# Patient Record
Sex: MALE | Race: OTHER | Employment: FULL TIME | URBAN - METROPOLITAN AREA
[De-identification: names, ages, dates, MRNs, and addresses within clinical notes are randomized per-mention and may not be internally consistent; named-entity substitution may affect disease eponyms.]

---

## 2019-07-27 ENCOUNTER — ANESTHESIA EVENT (OUTPATIENT)
Dept: ENDOSCOPY | Facility: HOSPITAL | Age: 59
End: 2019-07-27

## 2019-07-27 ENCOUNTER — ANESTHESIA (OUTPATIENT)
Dept: ENDOSCOPY | Facility: HOSPITAL | Age: 59
End: 2019-07-27

## 2019-07-27 ENCOUNTER — HOSPITAL ENCOUNTER (OUTPATIENT)
Facility: HOSPITAL | Age: 59
Setting detail: OBSERVATION
Discharge: HOME | End: 2019-07-29
Attending: EMERGENCY MEDICINE | Admitting: HOSPITALIST

## 2019-07-27 DIAGNOSIS — K92.2 GASTROINTESTINAL HEMORRHAGE, UNSPECIFIED GASTROINTESTINAL HEMORRHAGE TYPE: Primary | ICD-10-CM

## 2019-07-27 DIAGNOSIS — D64.9 ANEMIA, UNSPECIFIED TYPE: ICD-10-CM

## 2019-07-27 DIAGNOSIS — N28.9 RENAL INSUFFICIENCY: ICD-10-CM

## 2019-07-27 LAB
ALBUMIN SERPL-MCNC: 3.4 G/DL (ref 3.4–5)
ALBUMIN/GLOB SERPL: 1.2 {RATIO} (ref 1–2)
ALP LIVER SERPL-CCNC: 44 U/L (ref 45–117)
ALT SERPL-CCNC: 24 U/L (ref 16–61)
ANION GAP SERPL CALC-SCNC: 11 MMOL/L (ref 0–18)
ANTIBODY SCREEN: NEGATIVE
AST SERPL-CCNC: 12 U/L (ref 15–37)
BASOPHILS # BLD AUTO: 0.07 X10(3) UL (ref 0–0.2)
BASOPHILS NFR BLD AUTO: 0.6 %
BILIRUB SERPL-MCNC: 0.3 MG/DL (ref 0.1–2)
BUN BLD-MCNC: 112 MG/DL (ref 7–18)
BUN/CREAT SERPL: 48.9 (ref 10–20)
CALCIUM BLD-MCNC: 8.5 MG/DL (ref 8.5–10.1)
CHLORIDE SERPL-SCNC: 101 MMOL/L (ref 98–112)
CO2 SERPL-SCNC: 24 MMOL/L (ref 21–32)
CREAT BLD-MCNC: 2.29 MG/DL (ref 0.7–1.3)
DEPRECATED RDW RBC AUTO: 43.8 FL (ref 35.1–46.3)
EOSINOPHIL # BLD AUTO: 0.07 X10(3) UL (ref 0–0.7)
EOSINOPHIL NFR BLD AUTO: 0.6 %
ERYTHROCYTE [DISTWIDTH] IN BLOOD BY AUTOMATED COUNT: 13.5 % (ref 11–15)
GLOBULIN PLAS-MCNC: 2.9 G/DL (ref 2.8–4.4)
GLUCOSE BLD-MCNC: 113 MG/DL (ref 70–99)
GLUCOSE BLD-MCNC: 154 MG/DL (ref 70–99)
HCT VFR BLD AUTO: 27.8 % (ref 39–53)
HGB BLD-MCNC: 8 G/DL (ref 13–17.5)
HGB BLD-MCNC: 9.5 G/DL (ref 13–17.5)
IMM GRANULOCYTES # BLD AUTO: 0.14 X10(3) UL (ref 0–1)
IMM GRANULOCYTES NFR BLD: 1.1 %
LYMPHOCYTES # BLD AUTO: 2.38 X10(3) UL (ref 1–4)
LYMPHOCYTES NFR BLD AUTO: 18.7 %
M PROTEIN MFR SERPL ELPH: 6.3 G/DL (ref 6.4–8.2)
MCH RBC QN AUTO: 30.4 PG (ref 26–34)
MCHC RBC AUTO-ENTMCNC: 34.2 G/DL (ref 31–37)
MCV RBC AUTO: 89.1 FL (ref 80–100)
MONOCYTES # BLD AUTO: 0.8 X10(3) UL (ref 0.1–1)
MONOCYTES NFR BLD AUTO: 6.3 %
NEUTROPHILS # BLD AUTO: 9.24 X10 (3) UL (ref 1.5–7.7)
NEUTROPHILS # BLD AUTO: 9.24 X10(3) UL (ref 1.5–7.7)
NEUTROPHILS NFR BLD AUTO: 72.7 %
OSMOLALITY SERPL CALC.SUM OF ELEC: 321 MOSM/KG (ref 275–295)
PLATELET # BLD AUTO: 178 10(3)UL (ref 150–450)
POTASSIUM SERPL-SCNC: 3.3 MMOL/L (ref 3.5–5.1)
RBC # BLD AUTO: 3.12 X10(6)UL (ref 4.3–5.7)
RH BLOOD TYPE: POSITIVE
SODIUM SERPL-SCNC: 136 MMOL/L (ref 136–145)
WBC # BLD AUTO: 12.7 X10(3) UL (ref 4–11)

## 2019-07-27 PROCEDURE — 0DJ08ZZ INSPECTION OF UPPER INTESTINAL TRACT, VIA NATURAL OR ARTIFICIAL OPENING ENDOSCOPIC: ICD-10-PCS | Performed by: STUDENT IN AN ORGANIZED HEALTH CARE EDUCATION/TRAINING PROGRAM

## 2019-07-27 PROCEDURE — 30233N1 TRANSFUSION OF NONAUTOLOGOUS RED BLOOD CELLS INTO PERIPHERAL VEIN, PERCUTANEOUS APPROACH: ICD-10-PCS | Performed by: STUDENT IN AN ORGANIZED HEALTH CARE EDUCATION/TRAINING PROGRAM

## 2019-07-27 PROCEDURE — 99219 INITIAL OBSERVATION CARE,LEVL II: CPT | Performed by: STUDENT IN AN ORGANIZED HEALTH CARE EDUCATION/TRAINING PROGRAM

## 2019-07-27 RX ORDER — SUCRALFATE ORAL 1 G/10ML
1 SUSPENSION ORAL EVERY 6 HOURS
Status: DISCONTINUED | OUTPATIENT
Start: 2019-07-27 | End: 2019-07-29

## 2019-07-27 RX ORDER — LOSARTAN POTASSIUM AND HYDROCHLOROTHIAZIDE 25; 100 MG/1; MG/1
1 TABLET ORAL DAILY
COMMUNITY
End: 2019-12-10

## 2019-07-27 RX ORDER — SODIUM CHLORIDE, SODIUM LACTATE, POTASSIUM CHLORIDE, CALCIUM CHLORIDE 600; 310; 30; 20 MG/100ML; MG/100ML; MG/100ML; MG/100ML
INJECTION, SOLUTION INTRAVENOUS CONTINUOUS
Status: DISCONTINUED | OUTPATIENT
Start: 2019-07-27 | End: 2019-07-27

## 2019-07-27 RX ORDER — ATORVASTATIN CALCIUM 40 MG/1
40 TABLET, FILM COATED ORAL NIGHTLY
COMMUNITY
End: 2020-01-13

## 2019-07-27 RX ORDER — DEXTROSE MONOHYDRATE 25 G/50ML
50 INJECTION, SOLUTION INTRAVENOUS
Status: DISCONTINUED | OUTPATIENT
Start: 2019-07-27 | End: 2019-07-29

## 2019-07-27 RX ORDER — METOCLOPRAMIDE HYDROCHLORIDE 5 MG/ML
10 INJECTION INTRAMUSCULAR; INTRAVENOUS EVERY 8 HOURS PRN
Status: DISCONTINUED | OUTPATIENT
Start: 2019-07-27 | End: 2019-07-29

## 2019-07-27 RX ORDER — ONDANSETRON 2 MG/ML
4 INJECTION INTRAMUSCULAR; INTRAVENOUS EVERY 6 HOURS PRN
Status: DISCONTINUED | OUTPATIENT
Start: 2019-07-27 | End: 2019-07-29

## 2019-07-27 RX ORDER — SODIUM CHLORIDE 9 MG/ML
INJECTION, SOLUTION INTRAVENOUS ONCE
Status: COMPLETED | OUTPATIENT
Start: 2019-07-27 | End: 2019-07-27

## 2019-07-27 RX ORDER — HYDROMORPHONE HYDROCHLORIDE 1 MG/ML
0.4 INJECTION, SOLUTION INTRAMUSCULAR; INTRAVENOUS; SUBCUTANEOUS EVERY 5 MIN PRN
Status: DISCONTINUED | OUTPATIENT
Start: 2019-07-27 | End: 2019-07-27 | Stop reason: HOSPADM

## 2019-07-27 RX ORDER — AMLODIPINE BESYLATE 10 MG/1
10 TABLET ORAL DAILY
COMMUNITY
End: 2019-12-10 | Stop reason: ALTCHOICE

## 2019-07-27 RX ORDER — SODIUM CHLORIDE 9 MG/ML
INJECTION, SOLUTION INTRAVENOUS CONTINUOUS
Status: DISCONTINUED | OUTPATIENT
Start: 2019-07-27 | End: 2019-07-29

## 2019-07-27 RX ORDER — NALOXONE HYDROCHLORIDE 0.4 MG/ML
80 INJECTION, SOLUTION INTRAMUSCULAR; INTRAVENOUS; SUBCUTANEOUS AS NEEDED
Status: DISCONTINUED | OUTPATIENT
Start: 2019-07-27 | End: 2019-07-27 | Stop reason: HOSPADM

## 2019-07-27 RX ORDER — DEXTROSE MONOHYDRATE 25 G/50ML
50 INJECTION, SOLUTION INTRAVENOUS
Status: DISCONTINUED | OUTPATIENT
Start: 2019-07-27 | End: 2019-07-27 | Stop reason: HOSPADM

## 2019-07-27 RX ORDER — TERBINAFINE HYDROCHLORIDE 250 MG/1
250 TABLET ORAL DAILY
COMMUNITY
End: 2019-12-10 | Stop reason: ALTCHOICE

## 2019-07-27 NOTE — CONSULTS
[facility]  Report of GI Consultation    John Ortega Patient Status:  Emergency    7/15/1960 MRN [id number]   Location [address] [facility] Attending Morena Ace MD   [facility] Day # 0 PCP PHYSICIAN NONSTAFF     Date of Admission:  / rate 18, height 67\", weight 205 lb, SpO2 98 %. GENERAL: NAD, oriented x 3, pleasant  HENT: Normocephalic, no temporal wasting. Normal oral mucosa with good dentition, no ulcers. EYES: No scleral icterus, no conjunctival pallor.   PULM: Lungs clear to

## 2019-07-27 NOTE — ANESTHESIA PREPROCEDURE EVALUATION
PRE-OP EVALUATION    Patient Name: Antoine Amor    Pre-op Diagnosis: Melena    Procedure(s):  ESOPHAGOGASTRODUODENOSCOPY    Surgeon(s) and Role:     * Isela Pichardo MD - Primary    Pre-op vitals reviewed.   Pulse: 87  Resp: 18  BP: 100/64  SpO2: 98 % (L) 07/27/2019     07/27/2019    CO2 24.0 07/27/2019     (H) 07/27/2019    CREATSERUM 2.29 (H) 07/27/2019     (H) 07/27/2019    CA 8.5 07/27/2019            Airway      Mallampati: III  Mouth opening: 3 FB  TM distance: > 6 cm  Neck ROM

## 2019-07-27 NOTE — ED INITIAL ASSESSMENT (HPI)
A/o x3, pt reports rectal bleeding since yesterday morning, described large dark brown blood yesterday, today less amount but feels weakness and lethargic with difficulty in breathing. Hx of bleeding ulcers three years ago.

## 2019-07-27 NOTE — ANESTHESIA POSTPROCEDURE EVALUATION
[address] [facility] Patient Status:  Emergency   Age/Gender 61year old male MRN [id number]   Location [facility] Attending No att. providers found   Hosp Day # 0 PCP PHYSICIAN NONSTAFF       Anesthesia Post-op Note    Procedure(s)

## 2019-07-27 NOTE — OPERATIVE REPORT
EGD operative report  Patient Name: Thony Buckner  Procedure: Esophagogastroduodenoscopy  Indication: melena  Attending: Delia Ramos M.D. Consent:  The risks, benefits, and alternatives were discussed with the patient / POA.   Risks included, but were not the duodenum. Recommendations:   1. H pylori stool Ag  2. NPO with sips of clears, ice chips for now; will re-assess in PM and possibly clears later today  3. Transfuse 1 unit given hypotension and symptoms  4. PPI q12 hours  5. Sucralfate QID  6.  Repeat

## 2019-07-27 NOTE — PROGRESS NOTES
Daughter to bring home meds, pt does not recall the names of what he takes. Pt stated he does take something for sugars, he believes it is metformin. Dr. Toy Rowan made aware, per MD low dose column insulin started q6 accuchecks.

## 2019-07-27 NOTE — ED PROVIDER NOTES
Patient Seen in: [facility] Emergency Department    History   Patient presents with:  Blood In Stool    Stated Complaint:     HPI    70-year-old male presents emergency department who states has rectal bleeding since yesterday morning.   States start Supple no JVD no lymphadenopathy no meningismus no carotid bruit  CV: Regular rate and rhythm no murmur rub  Respiratory: Clear to auscultation bilaterally no crackles no wheezes no accessory muscle use  Abdomen: Soft nontender nondistended, no rebound no -----------         ------                     82 Nikki Ramírez (BLOOD BPSB)[[id number]]                               Final result               ANTIBODY HUQGDX[[id number]]                                  Final result                 Please view results for these t

## 2019-07-27 NOTE — PLAN OF CARE
Pt Aox4. R.A. 2L 02 w/sleep. Per pt at previous hospital stay they recommended a sleep study, tele applied per OSR protocol. Pt received 1 unit PRBC, tolerated well without reaction. K+rider ordered per electrolyte protocol following blood admin.   0.9% @

## 2019-07-27 NOTE — PROGRESS NOTES
NURSING ADMISSION NOTE      Patient admitted via cart from Endo Lab following procedure  Oriented to room. Safety precautions initiated. Bed in low position. Call light in reach. IVF infusing, pt denies pain.   Pt made aware of need for stool samp

## 2019-07-27 NOTE — H&P
[facility]  History and Physical     Darlin Kaba Patient Status:  Emergency    7/15/1960 MRN [id number]   Location [address] [facility] Attending Otto Mendosa MD   Hosp Day # 0 PCP PHYSICIAN NONSTAFF     Chief Complaint: Hina Sykes re grossly intact. Musculoskeletal: Moves all extremities. Extremities: No edema or cyanosis. Integument: No rashes or lesions. Psychiatric: Appropriate mood and affect.       Diagnostic Data:      Labs:  Recent Labs   Lab 07/27/19  0728   WBC 12.7*   HGB

## 2019-07-28 LAB
ANION GAP SERPL CALC-SCNC: 6 MMOL/L (ref 0–18)
BASOPHILS # BLD AUTO: 0.06 X10(3) UL (ref 0–0.2)
BASOPHILS NFR BLD AUTO: 0.9 %
BLOOD TYPE BARCODE: 5100
BUN BLD-MCNC: 32 MG/DL (ref 7–18)
BUN/CREAT SERPL: 38.6 (ref 10–20)
CALCIUM BLD-MCNC: 7.8 MG/DL (ref 8.5–10.1)
CHLORIDE SERPL-SCNC: 112 MMOL/L (ref 98–112)
CO2 SERPL-SCNC: 25 MMOL/L (ref 21–32)
CREAT BLD-MCNC: 0.83 MG/DL (ref 0.7–1.3)
DEPRECATED RDW RBC AUTO: 46.5 FL (ref 35.1–46.3)
EOSINOPHIL # BLD AUTO: 0.15 X10(3) UL (ref 0–0.7)
EOSINOPHIL NFR BLD AUTO: 2.2 %
ERYTHROCYTE [DISTWIDTH] IN BLOOD BY AUTOMATED COUNT: 14.1 % (ref 11–15)
GLUCOSE BLD-MCNC: 103 MG/DL (ref 70–99)
GLUCOSE BLD-MCNC: 109 MG/DL (ref 70–99)
GLUCOSE BLD-MCNC: 112 MG/DL (ref 70–99)
GLUCOSE BLD-MCNC: 115 MG/DL (ref 70–99)
GLUCOSE BLD-MCNC: 128 MG/DL (ref 70–99)
GLUCOSE BLD-MCNC: 148 MG/DL (ref 70–99)
HCT VFR BLD AUTO: 22 % (ref 39–53)
HGB BLD-MCNC: 7.3 G/DL (ref 13–17.5)
IMM GRANULOCYTES # BLD AUTO: 0.04 X10(3) UL (ref 0–1)
IMM GRANULOCYTES NFR BLD: 0.6 %
LYMPHOCYTES # BLD AUTO: 2.24 X10(3) UL (ref 1–4)
LYMPHOCYTES NFR BLD AUTO: 32.1 %
MCH RBC QN AUTO: 30 PG (ref 26–34)
MCHC RBC AUTO-ENTMCNC: 33.2 G/DL (ref 31–37)
MCV RBC AUTO: 90.5 FL (ref 80–100)
MONOCYTES # BLD AUTO: 0.46 X10(3) UL (ref 0.1–1)
MONOCYTES NFR BLD AUTO: 6.6 %
NEUTROPHILS # BLD AUTO: 4.02 X10 (3) UL (ref 1.5–7.7)
NEUTROPHILS # BLD AUTO: 4.02 X10(3) UL (ref 1.5–7.7)
NEUTROPHILS NFR BLD AUTO: 57.6 %
OSMOLALITY SERPL CALC.SUM OF ELEC: 303 MOSM/KG (ref 275–295)
PLATELET # BLD AUTO: 119 10(3)UL (ref 150–450)
POTASSIUM SERPL-SCNC: 3.8 MMOL/L (ref 3.5–5.1)
RBC # BLD AUTO: 2.43 X10(6)UL (ref 4.3–5.7)
SODIUM SERPL-SCNC: 143 MMOL/L (ref 136–145)
WBC # BLD AUTO: 7 X10(3) UL (ref 4–11)

## 2019-07-28 PROCEDURE — 99226 SUBSEQUENT OBSERVATION CARE: CPT | Performed by: STUDENT IN AN ORGANIZED HEALTH CARE EDUCATION/TRAINING PROGRAM

## 2019-07-28 NOTE — PROGRESS NOTES
Hgb continues to decrease. 7.3 this morning. Dr. Merri Opitz paged. D/c IVF, advance diet to full liquids. No transfusion at this time.

## 2019-07-28 NOTE — PLAN OF CARE
Pt Aox4. R.A.  0.9% IVF d/c'd, pt tolerating full liquid diet. Pt had one black formed stool this morning. Hgb 7.3, both MD's aware, IV iron given. K+ 3.8, per hospitalist does not need replaced. Pt denies pain to abdomen, voiding.   Pt up to chair and w

## 2019-07-28 NOTE — PROGRESS NOTES
[facility]  Progress Note     Casandra Clarity Patient Status:  Observation    7/15/1960 MRN [id number]   [facility] [place] 3SW-A Attending Humberto Iyer MD   Hosp Day # 0 PCP PHYSICIAN NONSTAFF     Chief Complaint: GIB    S: Patient feeling / PLAN:     1. Anemia due to GIB  2. HypoK  3. IRVING on CKD   4. HTN- hold meds, pt hypotensive  5.  DM type 2- ISS    Plan of care:   ADAT  Monitor Hb, tx for hb < 7  GI on Cs      Quality:  · DVT Prophylaxis: SCD  · CODE status: full  · [place]: no  · Central

## 2019-07-28 NOTE — PLAN OF CARE
Plan of care explained and updated with patient input. Progressing per plan of care. Patient remains alert and oriented to person, place, time and situation.  On room air with continuous pulse oximetry monitoring and telemetry monitoring, NSR. Shelley sin

## 2019-07-29 VITALS
WEIGHT: 205 LBS | BODY MASS INDEX: 32.18 KG/M2 | OXYGEN SATURATION: 98 % | TEMPERATURE: 99 F | HEIGHT: 67 IN | DIASTOLIC BLOOD PRESSURE: 79 MMHG | SYSTOLIC BLOOD PRESSURE: 136 MMHG | RESPIRATION RATE: 17 BRPM | HEART RATE: 68 BPM

## 2019-07-29 LAB
GLUCOSE BLD-MCNC: 109 MG/DL (ref 70–99)
GLUCOSE BLD-MCNC: 123 MG/DL (ref 70–99)
GLUCOSE BLD-MCNC: 127 MG/DL (ref 70–99)
HGB BLD-MCNC: 7.9 G/DL (ref 13–17.5)

## 2019-07-29 PROCEDURE — 99217 OBSERVATION CARE DISCHARGE: CPT | Performed by: STUDENT IN AN ORGANIZED HEALTH CARE EDUCATION/TRAINING PROGRAM

## 2019-07-29 RX ORDER — PANTOPRAZOLE SODIUM 40 MG/1
40 TABLET, DELAYED RELEASE ORAL
Qty: 120 TABLET | Refills: 0 | Status: SHIPPED | OUTPATIENT
Start: 2019-07-29 | End: 2019-12-10 | Stop reason: ALTCHOICE

## 2019-07-29 RX ORDER — SUCRALFATE ORAL 1 G/10ML
1 SUSPENSION ORAL EVERY 6 HOURS
Qty: 560 ML | Refills: 0 | Status: SHIPPED | OUTPATIENT
Start: 2019-07-29 | End: 2019-12-10 | Stop reason: ALTCHOICE

## 2019-07-29 NOTE — DISCHARGE SUMMARY
[facility]  DISCHARGE SUMMARY     Gwynneth Comp Patient Status:  Observation    7/15/1960 MRN [id number]   [facility] 3SW-A Attending No att. providers found   [facility] Day # 0 PCP PHYSICIAN NONSTAFF     Date of Admission: 2019  Date Refills:  0     atorvastatin 40 MG Tabs  Commonly known as:  LIPITOR      Take 40 mg by mouth nightly. Refills:  0     Losartan Potassium-HCTZ 100-25 MG Tabs  Commonly known as:  HYZAAR      Take 1 tablet by mouth daily.    Refills:  0     metFORMIN HCl 5 edema.  -----------------------------------------------------------------------------------------------  PATIENT DISCHARGE INSTRUCTIONS: See electronic chart    Alfa Sabillon MD    Time spent:  <30 minutes

## 2019-07-29 NOTE — PROGRESS NOTES
Gastroenterology Progress Note  Shaneka Patrick Patient Status:  Observation    7/15/1960 MRN [id number]   [facility] 3SW-A Attending Laurel Wilhelm MD   Hosp Day # 0 PCP PHYSICIAN NONSTAFF     Ch Pylori and hx of ulcers)  4. Will plan for an outpt follow-up with Dr Edgardo De Souza in 3-4 weeks; office contacted and will call pt.  Will need to determine need for possible repeat EGD at f/u office visit   RAMYA Murphy  11:03 AM  7/29/2019  Lora Six

## 2019-07-29 NOTE — PROGRESS NOTES
[address] [facility]  Report of GI Consultation    Evangelist Mace Patient Status:  Observation    7/15/1960 MRN [id number]   [facility] 3SW-A Attending Samanta Ghosh MD   Hosp Day # 0 PCP PHYSICIAN NONSTAFF     Date of Admission:  2019  PCP: 07/28/2019    .0 (L) 07/28/2019    CREATSERUM 0.83 07/28/2019    BUN 32 (H) 07/28/2019     07/28/2019    K 3.8 07/28/2019    CO2 25.0 07/28/2019    CA 7.8 (L) 07/28/2019    ALB 3.4 07/27/2019    ALKPHO 44 (L) 07/27/2019    TP 6.3 (L) 07/27/201

## 2019-07-29 NOTE — PLAN OF CARE
Plan of care explained and updated with patient input. Progressing per plan of care. Patient is alert and oriented to person, place, time and situation. On room air with continuous pulse oximetry monitoring and telemetry monitoring, with NSR.  Shelley sin

## 2019-07-29 NOTE — PLAN OF CARE
Problem: Patient/Family Goals  Goal: Patient/Family Long Term Goal  Description  Patient's Long Term Goal: \"take better care of myself, start eating healthy\"    Interventions:  - follow-up appts, participate in plan of care, follow recommended plan of

## 2019-07-30 LAB — H PYLORI AG STL QL IA: POSITIVE

## 2019-08-04 NOTE — PROGRESS NOTES
Cassidy Giles,  Please relay following message to patient. The antibiotics should be cheap, as they are all generic, so I think he should be able to get these even without having any insurance.   He can be seen once in follow-up no charge to review the long-term

## 2019-08-05 NOTE — PROGRESS NOTES
In addition to message I previously sent you, also notify patient that he has to hold his cholesterol medication (atorvastatin) while he takes his antibiotics.   Thanks,  PM

## 2019-12-11 PROBLEM — Z12.5 SCREENING FOR PROSTATE CANCER: Status: ACTIVE | Noted: 2019-12-11

## 2019-12-11 PROBLEM — Z87.19 HISTORY OF GI BLEED: Status: ACTIVE | Noted: 2019-12-11

## 2019-12-11 PROBLEM — E66.9 TYPE 2 DIABETES MELLITUS WITH OBESITY (HCC): Status: ACTIVE | Noted: 2019-12-11

## 2019-12-11 PROBLEM — E11.69 TYPE 2 DIABETES MELLITUS WITH OBESITY (HCC): Status: ACTIVE | Noted: 2019-12-11

## 2019-12-11 PROBLEM — E78.2 MIXED HYPERLIPIDEMIA: Status: ACTIVE | Noted: 2019-12-11

## 2019-12-11 PROBLEM — I10 ACCELERATED HYPERTENSION: Status: ACTIVE | Noted: 2019-12-11

## 2024-12-15 ENCOUNTER — HOSPITAL ENCOUNTER (OUTPATIENT)
Facility: HOSPITAL | Age: 64
Setting detail: OBSERVATION
Discharge: HOME | End: 2024-12-17
Attending: EMERGENCY MEDICINE | Admitting: INTERNAL MEDICINE

## 2024-12-15 DIAGNOSIS — K92.2 UPPER GI BLEED: Primary | ICD-10-CM

## 2024-12-15 DIAGNOSIS — D64.9 ANEMIA, UNSPECIFIED TYPE: ICD-10-CM

## 2024-12-15 LAB
ALBUMIN SERPL-MCNC: 4 G/DL (ref 3.2–4.8)
ALBUMIN/GLOB SERPL: 1.5 {RATIO} (ref 1–2)
ALP LIVER SERPL-CCNC: 64 U/L
ALT SERPL-CCNC: 29 U/L
ANION GAP SERPL CALC-SCNC: 5 MMOL/L (ref 0–18)
ANTIBODY SCREEN: NEGATIVE
APTT PPP: 35 SECONDS (ref 23–36)
AST SERPL-CCNC: 29 U/L (ref ?–34)
BASOPHILS # BLD AUTO: 0.09 X10(3) UL (ref 0–0.2)
BASOPHILS NFR BLD AUTO: 1.3 %
BILIRUB SERPL-MCNC: 0.4 MG/DL (ref 0.2–1.1)
BUN BLD-MCNC: 15 MG/DL (ref 9–23)
CALCIUM BLD-MCNC: 9.6 MG/DL (ref 8.7–10.4)
CHLORIDE SERPL-SCNC: 108 MMOL/L (ref 98–112)
CO2 SERPL-SCNC: 28 MMOL/L (ref 21–32)
CREAT BLD-MCNC: 0.79 MG/DL
DEPRECATED HBV CORE AB SER IA-ACNC: 19 NG/ML
EGFRCR SERPLBLD CKD-EPI 2021: 99 ML/MIN/1.73M2 (ref 60–?)
EOSINOPHIL # BLD AUTO: 0.28 X10(3) UL (ref 0–0.7)
EOSINOPHIL NFR BLD AUTO: 3.9 %
ERYTHROCYTE [DISTWIDTH] IN BLOOD BY AUTOMATED COUNT: 19.5 %
GLOBULIN PLAS-MCNC: 2.6 G/DL (ref 2–3.5)
GLUCOSE BLD-MCNC: 120 MG/DL (ref 70–99)
HCT VFR BLD AUTO: 38.4 %
HGB BLD-MCNC: 11.8 G/DL
IMM GRANULOCYTES # BLD AUTO: 0.03 X10(3) UL (ref 0–1)
IMM GRANULOCYTES NFR BLD: 0.4 %
INR BLD: 1.25 (ref 0.8–1.2)
IRON SATN MFR SERPL: 10 %
IRON SERPL-MCNC: 43 UG/DL
LYMPHOCYTES # BLD AUTO: 2.2 X10(3) UL (ref 1–4)
LYMPHOCYTES NFR BLD AUTO: 30.7 %
MCH RBC QN AUTO: 24 PG (ref 26–34)
MCHC RBC AUTO-ENTMCNC: 30.7 G/DL (ref 31–37)
MCV RBC AUTO: 78 FL
MONOCYTES # BLD AUTO: 0.77 X10(3) UL (ref 0.1–1)
MONOCYTES NFR BLD AUTO: 10.7 %
NEUTROPHILS # BLD AUTO: 3.8 X10 (3) UL (ref 1.5–7.7)
NEUTROPHILS # BLD AUTO: 3.8 X10(3) UL (ref 1.5–7.7)
NEUTROPHILS NFR BLD AUTO: 53 %
OSMOLALITY SERPL CALC.SUM OF ELEC: 294 MOSM/KG (ref 275–295)
PLATELET # BLD AUTO: 214 10(3)UL (ref 150–450)
PLATELETS.RETICULATED NFR BLD AUTO: 6.3 % (ref 0–7)
POTASSIUM SERPL-SCNC: 3.7 MMOL/L (ref 3.5–5.1)
PROT SERPL-MCNC: 6.6 G/DL (ref 5.7–8.2)
PROTHROMBIN TIME: 15.9 SECONDS (ref 11.6–14.8)
RBC # BLD AUTO: 4.92 X10(6)UL
RH BLOOD TYPE: POSITIVE
SODIUM SERPL-SCNC: 141 MMOL/L (ref 136–145)
TOTAL IRON BINDING CAPACITY: 411 UG/DL (ref 250–425)
TRANSFERRIN SERPL-MCNC: 324 MG/DL (ref 215–365)
WBC # BLD AUTO: 7.2 X10(3) UL (ref 4–11)

## 2024-12-15 PROCEDURE — 99223 1ST HOSP IP/OBS HIGH 75: CPT | Performed by: INTERNAL MEDICINE

## 2024-12-15 RX ORDER — SENNOSIDES 8.6 MG
17.2 TABLET ORAL NIGHTLY PRN
Status: DISCONTINUED | OUTPATIENT
Start: 2024-12-15 | End: 2024-12-17

## 2024-12-15 RX ORDER — ACETAMINOPHEN 500 MG
500 TABLET ORAL EVERY 4 HOURS PRN
Status: DISCONTINUED | OUTPATIENT
Start: 2024-12-15 | End: 2024-12-17

## 2024-12-15 RX ORDER — BISACODYL 10 MG
10 SUPPOSITORY, RECTAL RECTAL
Status: DISCONTINUED | OUTPATIENT
Start: 2024-12-15 | End: 2024-12-17

## 2024-12-15 RX ORDER — ONDANSETRON 2 MG/ML
4 INJECTION INTRAMUSCULAR; INTRAVENOUS EVERY 6 HOURS PRN
Status: DISCONTINUED | OUTPATIENT
Start: 2024-12-15 | End: 2024-12-17

## 2024-12-15 RX ORDER — POLYETHYLENE GLYCOL 3350 17 G/17G
17 POWDER, FOR SOLUTION ORAL DAILY PRN
Status: DISCONTINUED | OUTPATIENT
Start: 2024-12-15 | End: 2024-12-17

## 2024-12-15 RX ORDER — BENZONATATE 200 MG/1
200 CAPSULE ORAL 3 TIMES DAILY PRN
Status: DISCONTINUED | OUTPATIENT
Start: 2024-12-15 | End: 2024-12-17

## 2024-12-15 RX ORDER — ECHINACEA PURPUREA EXTRACT 125 MG
1 TABLET ORAL
Status: DISCONTINUED | OUTPATIENT
Start: 2024-12-15 | End: 2024-12-17

## 2024-12-15 RX ORDER — SODIUM CHLORIDE 9 MG/ML
INJECTION, SOLUTION INTRAVENOUS CONTINUOUS
Status: ACTIVE | OUTPATIENT
Start: 2024-12-15 | End: 2024-12-16

## 2024-12-15 RX ORDER — DICYCLOMINE HYDROCHLORIDE 10 MG/1
10 CAPSULE ORAL 3 TIMES DAILY PRN
Status: DISCONTINUED | OUTPATIENT
Start: 2024-12-15 | End: 2024-12-17

## 2024-12-15 RX ORDER — MORPHINE SULFATE 2 MG/ML
1 INJECTION, SOLUTION INTRAMUSCULAR; INTRAVENOUS EVERY 4 HOURS PRN
Status: DISCONTINUED | OUTPATIENT
Start: 2024-12-15 | End: 2024-12-17

## 2024-12-15 RX ORDER — ATORVASTATIN CALCIUM 40 MG/1
40 TABLET, FILM COATED ORAL NIGHTLY
Status: DISCONTINUED | OUTPATIENT
Start: 2024-12-15 | End: 2024-12-17

## 2024-12-15 RX ORDER — PROCHLORPERAZINE EDISYLATE 5 MG/ML
5 INJECTION INTRAMUSCULAR; INTRAVENOUS EVERY 8 HOURS PRN
Status: DISCONTINUED | OUTPATIENT
Start: 2024-12-15 | End: 2024-12-17

## 2024-12-15 RX ORDER — SODIUM CHLORIDE 9 MG/ML
INJECTION, SOLUTION INTRAVENOUS CONTINUOUS
Status: DISCONTINUED | OUTPATIENT
Start: 2024-12-15 | End: 2024-12-15

## 2024-12-15 RX ORDER — LOSARTAN POTASSIUM 100 MG/1
100 TABLET ORAL DAILY
Status: DISCONTINUED | OUTPATIENT
Start: 2024-12-16 | End: 2024-12-17

## 2024-12-15 RX ORDER — ROSUVASTATIN CALCIUM 40 MG/1
40 TABLET, COATED ORAL NIGHTLY
COMMUNITY
End: 2024-12-17

## 2024-12-15 RX ORDER — SODIUM PHOSPHATE, DIBASIC AND SODIUM PHOSPHATE, MONOBASIC 7; 19 G/230ML; G/230ML
1 ENEMA RECTAL ONCE AS NEEDED
Status: DISCONTINUED | OUTPATIENT
Start: 2024-12-15 | End: 2024-12-17

## 2024-12-15 RX ORDER — PNV NO.95/FERROUS FUM/FOLIC AC 28MG-0.8MG
TABLET ORAL
COMMUNITY

## 2024-12-15 NOTE — ED QUICK NOTES
Rounding Completed     Plan of Care reviewed.  Elimination needs assessed.  Comfort measures met.     Bed is locked and in lowest position. Call light within reach.    Rounding note  Hospitalist at bedside with patient.

## 2024-12-15 NOTE — ED QUICK NOTES
Orders for admission, patient is aware of plan and ready to go upstairs. Any questions, please call ED RN eric at extension [phone].     Patient Covid vaccination status: Fully vaccinated     COVID Test Ordered in ED: None    COVID Suspicion at Admission: N/A    Running Infusions:      Mental Status/LOC at time of transport: axo4    Other pertinent information: Pt on RA, ambulatory  CIWA score: N/A   NIH score:  N/A

## 2024-12-15 NOTE — ED PROVIDER NOTES
Patient Seen in: [facility] Emergency Department      History     Chief Complaint   Patient presents with    GI Bleeding    Abdomen/Flank Pain     Stated Complaint: GI bleed    Subjective:   HPI      Patient is a 64-year-old male who reports he has a prior history of upper GI bleed from bleeding ulcers presenting with 3 dark stools in the last 3 days.  He has a little bit of epigastric pain as well.  No nausea or vomiting.  Has not felt lightheaded, fatigued or weak.  He states in February of this year he was hospitalized at [facility] which is when they found the ulcer.  He states his hemoglobin was quite low at that time and he needed 3 or 4 units of blood.    Objective:     Past Medical History:    Anemia    Diabetes (HCC)    Essential hypertension    GI bleed    History of blood transfusion    Hyperlipidemia              History reviewed. No pertinent surgical history.             Social History     Socioeconomic History    Marital status: Single   Tobacco Use    Smoking status: Former     Current packs/day: 0.50     Average packs/day: 0.5 packs/day for 30.0 years (15.0 ttl pk-yrs)     Types: Cigarettes    Smokeless tobacco: Never    Tobacco comments:     6-7 cigarettes a day   Vaping Use    Vaping status: Every Day   Substance and Sexual Activity    Alcohol use: Not Currently     Social Drivers of Health     Financial Resource Strain: Not on File (7/20/2023)    Received from "[employer]    Financial Resource Strain     Financial Resource Strain: 0   Food Insecurity: Not on File (8/17/2023)    Received from "[employer]    Food Insecurity     Food: 0   Transportation Needs: Not on File (8/17/2023)    Received from "[employer]    Transportation Needs     Transportation: 0   Physical Activity: Not on File (7/20/2023)    Received from "[employer]    Physical Activity     Physical Activity: 0   Stress: Not on File (7/20/2023)    Received from "[employer]    Stress     Stress: 0   Social Connections: Not on File (9/10/2024)     Received from [employer]    Social Connections     Connectedness: 0   Housing Stability: Not on File (2023)    Received from [employer]    Housing Stability     Housin                  Physical Exam     ED Triage Vitals [12/15/24 1237]   /69   Pulse 83   Resp 18   Temp 98 °F (36.7 °C)   Temp src Oral   SpO2 96 %   O2 Device None (Room air)       Current Vitals:   Vital Signs  BP: 128/66  Pulse: 72  Resp: 18  Temp: 98 °F (36.7 °C)  Temp src: Oral  MAP (mmHg): 84    Oxygen Therapy  SpO2: 95 %  O2 Device: None (Room air)        Physical Exam  Vitals and nursing note reviewed.   Constitutional:       Appearance: He is well-developed.   HENT:      Head: Normocephalic and atraumatic.   Eyes:      Conjunctiva/sclera: Conjunctivae normal.      Pupils: Pupils are equal, round, and reactive to light.   Cardiovascular:      Rate and Rhythm: Normal rate and regular rhythm.      Heart sounds: Normal heart sounds.   Pulmonary:      Effort: Pulmonary effort is normal.      Breath sounds: Normal breath sounds.   Abdominal:      General: Bowel sounds are normal.      Palpations: Abdomen is soft.      Comments: Minimal epigastric tenderness.  Nondistended.  No rebound or guarding.   Genitourinary:     Comments: Patient declined rectal exam for stool guaiac.  Musculoskeletal:         General: Normal range of motion.      Cervical back: Normal range of motion and neck supple.   Skin:     General: Skin is warm and dry.   Neurological:      Mental Status: He is alert and oriented to person, place, and time.             ED Course     Labs Reviewed   COMP METABOLIC PANEL (14) - Abnormal; Notable for the following components:       Result Value    Glucose 120 (*)     All other components within normal limits   CBC WITH DIFFERENTIAL WITH PLATELET - Abnormal; Notable for the following components:    HGB 11.8 (*)     HCT 38.4 (*)     MCV 78.0 (*)     MCH 24.0 (*)     MCHC 30.7 (*)     All other components within normal limits    PROTHROMBIN TIME (PT) - Abnormal; Notable for the following components:    PT 15.9 (*)     INR 1.25 (*)     All other components within normal limits   PTT, ACTIVATED - Normal   TYPE AND SCREEN    Narrative:     The following orders were created for panel order Type and screen.  Procedure                               Abnormality         Status                     ---------                               -----------         ------                     ABORH (Blood Type)[[id number]]                               Final result               Antibody Screen[[id number]]                                  Final result                 Please view results for these tests on the individual orders.   ABORH (BLOOD TYPE)   ANTIBODY SCREEN   RAINBOW DRAW GOLD                   [facility]      64-year-old male who reports prior history of upper GI bleed from an ulcer earlier this year requiring transfusion of multiple units of packed red cells presenting with recurrent symptoms of 3 black stools in the last 3 days.  He is a little bit of epigastric pain.  He is not uncomfortable here.  He has not felt fatigued or lightheaded at all but he states this is exactly how his symptoms started in February and he wanted to come earlier so he did not get as sick.  He also reports he was hospitalized in September with similar dark stool but states it was from polyps in his colon.  Labs ordered to evaluate for anemia, thrombocytopenia, coagulopathy.    Admission disposition: 12/15/2024  3:26 PM       Update at 3:25 PM.  Hemoglobin of 11.8 is a little bit low.  No real recent ones for comparison.  This may be acute although BUN is normal which would argue against that.  I think with his history of upper GI bleed from an ulcer and the mild anemia he does warrant admission for serial hemoglobins and GI evaluation.        Past Medical History-hypertension, diabetes    Differential diagnosis before testing included ulcer, gastritis    Co-morbidities that  add to the complexity of management include: None    Testing ordered during this visit included labs      Discussion of management with hospitalist, GI      Medications Provided: Protonix          Disposition:    Admission  I have discussed with the patient the results of test, differential diagnosis, and treatment plan. They expressed clear understanding of these instructions and agrees to the plan provided.         Medical Decision Making      Disposition and Plan     Clinical Impression:  1. Upper GI bleed    2. Anemia, unspecified type         Disposition:  Admit  12/15/2024  3:26 pm    Follow-up:  No follow-up provider specified.        Medications Prescribed:  Current Discharge Medication List              Supplementary Documentation:         Hospital Problems       Present on Admission  Date Reviewed: 9/8/2020            ICD-10-CM Noted POA    * (Principal) Upper GI bleed K92.2 12/15/2024 Unknown

## 2024-12-15 NOTE — H&P
[facility]     Moises Olsen Patient Status:  Emergency    7/15/1960 MRN [id number]   Location [facility] EMERGENCY DEPARTMENT Attending Brown Wilkes MD   Hosp Day # 0 PCP Jackson Landrum MD     Chief Complaint: Dark stool    Subjective:    History of Present Illness:   Moises Olsen is a 64 year old male with PMHx peptic ulcer disease with prior GI bleed requiring blood transfusion, prediabetes, hypertension and hyperlipidemia who presents to the hospital for evaluation of dark stool.  Symptoms been ongoing since Friday morning and he has had 3 episodes of dark stool along with epigastric discomfort but not actual pain.  He denies any fever, chills, nausea, vomiting, diarrhea, chest pain, shortness of breath or dizziness.  Patient was hospitalized in 2024 at [facility] when they found the ulcer and he required blood transfusion at that time.  He was again admitted with dark stool in September.  In the ER, he is afebrile and hemodynamically stable CMP unremarkable and INR is 1.25.  Hemoglobin is 11.8 with MCV 78.    History/Other:    Past Medical History:  Past Medical History:    Anemia    Diabetes (HCC)    Essential hypertension    GI bleed    History of blood transfusion    Hyperlipidemia     Past Surgical History:   History reviewed. No pertinent surgical history.   Family History:   No family history on file.  Social History:    reports that he has quit smoking. His smoking use included cigarettes. He has a 15 pack-year smoking history. He has never used smokeless tobacco. He reports that he does not currently use alcohol.     Allergies: Allergies[1]    Medications:  Medications Ordered Prior to Encounter[2]    Review of Systems:   A comprehensive review of systems was completed.    Pertinent positives and negatives noted in the HPI.    Objective:   Physical Exam:    /66   Pulse 72   Temp 98 °F (36.7 °C) (Oral)   Resp 18   Ht 5' 7\" (1.702 m)    Wt 210 lb (95.3 kg)   SpO2 95%   BMI 32.89 kg/m²   General: No acute distress, awake and alert  Respiratory: No rhonchi, no wheezes  Cardiovascular: S1, S2. Regular rate and rhythm  Abdomen: Soft, Non-tender, non-distended, positive bowel sounds  Neuro: MONIQUE x 4  Extremities: No edema    Results:    Labs:      Labs Last 24 Hours:  Recent Labs   Lab 12/15/24  1344   RBC 4.92   HGB 11.8*   HCT 38.4*   MCV 78.0*   MCH 24.0*   MCHC 30.7*   RDW 19.5   NEPRELIM 3.80   WBC 7.2   .0     Recent Labs   Lab 12/15/24  1344   *   BUN 15   CREATSERUM 0.79   EGFRCR 99   CA 9.6   ALB 4.0      K 3.7      CO2 28.0   ALKPHO 64   AST 29   ALT 29   BILT 0.4   TP 6.6     Lab Results   Component Value Date    INR 1.25 (H) 12/15/2024     No results for input(s): \"TROP\", \"TROPHS\", \"CK\" in the last 168 hours.    No results for input(s): \"TROP\", \"PBNP\" in the last 168 hours.    No results for input(s): \"PCT\" in the last 168 hours.    Imaging: Imaging data reviewed in Epic.    Assessment & Plan:      #Dark stool suspect due to upper GI bleed  #Hx of GI bleed due to ulcer earlier this year  -GI consult  -IV PPI BID  -CLD, NPO at midnight. Start IVF at midnight  -Monitor hgb, no baseline from this year    #Microcytosis  -Check iron studies    #Prediabetes  -Most recent A1c is 6.0    #Hypertension  -Resume home losartan but hold hydrochlorothiazide     #Hyperlipidemia  -Statin      Plan of care discussed with patient and daughter.    Fly Bingham DO    Supplementary Documentation:     The 21st Century Cures Act makes medical notes like these available to patients in the interest of transparency. Please be advised this is a medical document. Medical documents are intended to carry relevant information, facts as evident, and the clinical opinion of the practitioner. The medical note is intended as peer to peer communication and may appear blunt or direct. It is written in medical language and may contain abbreviations or  verbiage that are unfamiliar.                   [1] No Known Allergies  [2]   No current facility-administered medications on file prior to encounter.     Current Outpatient Medications on File Prior to Encounter   Medication Sig Dispense Refill    rosuvastatin 40 MG Oral Tab Take 1 tablet (40 mg total) by mouth nightly.      Ferrous Sulfate (IRON) 325 (65 Fe) MG Oral Tab Take by mouth.      Losartan Potassium-HCTZ 100-25 MG Oral Tab Take 1 tablet by mouth daily. 90 tablet 1    predniSONE 10 MG Oral Tab 4 tabs daily with food 2 days then 3 tabs for 2 days, then 2 tabs for 2 days then 1 tab for 2 days. 20 tablet 0    atorvastatin 40 MG Oral Tab Take 1 tablet (40 mg total) by mouth nightly. 90 tablet 1    ergocalciferol 1.25 MG (16031 UT) Oral Cap Take 1 capsule (50,000 Units total) by mouth every 7 days. (Patient not taking: Reported on 1/13/2020 ) 8 capsule 0

## 2024-12-16 ENCOUNTER — ANESTHESIA (OUTPATIENT)
Dept: ENDOSCOPY | Facility: HOSPITAL | Age: 64
End: 2024-12-16

## 2024-12-16 ENCOUNTER — ANESTHESIA EVENT (OUTPATIENT)
Dept: ENDOSCOPY | Facility: HOSPITAL | Age: 64
End: 2024-12-16

## 2024-12-16 LAB
BASOPHILS # BLD AUTO: 0.06 X10(3) UL (ref 0–0.2)
BASOPHILS NFR BLD AUTO: 1 %
EOSINOPHIL # BLD AUTO: 0.22 X10(3) UL (ref 0–0.7)
EOSINOPHIL NFR BLD AUTO: 3.6 %
ERYTHROCYTE [DISTWIDTH] IN BLOOD BY AUTOMATED COUNT: 19.3 %
HCT VFR BLD AUTO: 35.2 %
HGB BLD-MCNC: 10.6 G/DL
IMM GRANULOCYTES # BLD AUTO: 0.04 X10(3) UL (ref 0–1)
IMM GRANULOCYTES NFR BLD: 0.7 %
LYMPHOCYTES # BLD AUTO: 1.78 X10(3) UL (ref 1–4)
LYMPHOCYTES NFR BLD AUTO: 29.3 %
MCH RBC QN AUTO: 24.3 PG (ref 26–34)
MCHC RBC AUTO-ENTMCNC: 30.1 G/DL (ref 31–37)
MCV RBC AUTO: 80.7 FL
MONOCYTES # BLD AUTO: 0.72 X10(3) UL (ref 0.1–1)
MONOCYTES NFR BLD AUTO: 11.9 %
NEUTROPHILS # BLD AUTO: 3.25 X10 (3) UL (ref 1.5–7.7)
NEUTROPHILS # BLD AUTO: 3.25 X10(3) UL (ref 1.5–7.7)
NEUTROPHILS NFR BLD AUTO: 53.5 %
PLATELET # BLD AUTO: 174 10(3)UL (ref 150–450)
PLATELETS.RETICULATED NFR BLD AUTO: 7.1 % (ref 0–7)
RBC # BLD AUTO: 4.36 X10(6)UL
WBC # BLD AUTO: 6.1 X10(3) UL (ref 4–11)

## 2024-12-16 PROCEDURE — 99232 SBSQ HOSP IP/OBS MODERATE 35: CPT | Performed by: INTERNAL MEDICINE

## 2024-12-16 PROCEDURE — 0DB78ZX EXCISION OF STOMACH, PYLORUS, VIA NATURAL OR ARTIFICIAL OPENING ENDOSCOPIC, DIAGNOSTIC: ICD-10-PCS | Performed by: STUDENT IN AN ORGANIZED HEALTH CARE EDUCATION/TRAINING PROGRAM

## 2024-12-16 PROCEDURE — 0DB68ZX EXCISION OF STOMACH, VIA NATURAL OR ARTIFICIAL OPENING ENDOSCOPIC, DIAGNOSTIC: ICD-10-PCS | Performed by: STUDENT IN AN ORGANIZED HEALTH CARE EDUCATION/TRAINING PROGRAM

## 2024-12-16 RX ORDER — SUCRALFATE ORAL 1 G/10ML
1 SUSPENSION ORAL
Status: DISCONTINUED | OUTPATIENT
Start: 2024-12-16 | End: 2024-12-17

## 2024-12-16 RX ORDER — SODIUM CHLORIDE, SODIUM LACTATE, POTASSIUM CHLORIDE, CALCIUM CHLORIDE 600; 310; 30; 20 MG/100ML; MG/100ML; MG/100ML; MG/100ML
INJECTION, SOLUTION INTRAVENOUS CONTINUOUS PRN
Status: DISCONTINUED | OUTPATIENT
Start: 2024-12-16 | End: 2024-12-16 | Stop reason: SURG

## 2024-12-16 RX ORDER — LIDOCAINE HYDROCHLORIDE 10 MG/ML
INJECTION, SOLUTION EPIDURAL; INFILTRATION; INTRACAUDAL; PERINEURAL AS NEEDED
Status: DISCONTINUED | OUTPATIENT
Start: 2024-12-16 | End: 2024-12-16 | Stop reason: SURG

## 2024-12-16 RX ADMIN — SODIUM CHLORIDE, SODIUM LACTATE, POTASSIUM CHLORIDE, CALCIUM CHLORIDE: 600; 310; 30; 20 INJECTION, SOLUTION INTRAVENOUS at 10:39:00

## 2024-12-16 RX ADMIN — SODIUM CHLORIDE, SODIUM LACTATE, POTASSIUM CHLORIDE, CALCIUM CHLORIDE: 600; 310; 30; 20 INJECTION, SOLUTION INTRAVENOUS at 10:32:00

## 2024-12-16 RX ADMIN — LIDOCAINE HYDROCHLORIDE 50 MG: 10 INJECTION, SOLUTION EPIDURAL; INFILTRATION; INTRACAUDAL; PERINEURAL at 10:33:00

## 2024-12-16 NOTE — ANESTHESIA PREPROCEDURE EVALUATION
PRE-OP EVALUATION    Patient Name: Moises Olsen    Admit Diagnosis: Upper GI bleed [K92.2]  Anemia, unspecified type [D64.9]    Pre-op Diagnosis: melena / anemia    ESOPHAGOGASTRODUODENOSCOPY (EGD)    Anesthesia Procedure: ESOPHAGOGASTRODUODENOSCOPY (EGD)    Surgeons and Role:     * Laron Pichardo MD - Primary    Pre-op vitals reviewed.  Temp: 98.4 °F (36.9 °C)  Pulse: 72  Resp: 22  BP: 133/72  SpO2: 90 %  Body mass index is 32.89 kg/m².    Current medications reviewed.  Hospital Medications:   [COMPLETED] sodium chloride 0.9 % IV bolus 1,000 mL  1,000 mL Intravenous Once    [COMPLETED] pantoprazole (Protonix) 40 mg in sodium chloride 0.9% PF 10 mL IV push  40 mg Intravenous Once    pantoprazole (Protonix) 40 mg in sodium chloride 0.9% PF 10 mL IV push  40 mg Intravenous Q12H    sodium chloride 0.9% infusion   Intravenous Continuous    atorvastatin (Lipitor) tab 40 mg  40 mg Oral Nightly    losartan (Cozaar) tab 100 mg  100 mg Oral Daily    acetaminophen (Tylenol Extra Strength) tab 500 mg  500 mg Oral Q4H PRN    morphINE PF 2 MG/ML injection 1 mg  1 mg Intravenous Q4H PRN    dicyclomine (Bentyl) cap 10 mg  10 mg Oral TID PRN    melatonin tab 3 mg  3 mg Oral Nightly PRN    polyethylene glycol (PEG 3350) (Miralax) 17 g oral packet 17 g  17 g Oral Daily PRN    sennosides (Senokot) tab 17.2 mg  17.2 mg Oral Nightly PRN    bisacodyl (Dulcolax) 10 MG rectal suppository 10 mg  10 mg Rectal Daily PRN    fleet enema (Fleet) rectal enema 133 mL  1 enema Rectal Once PRN    ondansetron (Zofran) 4 MG/2ML injection 4 mg  4 mg Intravenous Q6H PRN    prochlorperazine (Compazine) 10 MG/2ML injection 5 mg  5 mg Intravenous Q8H PRN    benzonatate (Tessalon) cap 200 mg  200 mg Oral TID PRN    guaiFENesin (Robitussin) 100 MG/5 ML oral liquid 200 mg  200 mg Oral Q4H PRN    glycerin-hypromellose- (Artificial Tears) 0.2-0.2-1 % ophthalmic solution 1 drop  1 drop Both Eyes QID PRN    sodium chloride (Saline Mist) 0.65 % nasal  solution 1 spray  1 spray Each Nare Q3H PRN    sodium ferric gluconate (Ferrlecit) 125 mg in sodium chloride 0.9% 100mL IVPB premix  125 mg Intravenous Daily       Outpatient Medications:   Prescriptions Prior to Admission[1]    Allergies: Patient has no known allergies.      Anesthesia Evaluation    Patient summary reviewed.    Anesthetic Complications  (-) history of anesthetic complications         GI/Hepatic/Renal                                 Cardiovascular        Exercise tolerance: good     MET: >4      (+) hypertension                       (-) angina              Endo/Other      (+) diabetes  type 2, not using insulin                         Pulmonary               (-) shortness of breath            Neuro/Psych                                      History reviewed. No pertinent surgical history.  Social History     Socioeconomic History    Marital status: Single   Tobacco Use    Smoking status: Former     Current packs/day: 0.50     Average packs/day: 0.5 packs/day for 30.0 years (15.0 ttl pk-yrs)     Types: Cigarettes    Smokeless tobacco: Never    Tobacco comments:     6-7 cigarettes a day   Vaping Use    Vaping status: Every Day   Substance and Sexual Activity    Alcohol use: Not Currently     History   Drug Use Not on file     Available pre-op labs reviewed.  Lab Results   Component Value Date    WBC 6.1 12/16/2024    RBC 4.36 12/16/2024    HGB 10.6 (L) 12/16/2024    HCT 35.2 (L) 12/16/2024    MCV 80.7 12/16/2024    MCH 24.3 (L) 12/16/2024    MCHC 30.1 (L) 12/16/2024    RDW 19.3 12/16/2024    .0 12/16/2024     Lab Results   Component Value Date     12/15/2024    K 3.7 12/15/2024     12/15/2024    CO2 28.0 12/15/2024    BUN 15 12/15/2024    CREATSERUM 0.79 12/15/2024     (H) 12/15/2024    CA 9.6 12/15/2024     Lab Results   Component Value Date    INR 1.25 (H) 12/15/2024         Airway      Mallampati: II  Mouth opening: 3 FB  TM distance: 4 - 6 cm  Neck ROM: full  Cardiovascular      Rhythm: regular  Rate: normal     Dental             Pulmonary      Breath sounds clear to auscultation bilaterally.               Other findings              ASA: 2   Plan: MAC  NPO status verified and patient meets guidelines.    Post-procedure pain management plan discussed with surgeon and patient.    Comment: Plan is MAC anesthesia, which may include deep sedation.  Implied that memory of procedure is unlikely although intraop recall, if it occurs, may be a reasonable and comfortable experience with this anesthetic.  Aware that general anesthesia is not intended though deep sedation may include occasional moments of general anesthesia.  The backup plan for safe patient care may include change of plan to full general anesthesia with potential airway placement.  Patient (legal guardian) demonstrated understanding, accepts risks and benefits, and wishes to proceed as reflected in the signed consent form.  Difficulty airway equipment available as needed, may need general anesthesia OETT.  Previous anesthesia records reviewed.      Plan/risks discussed with: patient                Present on Admission:  **None**             [1]   Medications Prior to Admission   Medication Sig Dispense Refill Last Dose/Taking    Ferrous Sulfate (IRON) 325 (65 Fe) MG Oral Tab Take by mouth.   Past Week    Losartan Potassium-HCTZ 100-25 MG Oral Tab Take 1 tablet by mouth daily. 90 tablet 1 12/15/2024 at  7:00 AM    atorvastatin 40 MG Oral Tab Take 1 tablet (40 mg total) by mouth nightly. 90 tablet 1 12/14/2024 Evening    rosuvastatin 40 MG Oral Tab Take 1 tablet (40 mg total) by mouth nightly. (Patient not taking: Reported on 12/15/2024)   Unknown    predniSONE 10 MG Oral Tab 4 tabs daily with food 2 days then 3 tabs for 2 days, then 2 tabs for 2 days then 1 tab for 2 days. 20 tablet 0

## 2024-12-16 NOTE — PLAN OF CARE
Assumed pt care at 0730. A&O x 4. On room air. Denies pain. Vital signs stable, NSR on tele. Up with SBA.     Plan of care: EGD, IVF.    Plan of care updated with patient. Questions answered, pt verbalized understanding. Call light is within reach. All needs met at this time.    Problem: CARDIOVASCULAR - ADULT  Goal: Maintains optimal cardiac output and hemodynamic stability  Description: INTERVENTIONS:  - Monitor vital signs, rhythm, and trends  - Monitor for bleeding, hypotension and signs of decreased cardiac output  - Evaluate effectiveness of vasoactive medications to optimize hemodynamic stability  - Monitor arterial and/or venous puncture sites for bleeding and/or hematoma  - Assess quality of pulses, skin color and temperature  - Assess for signs of decreased coronary artery perfusion - ex. Angina  - Evaluate fluid balance, assess for edema, trend weights  Outcome: Progressing  Goal: Absence of cardiac arrhythmias or at baseline  Description: INTERVENTIONS:  - Continuous cardiac monitoring, monitor vital signs, obtain 12 lead EKG if indicated  - Evaluate effectiveness of antiarrhythmic and heart rate control medications as ordered  - Initiate emergency measures for life threatening arrhythmias  - Monitor electrolytes and administer replacement therapy as ordered  Outcome: Progressing     Problem: Patient/Family Goals  Goal: Patient/Family Long Term Goal  Description: Patient's Long Term Goal: prevent readmission    Interventions:  - medications as prescribed  - follow up as recommended  - See additional Care Plan goals for specific interventions  Outcome: Progressing  Goal: Patient/Family Short Term Goal  Description: Patient's Short Term Goal: discharge home    Interventions:   - tests and procedures as ordered  -monitor hgb  -watch for dark stools  -medications as prescribed  - See additional Care Plan goals for specific interventions  Outcome: Progressing     Problem: GASTROINTESTINAL - ADULT  Goal:  Minimal or absence of nausea and vomiting  Description: INTERVENTIONS:  - Maintain adequate hydration with IV or PO as ordered and tolerated  - Nasogastric tube to low intermittent suction as ordered  - Evaluate effectiveness of ordered antiemetic medications  - Provide nonpharmacologic comfort measures as appropriate  - Advance diet as tolerated, if ordered  - Obtain nutritional consult as needed  - Evaluate fluid balance  Outcome: Progressing  Goal: Maintains or returns to baseline bowel function  Description: INTERVENTIONS:  - Assess bowel function  - Maintain adequate hydration with IV or PO as ordered and tolerated  - Evaluate effectiveness of GI medications  - Encourage mobilization and activity  - Obtain nutritional consult as needed  - Establish a toileting routine/schedule  - Consider collaborating with pharmacy to review patient's medication profile  Outcome: Progressing  Goal: Maintains adequate nutritional intake (undernourished)  Description: INTERVENTIONS:  - Monitor percentage of each meal consumed  - Identify factors contributing to decreased intake, treat as appropriate  - Assist with meals as needed  - Monitor I&O, WT and lab values  - Obtain nutritional consult as needed  - Optimize oral hygiene and moisture  - Encourage food from home; allow for food preferences  - Enhance eating environment  Outcome: Progressing  Goal: Achieves appropriate nutritional intake (bariatric)  Description: INTERVENTIONS:  - Monitor for over-consumption  - Identify factors contributing to increased intake, treat as appropriate  - Monitor I&O, WT and lab values  - Obtain nutritional consult as needed  - Evaluate psychosocial factors contributing to over-consumption  Outcome: Progressing

## 2024-12-16 NOTE — OPERATIVE REPORT
EGD operative report  Patient Name: Moises Olsen  Date: 12/16/2024  Procedure: Esophagogastroduodenoscopy with biopsy   Pre-Op Diagnosis: history of PUD, melena, anemia  Post-Op Diagnosis: duodenal ulcer  Attending: Laron Pichardo M.D.  Consent:  The risks, benefits, and alternatives were discussed with the patient / POA.  Risks included, but were not limited to, bleeding, perforation, medication effects, cardiac arrhythmias, and aspiration.  After all questions were answered to their satisfaction, a signed, informed, and witnessed consent was obtained.  Sedation: Monitored Anesthesia Care  Monitoring:  Pulsoximetry, pulse, respirations, and blood pressure were monitored throughout the entire procedure  Procedure: After achieving adequate sedation and placing the patient in the left lateral decubitus position, the lubricated upper endoscope was introduced into the mouth and advanced to the descending duodenum.  The endoscope was then withdrawn into the gastric antrum and placed in a retroflexed position.  The endoscope was then righted, and air was suctioned from the stomach.  The endoscope was then withdrawn from the patient, with careful visual inspection of the mucosa revealing no additional pathologic findings.  The patient tolerated the procedure without apparent procedural complications.  The patient left the procedure room in stable condition for recovery.  Findings:   Esophagus: The mucosa was normal.   GE junction: The GE junction is normal.  The Z line is regular.  There is no hiatal hernia.    Stomach:  The gastric body, antrum, fundus, cardia, and angularis were normal.  Biopsies were obtained from the antrum, body, and fundus, to evaluate for H.pylori.   Duodenum: There are two ulcers in the duodenal bulb, both being clean-based and without any bleeding.  The are on opposing walls of the duodenum, and one is 4 mm, while the other is 10 mm.  The descending duodenum is normal.    Impression: Findings as  above  Recommendations:   PPI BID (continue)  IV iron x 3 doses (continue)  Start clear liquid diet, advance tomorrow  Start sucralfate qid  Await pathology; treat H pylori if positive    Laron Pichardo MD

## 2024-12-16 NOTE — ANESTHESIA POSTPROCEDURE EVALUATION
[facility]    Moises Olsen Patient Status:  Observation   Age/Gender 64 year old male MRN [id number]   Location [facility] Attending Fly Bingham DO   Hosp Day # 0 PCP No primary care provider on file.       Anesthesia Post-op Note    ESOPHAGOGASTRODUODENOSCOPY (EGD) with biopsies    Procedure Summary       Date: 12/16/24 Room / Location:  ENDOSCOPY 02 /  ENDOSCOPY    Anesthesia Start: 1032 Anesthesia Stop:     Procedure: ESOPHAGOGASTRODUODENOSCOPY (EGD) with biopsies Diagnosis: (duodenal ulcers)    Surgeons: Laron Pichardo MD Anesthesiologist: Braeden Alvarez MD    Anesthesia Type: MAC ASA Status: 2            Anesthesia Type: MAC    Vitals Value Taken Time   /58 12/16/24 1043   Temp available 12/16/24 1043   Pulse 65 12/16/24 1043   Resp 16 12/16/24 1043   SpO2 94% 12/16/24 1043       Patient Location: Endoscopy    Anesthesia Type: MAC    Airway Patency: patent    Postop Pain Control: adequate    Mental Status: mildly sedated but able to meaningfully participate in the post-anesthesia evaluation    Nausea/Vomiting: none    Cardiopulmonary/Hydration status: stable euvolemic    Complications: no apparent anesthesia related complications    Postop vital signs: stable    Dental Exam: Unchanged from Preop    Patient to be discharged from PACU when criteria met.

## 2024-12-16 NOTE — PROGRESS NOTES
12/15/24 1610 12/15/24 1612 12/15/24 1614   Vital Signs   /64 128/75 119/67   MAP (mmHg) 82 92 84   BP Location Right arm Right arm Right arm   BP Method Automatic Automatic Automatic   Patient Position Lying Sitting Standing

## 2024-12-16 NOTE — PLAN OF CARE
Assumed care of patient 1930    Patient alert and oriented x4. On RA with adequate sats during the day, CPAP at night; prefers 2L O2 via n.c. tonight. NSR on tele. Continent bowel and bladder. Denies epigastric pain at this time. Up Ad lemuel. Call light within reach. Fall precautions in place. Makes needs known.    Plan  NPO midnight  IVFs    Problem: Patient/Family Goals  Goal: Patient/Family Long Term Goal  Description: Patient's Long Term Goal: prevent readmission    Interventions:  - medications as prescribed  - follow up as recommended  - See additional Care Plan goals for specific interventions  Outcome: Progressing  Goal: Patient/Family Short Term Goal  Description: Patient's Short Term Goal: discharge home    Interventions:   - tests and procedures as ordered  -monitor hgb  -watch for dark stools  -medications as prescribed  - See additional Care Plan goals for specific interventions  Outcome: Progressing     Problem: GASTROINTESTINAL - ADULT  Goal: Minimal or absence of nausea and vomiting  Description: INTERVENTIONS:  - Maintain adequate hydration with IV or PO as ordered and tolerated  - Nasogastric tube to low intermittent suction as ordered  - Evaluate effectiveness of ordered antiemetic medications  - Provide nonpharmacologic comfort measures as appropriate  - Advance diet as tolerated, if ordered  - Obtain nutritional consult as needed  - Evaluate fluid balance  Outcome: Progressing  Goal: Maintains or returns to baseline bowel function  Description: INTERVENTIONS:  - Assess bowel function  - Maintain adequate hydration with IV or PO as ordered and tolerated  - Evaluate effectiveness of GI medications  - Encourage mobilization and activity  - Obtain nutritional consult as needed  - Establish a toileting routine/schedule  - Consider collaborating with pharmacy to review patient's medication profile  Outcome: Progressing  Goal: Maintains adequate nutritional intake (undernourished)  Description:  INTERVENTIONS:  - Monitor percentage of each meal consumed  - Identify factors contributing to decreased intake, treat as appropriate  - Assist with meals as needed  - Monitor I&O, WT and lab values  - Obtain nutritional consult as needed  - Optimize oral hygiene and moisture  - Encourage food from home; allow for food preferences  - Enhance eating environment  Outcome: Progressing  Goal: Achieves appropriate nutritional intake (bariatric)  Description: INTERVENTIONS:  - Monitor for over-consumption  - Identify factors contributing to increased intake, treat as appropriate  - Monitor I&O, WT and lab values  - Obtain nutritional consult as needed  - Evaluate psychosocial factors contributing to over-consumption  Outcome: Progressing

## 2024-12-16 NOTE — PLAN OF CARE
Assumed care of patient at 1600. Pt alert and oriented x4. RA. CPAP while sleeping. Normal sinus rhythm on tele. Denies pain or discomfort. Pt oriented to room and call light. Updated on plan of care, all questions answered. Call light within reach.    Problem: CARDIOVASCULAR - ADULT  Goal: Maintains optimal cardiac output and hemodynamic stability  Description: INTERVENTIONS:  - Monitor vital signs, rhythm, and trends  - Monitor for bleeding, hypotension and signs of decreased cardiac output  - Evaluate effectiveness of vasoactive medications to optimize hemodynamic stability  - Monitor arterial and/or venous puncture sites for bleeding and/or hematoma  - Assess quality of pulses, skin color and temperature  - Assess for signs of decreased coronary artery perfusion - ex. Angina  - Evaluate fluid balance, assess for edema, trend weights  Outcome: Progressing  Goal: Absence of cardiac arrhythmias or at baseline  Description: INTERVENTIONS:  - Continuous cardiac monitoring, monitor vital signs, obtain 12 lead EKG if indicated  - Evaluate effectiveness of antiarrhythmic and heart rate control medications as ordered  - Initiate emergency measures for life threatening arrhythmias  - Monitor electrolytes and administer replacement therapy as ordered  Outcome: Progressing     Problem: Patient/Family Goals  Goal: Patient/Family Long Term Goal  Description: Patient's Long Term Goal: prevent readmission    Interventions:  - medications as prescribed  - follow up as recommended  - See additional Care Plan goals for specific interventions  Outcome: Progressing  Goal: Patient/Family Short Term Goal  Description: Patient's Short Term Goal: discharge home    Interventions:   - tests and procedures as ordered  -monitor hgb  -watch for dark stools  -medications as prescribed  - See additional Care Plan goals for specific interventions  Outcome: Progressing     Problem: GASTROINTESTINAL - ADULT  Goal: Minimal or absence of nausea and  vomiting  Description: INTERVENTIONS:  - Maintain adequate hydration with IV or PO as ordered and tolerated  - Nasogastric tube to low intermittent suction as ordered  - Evaluate effectiveness of ordered antiemetic medications  - Provide nonpharmacologic comfort measures as appropriate  - Advance diet as tolerated, if ordered  - Obtain nutritional consult as needed  - Evaluate fluid balance  Outcome: Progressing  Goal: Maintains or returns to baseline bowel function  Description: INTERVENTIONS:  - Assess bowel function  - Maintain adequate hydration with IV or PO as ordered and tolerated  - Evaluate effectiveness of GI medications  - Encourage mobilization and activity  - Obtain nutritional consult as needed  - Establish a toileting routine/schedule  - Consider collaborating with pharmacy to review patient's medication profile  Outcome: Progressing  Goal: Maintains adequate nutritional intake (undernourished)  Description: INTERVENTIONS:  - Monitor percentage of each meal consumed  - Identify factors contributing to decreased intake, treat as appropriate  - Assist with meals as needed  - Monitor I&O, WT and lab values  - Obtain nutritional consult as needed  - Optimize oral hygiene and moisture  - Encourage food from home; allow for food preferences  - Enhance eating environment  Outcome: Progressing  Goal: Achieves appropriate nutritional intake (bariatric)  Description: INTERVENTIONS:  - Monitor for over-consumption  - Identify factors contributing to increased intake, treat as appropriate  - Monitor I&O, WT and lab values  - Obtain nutritional consult as needed  - Evaluate psychosocial factors contributing to over-consumption  Outcome: Progressing

## 2024-12-16 NOTE — DISCHARGE INSTRUCTIONS
Gastroenterology Follow-Up  Please take pantoprazole 40 mg tablet twice daily, 30 min prior to breakfast and again 30 min prior to dinner.    The prescription was sent to your pharmacy. If the prescription is not covered by your pharmacy, please purchase the over the counter option(s), such as Prilosec OTC or Nexium OTC and take twice daily as instructed above, and call my office so we can start prior-authorization process.   Take sucralfate 1000 mg tablet four times daily.  Stop smoking.  Avoid NSAIDs.  [facility] in 1 month.  Repeat EGD in 8 weeks.      General Medical Follow up:  [facility] (healthcare clinic) [email or web address]  [facility] welcomes patients with Medicaid, Medicare, private insurance or no insurance at all, possibly at a discounted rate. [facility] offers low cost prescriptions drugs when seen by a [facility] physician.   [facility] [address] [place] [address] [phone]  [facility] [address], [place] [address] [phone]  [facility] [address] [address] [phone]  [facility] [address], [address] [place] [address]  Ruby rose giovanna aqui o llame al teléfono [phone] o al [phone].     [facility] provides care for chronic disease management, and offers coordinated, patient-centered care.  Their services include adult health, pediatric health, women's health, dental services, behavioral health services and LGBTQ+ health.  [facility] works to eliminate any barriers that may keep our patients from accessing services. We do this by providing 24-hour access to providers, comprehensive care management, transportation assistance, access to reduced-price pharmaceuticals, on-site laboratories, same and next-day appointments,  bilingual staff and translation services.    [facility]- [address]- [address], [facility]- [address] [facility]- [address] [facility]- [address] [facility], [facility]- [address] [facility]- [address] [facility]- [address], [facility]- [address], [facility]- [address], [place], [place]     [email or web address]  Main number [phone]    Visit [employer] for discounted prescription drug coupons or [employer] for $4 prescriptions https://[email or web address]-dollar-prescriptions/7145441    To inquire about [place], call [facility] [phone] or visit [email or web address]    Healthcare.gov - Marketplace insurance    The Extra Help Program: is designed to help eligible Medicare Part D patients with high out of pocket costs. Contact this program directly by calling [employer] at 1-843.627.3235    Through the Medicare Plan Finder, you can search to find more information about cost and your specific prescription coverage.  www.medicare.gov/find-a-plan.    [facility] Health Care Program (only apply if you do not Qualify for medicaid)  [facility]  [address] [place] [address]; [address]  Ebony Leary  Phone: [phone]  [email or web address]    [facility]  [address] [place] [address]  [phone]  [email or web address]    Questions about financial assistance, application, or uninsured discounts can be directed to [phone]

## 2024-12-16 NOTE — PROGRESS NOTES
[facility]   part of [facility]     Hospitalist Progress Note     Moises Olsen Patient Status:  Observation    7/15/1960 MRN [id number]   Location [facility] 2NE-A Attending Fly Bingham,    Hosp Day # 0 PCP No primary care provider on file.     Chief Complaint: Dark stool    Subjective:     Patient currently denies pain. No BM since coming to the hospital.     Objective:    Review of Systems:   A comprehensive review of systems was completed; pertinent positive and negatives stated in subjective.    Vital signs:  Temp:  [98 °F (36.7 °C)-98.3 °F (36.8 °C)] 98.3 °F (36.8 °C)  Pulse:  [60-83] 60  Resp:  [15-32] 23  BP: (119-150)/(64-75) 128/69  SpO2:  [91 %-96 %] 91 %    Physical Exam:    General: No acute distress, awake and alert  Respiratory: No rhonchi, no wheezes  Cardiovascular: S1, S2. Regular rate and rhythm  Abdomen: Soft, Non-tender, non-distended, positive bowel sounds  Neuro: MONIQUE x 4  Extremities: No edema    Diagnostic Data:    Labs:  Recent Labs   Lab 12/15/24  1344 24  0549   WBC 7.2 6.1   HGB 11.8* 10.6*   MCV 78.0* 80.7   .0 174.0   INR 1.25*  --      Recent Labs   Lab 12/15/24  1344   *   BUN 15   CREATSERUM 0.79   CA 9.6   ALB 4.0      K 3.7      CO2 28.0   ALKPHO 64   AST 29   ALT 29   BILT 0.4   TP 6.6     Estimated Creatinine Clearance: 88.3 mL/min (based on SCr of 0.79 mg/dL).    No results for input(s): \"TROP\", \"TROPHS\", \"CK\" in the last 168 hours.    Recent Labs   Lab 12/15/24  1344   PTP 15.9*   INR 1.25*     Microbiology  No results found for this visit on 12/15/24.    Imaging: Reviewed in Epic.    Medications:    pantoprazole  40 mg Intravenous Q12H    atorvastatin  40 mg Oral Nightly    losartan  100 mg Oral Daily    sodium ferric gluconate  125 mg Intravenous Daily       Assessment & Plan:      #Dark stool suspect due to upper GI bleed  #Hx of GI bleed due to ulcer earlier this year  -GI consult, EGD today  -IV PPI BID  -NPO,  IVF  -Monitor hgb and transfuse if under 7     #Anemia with iron deficiency  -IV iron     #Prediabetes  -Most recent A1c is 6.0     #Hypertension  -Resume home losartan but hold hydrochlorothiazide      #Hyperlipidemia  -Statin      Fly Bingham, DO    Supplementary Documentation:     Quality:  DVT Mechanical Prophylaxis:   SCDs,    DVT Pharmacologic Prophylaxis   Medication   None   Code Status: Full  Sutton: No urinary catheter in place  CA: 0-1 day    Discharge is dependent on: Clinical state, consultant recs  At this point Mr. Olsen is expected to be discharge to: Home    The 21st Century Cures Act makes medical notes like these available to patients in the interest of transparency. Please be advised this is a medical document. Medical documents are intended to carry relevant information, facts as evident, and the clinical opinion of the practitioner. The medical note is intended as peer to peer communication and may appear blunt or direct. It is written in medical language and may contain abbreviations or verbiage that are unfamiliar.

## 2024-12-16 NOTE — CONSULTS
Consultation Note        Moises Olsen Patient Status:  Observation    7/15/1960 MRN [id number]   [facility] 2NE-A Attending Fly Bingham,    Hosp Day # 0 PCP No primary care provider on file.       Reason for Consultation   melena       History of Present Illness   Moises Olsen is a 64 year old male with PmHx of peptic ulcer disease, DM, HTN, HLD who presented to the ER due to melena for 3 days. Upon arrival, Hgb 11.8, iron 43, 10% saturation, ferritin 19. CBC this morning was 10.6. He reports he had epigastric pain as well, which has now resolved. He denies nausea or vomiting. No dysphagia or odynophagia. He reports regular bowel movements. No BRBPR. He denies use of NSAIDs or anticoagulation. No known family history of GI malignancy. He reports he had an colonoscopy at [facility] in September which showed some polyps and he had an EGD in February which revealed a duodenal ulcer. He was given a prescription for PPI but was not told to continue on this long-term. Prior work-up here in  included EGD (Dr Pichardo) for melena which revealed erosions in the antrum, large ulcer on the posterior wall of the duodenal bulb-clean-based, no vessel or clot, no active bleeding. His H pylori testing was positive and he was given prescriptions for treatment. It was recommended he repeat EGD but he was lost to follow-up.          PMH/MEDS/ALLERGIES/SH/FH:     Past Medical History:    Anemia    Diabetes (HCC)    Essential hypertension    GI bleed    History of blood transfusion    Hyperlipidemia      History reviewed. No pertinent surgical history.     No recently discontinued medications to reconcile   Medications Ordered Prior to Encounter[1]    Current Allergies: Allergies[2]    Social History     Occupational History    Not on file   Tobacco Use    Smoking status: Former     Current packs/day: 0.50     Average packs/day: 0.5 packs/day for 30.0 years (15.0 ttl pk-yrs)     Types: Cigarettes     Smokeless tobacco: Never    Tobacco comments:     6-7 cigarettes a day   Vaping Use    Vaping status: Every Day   Substance and Sexual Activity    Alcohol use: Not Currently    Drug use: Not on file    Sexual activity: Not on file           No family history on file.           MEDICATIONS      [COMPLETED] sodium chloride 0.9 % IV bolus 1,000 mL  1,000 mL Intravenous Once    [COMPLETED] pantoprazole (Protonix) 40 mg in sodium chloride 0.9% PF 10 mL IV push  40 mg Intravenous Once    pantoprazole (Protonix) 40 mg in sodium chloride 0.9% PF 10 mL IV push  40 mg Intravenous Q12H    sodium chloride 0.9% infusion   Intravenous Continuous    atorvastatin (Lipitor) tab 40 mg  40 mg Oral Nightly    losartan (Cozaar) tab 100 mg  100 mg Oral Daily    acetaminophen (Tylenol Extra Strength) tab 500 mg  500 mg Oral Q4H PRN    morphINE PF 2 MG/ML injection 1 mg  1 mg Intravenous Q4H PRN    dicyclomine (Bentyl) cap 10 mg  10 mg Oral TID PRN    melatonin tab 3 mg  3 mg Oral Nightly PRN    polyethylene glycol (PEG 3350) (Miralax) 17 g oral packet 17 g  17 g Oral Daily PRN    sennosides (Senokot) tab 17.2 mg  17.2 mg Oral Nightly PRN    bisacodyl (Dulcolax) 10 MG rectal suppository 10 mg  10 mg Rectal Daily PRN    fleet enema (Fleet) rectal enema 133 mL  1 enema Rectal Once PRN    ondansetron (Zofran) 4 MG/2ML injection 4 mg  4 mg Intravenous Q6H PRN    prochlorperazine (Compazine) 10 MG/2ML injection 5 mg  5 mg Intravenous Q8H PRN    benzonatate (Tessalon) cap 200 mg  200 mg Oral TID PRN    guaiFENesin (Robitussin) 100 MG/5 ML oral liquid 200 mg  200 mg Oral Q4H PRN    glycerin-hypromellose- (Artificial Tears) 0.2-0.2-1 % ophthalmic solution 1 drop  1 drop Both Eyes QID PRN    sodium chloride (Saline Mist) 0.65 % nasal solution 1 spray  1 spray Each Nare Q3H PRN    sodium ferric gluconate (Ferrlecit) 125 mg in sodium chloride 0.9% 100mL IVPB premix  125 mg Intravenous Daily              ROS:     A comprehensive 14 point review  of systems was completed.  Pertinent positives and negatives noted in the the HPI.          Physical Exam     Vital signs:  /72 (BP Location: Left arm)   Pulse 72   Temp 98.4 °F (36.9 °C) (Oral)   Resp 22   Ht 5' 7\" (1.702 m)   Wt 210 lb (95.3 kg)   SpO2 90%   BMI 32.89 kg/m²         Physical Exam        General: Appears alert, oriented x 3 and in no acute distress.  HEENT: Normal. No scleral icterus.   CV: S1 and S2 normal.   LUNGS: Clear to auscultation.  ABDOMEN: Soft and obese. Non-tender. No masses. Bowel sounds are present.  EXTREMITIES: No edema, cyanosis or clubbing.  SKIN: Warm and dry.         IMAGING/LABS       Labs:   Lab Results   Component Value Date    WBC 6.1 12/16/2024    HGB 10.6 12/16/2024    HCT 35.2 12/16/2024    .0 12/16/2024    CREATSERUM 0.79 12/15/2024    BUN 15 12/15/2024     12/15/2024    K 3.7 12/15/2024     12/15/2024    CO2 28.0 12/15/2024     12/15/2024    CA 9.6 12/15/2024    ALB 4.0 12/15/2024    ALKPHO 64 12/15/2024    BILT 0.4 12/15/2024    AST 29 12/15/2024    ALT 29 12/15/2024    PTT 35.0 12/15/2024    INR 1.25 12/15/2024    PTP 15.9 12/15/2024     Recent Labs   Lab 12/15/24  1344   *   BUN 15   CREATSERUM 0.79   CA 9.6      K 3.7      CO2 28.0     Recent Labs   Lab 12/16/24  0549   RBC 4.36   HGB 10.6*   HCT 35.2*   MCV 80.7   MCH 24.3*   MCHC 30.1*   RDW 19.3   NEPRELIM 3.25   WBC 6.1   .0       Recent Labs   Lab 12/15/24  1344   ALT 29   AST 29       Imaging:   No image results found.            IMPRESSION:     Patient is a 64 year old male who presents due to melena for 3 days and epigastric pain (now resolved). He is hemodynamically stable. Hgb down-trending from 11.8 on arrival to 10.6 with associated iron deficiency.   Melena: likely PUD, rule out H pylori, AVM, neoplasm but less likely  H/o H pylori  H/o duodenal ulcer in 2019 and 02/2024 per patient report  DM            PLAN:     Remain NPO  PPI IV  BID  EGD today with Dr Pichardo  IV fluids, antiemetics, and analgesics per primary care  Agree with iron supplementation as ordered by primary service   Continue to monitor Hgb and transfuse for Hgb < 7         RAMYA Dickson  9:05 AM  12/16/2024  [facility]  [phone]          Physician Addendum  This patient was seen and examined independently, then discussed with SELINA Dickson.  The plan was discussed with SELINA and her note above was reviewed.  In summary, pt with history of duodenal ulcer (in 2019 and again earlier this year), remote history of H pylori (never documented eradication), who presents with anemia and black stools.  He has no abd pain.  On exam, resting comfortably.  Plan for EGD with MAC today.  Continue PPI BID.  Further recs to follow.    Laron Pichardo MD       [1]   No current facility-administered medications on file prior to encounter.     Current Outpatient Medications on File Prior to Encounter   Medication Sig Dispense Refill    Ferrous Sulfate (IRON) 325 (65 Fe) MG Oral Tab Take by mouth.      Losartan Potassium-HCTZ 100-25 MG Oral Tab Take 1 tablet by mouth daily. 90 tablet 1    atorvastatin 40 MG Oral Tab Take 1 tablet (40 mg total) by mouth nightly. 90 tablet 1    rosuvastatin 40 MG Oral Tab Take 1 tablet (40 mg total) by mouth nightly. (Patient not taking: Reported on 12/15/2024)      predniSONE 10 MG Oral Tab 4 tabs daily with food 2 days then 3 tabs for 2 days, then 2 tabs for 2 days then 1 tab for 2 days. 20 tablet 0   [2] No Known Allergies

## 2024-12-17 VITALS
SYSTOLIC BLOOD PRESSURE: 152 MMHG | OXYGEN SATURATION: 93 % | RESPIRATION RATE: 20 BRPM | DIASTOLIC BLOOD PRESSURE: 73 MMHG | BODY MASS INDEX: 32.96 KG/M2 | HEART RATE: 76 BPM | HEIGHT: 67 IN | TEMPERATURE: 98 F | WEIGHT: 210 LBS

## 2024-12-17 LAB
BASOPHILS # BLD AUTO: 0.04 X10(3) UL (ref 0–0.2)
BASOPHILS NFR BLD AUTO: 0.8 %
EOSINOPHIL # BLD AUTO: 0.23 X10(3) UL (ref 0–0.7)
EOSINOPHIL NFR BLD AUTO: 4.4 %
ERYTHROCYTE [DISTWIDTH] IN BLOOD BY AUTOMATED COUNT: 19.4 %
HCT VFR BLD AUTO: 36 %
HGB BLD-MCNC: 11 G/DL
IMM GRANULOCYTES # BLD AUTO: 0.02 X10(3) UL (ref 0–1)
IMM GRANULOCYTES NFR BLD: 0.4 %
LYMPHOCYTES # BLD AUTO: 1.24 X10(3) UL (ref 1–4)
LYMPHOCYTES NFR BLD AUTO: 23.8 %
MCH RBC QN AUTO: 24.5 PG (ref 26–34)
MCHC RBC AUTO-ENTMCNC: 30.6 G/DL (ref 31–37)
MCV RBC AUTO: 80.2 FL
MONOCYTES # BLD AUTO: 0.84 X10(3) UL (ref 0.1–1)
MONOCYTES NFR BLD AUTO: 16.2 %
NEUTROPHILS # BLD AUTO: 2.83 X10 (3) UL (ref 1.5–7.7)
NEUTROPHILS # BLD AUTO: 2.83 X10(3) UL (ref 1.5–7.7)
NEUTROPHILS NFR BLD AUTO: 54.4 %
PLATELET # BLD AUTO: 175 10(3)UL (ref 150–450)
PLATELETS.RETICULATED NFR BLD AUTO: 6.5 % (ref 0–7)
RBC # BLD AUTO: 4.49 X10(6)UL
WBC # BLD AUTO: 5.2 X10(3) UL (ref 4–11)

## 2024-12-17 PROCEDURE — 99239 HOSP IP/OBS DSCHRG MGMT >30: CPT | Performed by: INTERNAL MEDICINE

## 2024-12-17 RX ORDER — PANTOPRAZOLE SODIUM 40 MG/1
40 TABLET, DELAYED RELEASE ORAL
Qty: 60 TABLET | Refills: 1 | Status: SHIPPED | OUTPATIENT
Start: 2024-12-17

## 2024-12-17 RX ORDER — SUCRALFATE ORAL 1 G/10ML
1 SUSPENSION ORAL
Qty: 414 ML | Refills: 0 | Status: SHIPPED | OUTPATIENT
Start: 2024-12-17 | End: 2024-12-27

## 2024-12-17 NOTE — PROGRESS NOTES
Progress Note        Moises Olsen Patient Status:  Observation    7/15/1960 MRN [id number]   [facility] 2NE-A Attending Fly Bingham DO   Hosp Day # 0 PCP No primary care provider on file.       Chief Complaint   melena       Subjective     No acute events overnight. Tolerated regular breakfast without nausea or vomiting. Epigastric pain resolved. BM this morning was brown in color       PMH/MEDS/ALLERGIES/SH/FH:     Past Medical History:    Anemia    Diabetes (HCC)    Essential hypertension    GI bleed    History of blood transfusion    Hyperlipidemia      History reviewed. No pertinent surgical history.     No recently discontinued medications to reconcile   Medications Ordered Prior to Encounter[1]    Current Allergies: Allergies[2]    Social History     Occupational History    Not on file   Tobacco Use    Smoking status: Former     Current packs/day: 0.50     Average packs/day: 0.5 packs/day for 30.0 years (15.0 ttl pk-yrs)     Types: Cigarettes    Smokeless tobacco: Never    Tobacco comments:     6-7 cigarettes a day   Vaping Use    Vaping status: Every Day   Substance and Sexual Activity    Alcohol use: Not Currently    Drug use: Not on file    Sexual activity: Not on file           No family history on file.           MEDICATIONS      sucralfate (Carafate) 1 GM/10ML oral suspension 1 g  1 g Oral TID AC and HS (2200)    [COMPLETED] sodium chloride 0.9 % IV bolus 1,000 mL  1,000 mL Intravenous Once    [COMPLETED] pantoprazole (Protonix) 40 mg in sodium chloride 0.9% PF 10 mL IV push  40 mg Intravenous Once    pantoprazole (Protonix) 40 mg in sodium chloride 0.9% PF 10 mL IV push  40 mg Intravenous Q12H    [] sodium chloride 0.9% infusion   Intravenous Continuous    atorvastatin (Lipitor) tab 40 mg  40 mg Oral Nightly    losartan (Cozaar) tab 100 mg  100 mg Oral Daily    acetaminophen (Tylenol Extra Strength) tab 500 mg  500 mg Oral Q4H PRN    morphINE PF 2 MG/ML injection 1 mg   1 mg Intravenous Q4H PRN    dicyclomine (Bentyl) cap 10 mg  10 mg Oral TID PRN    melatonin tab 3 mg  3 mg Oral Nightly PRN    polyethylene glycol (PEG 3350) (Miralax) 17 g oral packet 17 g  17 g Oral Daily PRN    sennosides (Senokot) tab 17.2 mg  17.2 mg Oral Nightly PRN    bisacodyl (Dulcolax) 10 MG rectal suppository 10 mg  10 mg Rectal Daily PRN    fleet enema (Fleet) rectal enema 133 mL  1 enema Rectal Once PRN    ondansetron (Zofran) 4 MG/2ML injection 4 mg  4 mg Intravenous Q6H PRN    prochlorperazine (Compazine) 10 MG/2ML injection 5 mg  5 mg Intravenous Q8H PRN    benzonatate (Tessalon) cap 200 mg  200 mg Oral TID PRN    guaiFENesin (Robitussin) 100 MG/5 ML oral liquid 200 mg  200 mg Oral Q4H PRN    glycerin-hypromellose- (Artificial Tears) 0.2-0.2-1 % ophthalmic solution 1 drop  1 drop Both Eyes QID PRN    sodium chloride (Saline Mist) 0.65 % nasal solution 1 spray  1 spray Each Nare Q3H PRN    sodium ferric gluconate (Ferrlecit) 125 mg in sodium chloride 0.9% 100mL IVPB premix  125 mg Intravenous Daily              ROS:     A comprehensive 14 point review of systems was completed.  Pertinent positives and negatives noted in the the HPI.          Physical Exam     Vital signs:  BP (!) 147/95 (BP Location: Left arm)   Pulse 64   Temp 98.2 °F (36.8 °C) (Oral)   Resp 19   Ht 5' 7\" (1.702 m)   Wt 210 lb (95.3 kg)   SpO2 94%   BMI 32.89 kg/m²         Physical Exam        General: Appears alert, oriented x 3 and in no acute distress.  HEENT: Normal. No scleral icterus.   NECK: Supple. No neck vein distention.  CV: S1 and S2 normal.   LUNGS: Clear to auscultation.  ABDOMEN: Soft and non-distended. Non-tender. No masses. Bowel sounds are present.  EXTREMITIES: No edema, cyanosis or clubbing.  SKIN: Warm and dry.         IMAGING/LABS       Labs:   Lab Results   Component Value Date    WBC 5.2 12/17/2024    HGB 11.0 12/17/2024    HCT 36.0 12/17/2024    .0 12/17/2024     Recent Labs   Lab  12/15/24  1344   *   BUN 15   CREATSERUM 0.79   CA 9.6      K 3.7      CO2 28.0     Recent Labs   Lab 12/17/24  0533   RBC 4.49   HGB 11.0*   HCT 36.0*   MCV 80.2   MCH 24.5*   MCHC 30.6*   RDW 19.4   NEPRELIM 2.83   WBC 5.2   .0       Recent Labs   Lab 12/15/24  1344   ALT 29   AST 29       Imaging:   No image results found.            IMPRESSION:   Patient is a 64 year old male who presented to  due to melena and epigastric pain (now resolved). EGD yesterday with 2 duodenal ulcers in duodenal bulb, 4mm and 10mm on opposing walls. Pathology pending. He continues on PPI BID. He is receiving his third dose of IV iron. Tolerating general diet. Hgb 11 this morning (up from 10.6 yesterday).  H/o H pylori  H/o duodenal ulcer in 2019 and 02/2024 per patient report  DM           PLAN:     Remain on pantoprazole 40mg twice daily for 8 weeks   Repeat EGD in 8 weeks as outpatient   Continue sucralfate QID for 7 days  OK to discharge from GI perspective later this afternoon. Outpatient follow-up in 4 weeks with GI nurse practitioner   Avoid NSAIDs   Await pathology results         RAMYA Dickson  8:50 AM  12/17/2024  [facility]  [phone]            Physician Addendum  This patient was seen and examined independently, then discussed with Yoly BUI.  The plan was discussed with APN and her note above was reviewed.  In summary, pt doing well.  Eager to go home.  Soft abdomen.  No melena.  Hgb stable.  Reviewed discharge plans, printed instructions provided.    Laron Pichardo MD         [1]   No current facility-administered medications on file prior to encounter.     Current Outpatient Medications on File Prior to Encounter   Medication Sig Dispense Refill    Ferrous Sulfate (IRON) 325 (65 Fe) MG Oral Tab Take by mouth.      Losartan Potassium-HCTZ 100-25 MG Oral Tab Take 1 tablet by mouth daily. 90 tablet 1    atorvastatin 40 MG Oral Tab Take 1 tablet (40 mg total)  by mouth nightly. 90 tablet 1    rosuvastatin 40 MG Oral Tab Take 1 tablet (40 mg total) by mouth nightly. (Patient not taking: Reported on 12/15/2024)      predniSONE 10 MG Oral Tab 4 tabs daily with food 2 days then 3 tabs for 2 days, then 2 tabs for 2 days then 1 tab for 2 days. 20 tablet 0   [2] No Known Allergies

## 2024-12-17 NOTE — PROGRESS NOTES
Acquired patient from [facility] around 1050. Alert and oriented x4. On Ra. SPO2 within normal limits. NSR/ SB on tele. Continent of bowel and bladder. Plan to discharge today, patient aware planning form of transportation. Call light within reach. Continue plan of care.

## 2024-12-17 NOTE — PLAN OF CARE
Patient seen and examined. Last BM was not dark. No further epigastric pain. Plan for discharge today.    Fly Bingham, DO

## 2024-12-17 NOTE — DISCHARGE SUMMARY
[facility]     Moises Olsen Patient Status:  Observation    7/15/1960 MRN [id number]   Location [facility] 2NE-A Attending Fly Bingham,    Hosp Day # 0 PCP No primary care provider on file.     Date of Admission: 12/15/2024  Date of Discharge: 2024  Discharge Disposition: Home or Self Care    Discharge Diagnosis:   Upper GI bleed  Duodenal ulcers  Hx of H pylori  Iron deficiency anemia  Prediabetes with A1c 6.0  Hypertension  Hyperlipidemia    History of Present Illness: Moises Olsen is a 64 year old male with PMHx peptic ulcer disease with prior GI bleed requiring blood transfusion, prediabetes, hypertension and hyperlipidemia who presents to the hospital for evaluation of dark stool.  Symptoms been ongoing since Friday morning and he has had 3 episodes of dark stool along with epigastric discomfort but not actual pain.  He denies any fever, chills, nausea, vomiting, diarrhea, chest pain, shortness of breath or dizziness.  Patient was hospitalized in 2024 at [facility] when they found the ulcer and he required blood transfusion at that time.  He was again admitted with dark stool in September.  In the ER, he is afebrile and hemodynamically stable CMP unremarkable and INR is 1.25.  Hemoglobin is 11.8 with MCV 78.     Brief Synopsis: Patient presented with dark stool.  Hemoglobin stable and he was started on PPI IV twice daily.  GI consulted and he underwent EGD showing duodenal ulcer.  He was started on Carafate along with PPI twice daily.  He was recommended to follow-up with GI outpatient and repeat EGD in 8 weeks.    Lace+ Score: 39  59-90 High Risk  29-58 Medium Risk  0-28   Low Risk  Patient was referred to the [facility].    TCM Follow-Up Recommendation:  LACE 29-58: Moderate Risk of readmission after discharge from the hospital.    Procedures during hospitalization:   EGD    Incidental or significant findings and  recommendations (brief descriptions):  Duodenal ulcer - take PPI BID and carafate QID. Recommend EGD in 8 weeks and outpatient GI f/u in 4 weeks    Lab/Test results pending at Discharge:   Path from EGD    Consultants:  GI    Discharge Medication List:     Discharge Medications        START taking these medications        Instructions Prescription details   pantoprazole 40 MG Tbec  Commonly known as: Protonix      Take 1 tablet (40 mg total) by mouth 2 (two) times daily before meals.   Quantity: 60 tablet  Refills: 1     sucralfate 1 GM/10ML Susp  Commonly known as: Carafate      Take 10 mL (1 g total) by mouth 4 (four) times daily before meals and nightly (2200) for 10 days.   Stop taking on: December 27, 2024  Quantity: 414 mL  Refills: 0            CONTINUE taking these medications        Instructions Prescription details   atorvastatin 40 MG Tabs  Commonly known as: Lipitor      Take 1 tablet (40 mg total) by mouth nightly.   Quantity: 90 tablet  Refills: 1     Iron 325 (65 Fe) MG Tabs      Take by mouth.   Refills: 0     losartan-hydroCHLOROthiazide 100-25 MG Tabs  Commonly known as: Hyzaar      Take 1 tablet by mouth daily.   Quantity: 90 tablet  Refills: 1            STOP taking these medications      predniSONE 10 MG Tabs  Commonly known as: Deltasone        rosuvastatin 40 MG Tabs  Commonly known as: Crestor                  Where to Get Your Medications        These medications were sent to [facility] #[id number] - [facility] [address] [facility], [phone], [phone] [address] [facility] [address]      Phone: [phone]   pantoprazole 40 MG Tbec  sucralfate 1 GM/10ML Susp         [place] No [place] substances prescribed at discharge.    Follow-up appointment:   [facility]  Tammie Self [address] [place] [id number] [phone]  Schedule an appointment as soon as possible for a visit      Primary care doctor    Schedule an  appointment as soon as possible for a visit in 1 week(s)  To establish with an [place] or [facility] doctor, call our Physician Referral line at [phone], Monday through Friday from 7 a.m. to 6 p.m. and Saturdays from 7 a.m. to 1 p.m    Laron Pichardo MD  [address] [place] [address]  [phone]    Schedule an appointment as soon as possible for a visit in 4 week(s)  our office will call you to schedule the follow-up in 4 weeks    Appointments for Next 30 Days 12/17/2024 - 1/16/2025      None            Vital signs:  Temp:  [97.5 °F (36.4 °C)-98.3 °F (36.8 °C)] 98.3 °F (36.8 °C)  Pulse:  [52-68] 63  Resp:  [13-22] 20  BP: (124-159)/(54-95) 159/81  SpO2:  [91 %-95 %] 93 %    Physical Exam:    General: No acute distress. Awake and alert  Respiratory: Clear to auscultation bilaterally. No wheezes. No rhonchi.  Cardiovascular: S1, S2. Regular rate and rhythm.  Abdomen: Soft, nontender, nondistended.  Positive bowel sounds. No rebound or guarding.  Musculoskeletal: Moves all extremities.  Extremities: No edema.  -----------------------------------------------------------------------------------------------  PATIENT DISCHARGE INSTRUCTIONS: See electronic chart    Fly Bingham DO    Time spent:  32 minutes

## 2024-12-17 NOTE — PLAN OF CARE
A&Ox4. O2 sat WNL on RA. 2L needed with sleep. SB on tele. Continent of bladder and bowel. Advancing diet as tolerated. Ate sandwich and denies n/v/pain. States he feels better. Medication given per MAR. Up ad lemuel     Problem: CARDIOVASCULAR - ADULT  Goal: Maintains optimal cardiac output and hemodynamic stability  Description: INTERVENTIONS:  - Monitor vital signs, rhythm, and trends  - Monitor for bleeding, hypotension and signs of decreased cardiac output  - Evaluate effectiveness of vasoactive medications to optimize hemodynamic stability  - Monitor arterial and/or venous puncture sites for bleeding and/or hematoma  - Assess quality of pulses, skin color and temperature  - Assess for signs of decreased coronary artery perfusion - ex. Angina  - Evaluate fluid balance, assess for edema, trend weights  12/17/2024 0111 by Monisha Valerio RN  Outcome: Progressing  12/17/2024 0111 by Monisha Valerio RN  Outcome: Progressing  Goal: Absence of cardiac arrhythmias or at baseline  Description: INTERVENTIONS:  - Continuous cardiac monitoring, monitor vital signs, obtain 12 lead EKG if indicated  - Evaluate effectiveness of antiarrhythmic and heart rate control medications as ordered  - Initiate emergency measures for life threatening arrhythmias  - Monitor electrolytes and administer replacement therapy as ordered  12/17/2024 0111 by Monisha Valerio RN  Outcome: Progressing  12/17/2024 0111 by Monisha Valerio RN  Outcome: Progressing     Problem: Patient/Family Goals  Goal: Patient/Family Long Term Goal  Description: Patient's Long Term Goal: prevent readmission    Interventions:  - medications as prescribed  - follow up as recommended  - See additional Care Plan goals for specific interventions  12/17/2024 0111 by Monisha Valerio RN  Outcome: Progressing  12/17/2024 0111 by Monisha Valerio RN  Outcome: Progressing  Goal: Patient/Family Short Term Goal  Description: Patient's Short Term Goal: discharge  home    Interventions:   - tests and procedures as ordered  -monitor hgb  -watch for dark stools  -medications as prescribed  - See additional Care Plan goals for specific interventions  12/17/2024 0111 by Monisha Valerio RN  Outcome: Progressing  12/17/2024 0111 by Monisha Valerio RN  Outcome: Progressing     Problem: GASTROINTESTINAL - ADULT  Goal: Minimal or absence of nausea and vomiting  Description: INTERVENTIONS:  - Maintain adequate hydration with IV or PO as ordered and tolerated  - Nasogastric tube to low intermittent suction as ordered  - Evaluate effectiveness of ordered antiemetic medications  - Provide nonpharmacologic comfort measures as appropriate  - Advance diet as tolerated, if ordered  - Obtain nutritional consult as needed  - Evaluate fluid balance  12/17/2024 0111 by Monisha Valerio RN  Outcome: Progressing  12/17/2024 0111 by Monisha Valerio RN  Outcome: Progressing  Goal: Maintains or returns to baseline bowel function  Description: INTERVENTIONS:  - Assess bowel function  - Maintain adequate hydration with IV or PO as ordered and tolerated  - Evaluate effectiveness of GI medications  - Encourage mobilization and activity  - Obtain nutritional consult as needed  - Establish a toileting routine/schedule  - Consider collaborating with pharmacy to review patient's medication profile  12/17/2024 0111 by Monisha Valerio RN  Outcome: Progressing  12/17/2024 0111 by Monisha Valerio RN  Outcome: Progressing  Goal: Maintains adequate nutritional intake (undernourished)  Description: INTERVENTIONS:  - Monitor percentage of each meal consumed  - Identify factors contributing to decreased intake, treat as appropriate  - Assist with meals as needed  - Monitor I&O, WT and lab values  - Obtain nutritional consult as needed  - Optimize oral hygiene and moisture  - Encourage food from home; allow for food preferences  - Enhance eating environment  12/17/2024 0111 by Monisha Valerio RN  Outcome:  Progressing  12/17/2024 0111 by Monisha Valerio, RN  Outcome: Progressing  Goal: Achieves appropriate nutritional intake (bariatric)  Description: INTERVENTIONS:  - Monitor for over-consumption  - Identify factors contributing to increased intake, treat as appropriate  - Monitor I&O, WT and lab values  - Obtain nutritional consult as needed  - Evaluate psychosocial factors contributing to over-consumption  12/17/2024 0111 by Monisha Valerio, RN  Outcome: Progressing  12/17/2024 0111 by Monisha Valerio, RN  Outcome: Progressing

## 2024-12-17 NOTE — PLAN OF CARE
Patient alert and oriented x 4. On RA. Up ad lemuel. NSR on tele. Continent of bowel/bladder. No complaints of pain, shortness of breath, chest pain/discomfort. POC: hopeful discharge today. Call light within reach. Fall precautions in place.     Problem: CARDIOVASCULAR - ADULT  Goal: Maintains optimal cardiac output and hemodynamic stability  Description: INTERVENTIONS:  - Monitor vital signs, rhythm, and trends  - Monitor for bleeding, hypotension and signs of decreased cardiac output  - Evaluate effectiveness of vasoactive medications to optimize hemodynamic stability  - Monitor arterial and/or venous puncture sites for bleeding and/or hematoma  - Assess quality of pulses, skin color and temperature  - Assess for signs of decreased coronary artery perfusion - ex. Angina  - Evaluate fluid balance, assess for edema, trend weights  Outcome: Progressing  Goal: Absence of cardiac arrhythmias or at baseline  Description: INTERVENTIONS:  - Continuous cardiac monitoring, monitor vital signs, obtain 12 lead EKG if indicated  - Evaluate effectiveness of antiarrhythmic and heart rate control medications as ordered  - Initiate emergency measures for life threatening arrhythmias  - Monitor electrolytes and administer replacement therapy as ordered  Outcome: Progressing     Problem: Patient/Family Goals  Goal: Patient/Family Long Term Goal  Description: Patient's Long Term Goal: prevent readmission    Interventions:  - medications as prescribed  - follow up as recommended  - See additional Care Plan goals for specific interventions  Outcome: Progressing  Goal: Patient/Family Short Term Goal  Description: Patient's Short Term Goal: discharge home    Interventions:   - tests and procedures as ordered  -monitor hgb  -watch for dark stools  -medications as prescribed  - See additional Care Plan goals for specific interventions  Outcome: Progressing     Problem: GASTROINTESTINAL - ADULT  Goal: Minimal or absence of nausea and  vomiting  Description: INTERVENTIONS:  - Maintain adequate hydration with IV or PO as ordered and tolerated  - Nasogastric tube to low intermittent suction as ordered  - Evaluate effectiveness of ordered antiemetic medications  - Provide nonpharmacologic comfort measures as appropriate  - Advance diet as tolerated, if ordered  - Obtain nutritional consult as needed  - Evaluate fluid balance  Outcome: Progressing  Goal: Maintains or returns to baseline bowel function  Description: INTERVENTIONS:  - Assess bowel function  - Maintain adequate hydration with IV or PO as ordered and tolerated  - Evaluate effectiveness of GI medications  - Encourage mobilization and activity  - Obtain nutritional consult as needed  - Establish a toileting routine/schedule  - Consider collaborating with pharmacy to review patient's medication profile  Outcome: Progressing  Goal: Maintains adequate nutritional intake (undernourished)  Description: INTERVENTIONS:  - Monitor percentage of each meal consumed  - Identify factors contributing to decreased intake, treat as appropriate  - Assist with meals as needed  - Monitor I&O, WT and lab values  - Obtain nutritional consult as needed  - Optimize oral hygiene and moisture  - Encourage food from home; allow for food preferences  - Enhance eating environment  Outcome: Progressing  Goal: Achieves appropriate nutritional intake (bariatric)  Description: INTERVENTIONS:  - Monitor for over-consumption  - Identify factors contributing to increased intake, treat as appropriate  - Monitor I&O, WT and lab values  - Obtain nutritional consult as needed  - Evaluate psychosocial factors contributing to over-consumption  Outcome: Progressing

## 2024-12-17 NOTE — CM/SW NOTE
SW noted and acknowledged consult order for discharge planing to assist with transportation for pt at [facility].     SW met with pt at bedside to discuss transportation concerns. Pt stated he does not have someone that can pick him up when discharged.    SW discussed [employer] as an option as well as [facility] transport. Pt stated he wanted to speak with [facility] to see what time they will be discharging him. Pt stated he wanted to try and see if he can find someone to pick him up depending on what time they clear him for DC, and if he can't find someone to pick him up he will determine if he would like for SW to arrange [employer] transport.    SW to f/u with pt to determine mode of transportation for him at [facility].     to remain available for support and/or discharge planning.    Addendum: TAMI met with pt at bedside to discuss transportation plan. Pt declined [employer] as he is not agreeable with the cost. Pt stated his daughter works here and he is going to have to wait for her to come in later this evening to take her car back home. Pt stated he will take his daughter's car and drive himself home later today and then come and  his daughter tomorrow morning. TAMI updated RN on pt's transportation plan.    AZALEA Moreland  Discharge Planner  [phone]

## 2024-12-17 NOTE — CM/SW NOTE
Pt listed as SELF PAY, screened by [place] [employer]. Pt does not currently meet Medicaid eligibility due to being above income requirements.     Resources listed on AVS for self pay resources. Per chart review, pt has been seen at [facility].     AZALEA Zeng

## 2024-12-18 NOTE — PROGRESS NOTES
Reviewed discharge plan of care with patient. Verbalized understanding of plan that will  medications and place follow ups. IV removed. Tele removed. All questions answered. Patient as discharge transportation.

## 2024-12-19 ENCOUNTER — PATIENT OUTREACH (OUTPATIENT)
Dept: CASE MANAGEMENT | Age: 64
End: 2024-12-19

## 2024-12-19 NOTE — PROGRESS NOTES
Attempted to contact pt for condition update however no answer. Call continued to ring and did not go to a . [facility] to try again at a later time.

## 2024-12-19 NOTE — PROGRESS NOTES
Attempted to contact pt for condition update however no answer. Call did not dial out, was silent and then a busy tone started. Attempted to call twice, busy tone notes both times. NCM to try again at a later time.

## 2024-12-19 NOTE — PROGRESS NOTES
Hospital follow up.    TCC request.    Attempt #1:  Left message on voicemail for patient to call transitions specialist back to schedule follow up appointments. Provided Transitions specialist scheduling phone number [phone].

## 2024-12-20 NOTE — PROGRESS NOTES
Hospital follow up.    TCC request.    Patient will call back to schedule.  Confirmed with patient's daughter.    Closing encounter.

## 2024-12-21 NOTE — PROGRESS NOTES
Negin,  Please schedule appt with patient to see Jose Gooden NP in office after hospital visit.  Visit should be after Jan 1, per his request, as he will have insurance by then.  Let him know he has H pylori gastritis, a treatable infection, and at the time of his office visit, we will provide him with the instructions to treat the infection.  The infection, plus the smoking, are the causes for his recurrent duodenal ulcers.  Until then, he should continue the acid relux meds twice daily.  Thanks,  PM

## 2025-01-16 ENCOUNTER — OFFICE VISIT (OUTPATIENT)
Dept: INTERNAL MEDICINE CLINIC | Facility: CLINIC | Age: 65
End: 2025-01-16
Payer: COMMERCIAL

## 2025-01-16 VITALS
HEIGHT: 67 IN | OXYGEN SATURATION: 99 % | TEMPERATURE: 98 F | BODY MASS INDEX: 35.63 KG/M2 | HEART RATE: 77 BPM | WEIGHT: 227 LBS | SYSTOLIC BLOOD PRESSURE: 148 MMHG | DIASTOLIC BLOOD PRESSURE: 81 MMHG

## 2025-01-16 DIAGNOSIS — Z00.00 ANNUAL PHYSICAL EXAM: Primary | ICD-10-CM

## 2025-01-16 DIAGNOSIS — K92.1 MELENA: ICD-10-CM

## 2025-01-16 PROBLEM — R01.1 UNDIAGNOSED CARDIAC MURMURS: Status: ACTIVE | Noted: 2023-08-09

## 2025-01-16 PROBLEM — I10 HYPERTENSION: Status: ACTIVE | Noted: 2023-08-17

## 2025-01-16 PROCEDURE — 99205 OFFICE O/P NEW HI 60 MIN: CPT | Performed by: INTERNAL MEDICINE

## 2025-01-16 PROCEDURE — 99406 BEHAV CHNG SMOKING 3-10 MIN: CPT | Performed by: INTERNAL MEDICINE

## 2025-01-16 NOTE — PROGRESS NOTES
New Patient Office Visit    CC:    The patient is here to establish care.    HPI:     The patient is 64-year-old male with history of peptic ulcer disease, hypertension, iron deficiency anemia due to acute on chronic blood loss was admitted in the hospital few weeks ago with melena.  The patient was advised to follow-up with gastroenterology in the outpatient basis.  In the past, the patient had peptic ulcer disease and he was treated for Helicobacter pylori as well.    Currently, there is no ongoing melena.    ECHO at [facility] on 11/17/2023- mild to moderate MR. Possible bicuspid aortic valve.    History reviewed. No pertinent surgical history.    Current medications:  #1 pantoprazole 40 mg once daily  #2 rosuvastatin 40 mg daily   #3 losartan/hydrochlorothiazide 100/25 mg    Social History:  Social History     Socioeconomic History    Marital status: Single   Tobacco Use    Smoking status: Former     Current packs/day: 0.50     Average packs/day: 0.5 packs/day for 30.0 years (15.0 ttl pk-yrs)     Types: Cigarettes    Smokeless tobacco: Never    Tobacco comments:     6-7 cigarettes a day   Vaping Use    Vaping status: Every Day   Substance and Sexual Activity    Alcohol use: Not Currently     Social Drivers of Health     Financial Resource Strain: Not on File (7/20/2023)    Received from [email or web address]    Financial Resource Strain     Financial Resource Strain: 0   Food Insecurity: No Food Insecurity (12/15/2024)    Food Insecurity     Food Insecurity: Never true   Transportation Needs: No Transportation Needs (12/15/2024)    Transportation Needs     Lack of Transportation: No   Physical Activity: Not on File (7/20/2023)    Received from [email or web address]    Physical Activity     Physical Activity: 0   Stress: Not on File (7/20/2023)    Received from [email or web address]    Stress     Stress: 0   Social Connections: Not on File (9/10/2024)    Received from [email or web address]    Social Connections     Connectedness: 0   Housing Stability: Low Risk  (12/15/2024)     Housing Stability     Housing Instability: No     Family History:  History reviewed. No pertinent family history.  Allergies:  Allergies[1]  Current Meds:  Medications Ordered Prior to Encounter[2]      REVIEW OF SYSTEMS   Constitutional: no fatigue, normal energy,  no weight changes   HENT: no headache, normal sinuses and no mouth issues   Eyes: . normal vision no eye pain  Respiratory: normal respirations no cough   Cardiovascular: no chest pain  or palpitations   Gastrointestinal: History of melena  Genitourinary:  normal urination no hematuria, no frequency   Musculoskeletal: . No joint pain, no muscular pain  Skin: no rashes or skin lesions that are new   Neurological:  no weakness, no numbness, normal gait  Hematological:  no bruises or bleeding   Psychiatric/Behavioral: normal mood no anxiety normal behavior     /81 (BP Location: Right arm, Patient Position: Sitting, Cuff Size: adult)   Pulse 77   Temp 97.7 °F (36.5 °C) (Temporal)   Ht 5' 7\" (1.702 m)   Wt 227 lb (103 kg)   SpO2 99%   BMI 35.55 kg/m²     Lab Results   Component Value Date     12/16/2019    A1C 6.1 (H) 12/16/2019      Lab Results   Component Value Date    WBC 5.2 12/17/2024    RBC 4.49 12/17/2024    HGB 11.0 (L) 12/17/2024    HCT 36.0 (L) 12/17/2024    MCV 80.2 12/17/2024    MCH 24.5 (L) 12/17/2024    MCHC 30.6 (L) 12/17/2024    RDW 19.4 12/17/2024    .0 12/17/2024      Recent Results (from the past 4380 hours)   Comp Metabolic Panel (14)    Collection Time: 12/15/24  1:44 PM   Result Value Ref Range    Glucose 120 (H) 70 - 99 mg/dL    Sodium 141 136 - 145 mmol/L    Potassium 3.7 3.5 - 5.1 mmol/L    Chloride 108 98 - 112 mmol/L    CO2 28.0 21.0 - 32.0 mmol/L    Anion Gap 5 0 - 18 mmol/L    BUN 15 9 - 23 mg/dL    Creatinine 0.79 0.70 - 1.30 mg/dL    Calcium, Total 9.6 8.7 - 10.4 mg/dL    Calculated Osmolality 294 275 - 295 mOsm/kg    eGFR-Cr 99 >=60 mL/min/1.73m2    AST 29 <34 U/L    ALT 29 10 - 49 U/L    Alkaline  Phosphatase 64 45 - 117 U/L    Bilirubin, Total 0.4 0.2 - 1.1 mg/dL    Total Protein 6.6 5.7 - 8.2 g/dL    Albumin 4.0 3.2 - 4.8 g/dL    Globulin  2.6 2.0 - 3.5 g/dL    A/G Ratio 1.5 1.0 - 2.0      Lab Results   Component Value Date    CHOLEST 258.00 (H) 12/16/2019    TRIG 428.00 (H) 12/16/2019    HDL 68 12/16/2019    LDL  12/16/2019      Comment:      Unable to calculate LDL when TGL > 400.          PHYSICAL EXAM:   Constitutional: Vital signs reviewed as noted, well developed  Pleasant patient and appears their stated age  HEENT: NCAT  No palor, No icterus, No pedal edema, No lymphadenopathy  Nose: normal nose  Eyes: pupils reactive bilaterally  Neck: FROM  Cardiovascular: nl s1 s2 no murmur or gallop  Pulmonary/Chest: B/L equal air entry, vesicular breath sounds B/L, no wheezes or crackles  Abdominal: Soft NT , no palpable organomegaly, there is a surgical scar in the right lower quadrant.  Per patient, muscle grafting was done before his injured finger from that place.    Musculoskeletal:  normal ROM in UE and LE  Neurological:  no weakness in UE and LE  Skin: no rashes or bruises  Psychiatric:normal mood and behavior      ASSESSMENT AND PLAN:     1.  Upper GI bleed with recent hemoglobin more than 10 and currently asymptomatic.  Continue with pantoprazole.  Referral is made for gastroenterology for further evaluation including EGD.    2.  Hypertension: Still blood pressure is high.  Continue with current management.  Low-salt diet.  Target blood pressure is less than 130/80.    3.  Hyperlipidemia: Continue with atorvastatin.    4.  Class II obesity:  - Weight Loss Advice :  A) Eat plant based diet and avoid ultra processed and fast foods.  B) Your portions should be a dinner plate. 1/2 should be vegetables, 1/4 lean protein (chicken, fish, turkey. Tofu), and 1/4 can be carbohydrates.   C)  Your main drink should be water rather than soda or alcohol or sweet coffees  D). Please try to exercise ( brisk walking or  jogging) at least 30 minutes five times/week; and do muscle strengthening exercises ( lifting the weight, doing push ups or yoga)  twice a week    E). It is very important to get 7-9 hours of sleep per night    4.  Asymptomatic valvular heart disease including bicuspid aortic valve and nonrheumatic mitral regurgitation( mild to moderate)    5. Preventative care:   -Motivational interview was conducted against nicotine vaping.  -The patient will consider vaccination later        Patient/Caregiver Education: Patient/Caregiver Education: There are no barriers to learning. Medical education done. Outcome: Patient verbalizes understanding. Patient is notified to call with any questions, complications, allergies, or worsening or changing symptoms.  Patient is to call with any side effects or complications from the treatments as a result of today.    No follow-ups on file.    Reviewed Past Medical History and   Patient Active Problem List   Diagnosis    Gastrointestinal hemorrhage    Gastrointestinal hemorrhage, unspecified gastrointestinal hemorrhage type    Anemia, unspecified type    Renal insufficiency    Type 2 diabetes mellitus with obesity (HCC)    Accelerated hypertension    Mixed hyperlipidemia    History of GI bleed    Upper GI bleed       No orders of the defined types were placed in this encounter.    Requested Prescriptions      No prescriptions requested or ordered in this encounter         Osiel Rader MD         [1] No Known Allergies  [2]   Current Outpatient Medications on File Prior to Visit   Medication Sig Dispense Refill    pantoprazole 40 MG Oral Tab EC Take 1 tablet (40 mg total) by mouth 2 (two) times daily before meals. 60 tablet 1    Ferrous Sulfate (IRON) 325 (65 Fe) MG Oral Tab Take by mouth.      Losartan Potassium-HCTZ 100-25 MG Oral Tab Take 1 tablet by mouth daily. 90 tablet 1    atorvastatin 40 MG Oral Tab Take 1 tablet (40 mg total) by mouth nightly. 90 tablet 1     No current  facility-administered medications on file prior to visit.

## 2025-01-17 ENCOUNTER — LAB ENCOUNTER (OUTPATIENT)
Dept: LAB | Age: 65
End: 2025-01-17
Attending: INTERNAL MEDICINE
Payer: COMMERCIAL

## 2025-01-17 DIAGNOSIS — Z00.00 ANNUAL PHYSICAL EXAM: ICD-10-CM

## 2025-01-17 LAB
ALBUMIN SERPL-MCNC: 4.1 G/DL (ref 3.2–4.8)
ALBUMIN/GLOB SERPL: 1.5 {RATIO} (ref 1–2)
ALP LIVER SERPL-CCNC: 65 U/L
ALT SERPL-CCNC: 20 U/L
ANION GAP SERPL CALC-SCNC: 9 MMOL/L (ref 0–18)
AST SERPL-CCNC: 19 U/L (ref ?–34)
BASOPHILS # BLD AUTO: 0.07 X10(3) UL (ref 0–0.2)
BASOPHILS NFR BLD AUTO: 1.1 %
BILIRUB SERPL-MCNC: 0.4 MG/DL (ref 0.2–1.1)
BUN BLD-MCNC: 11 MG/DL (ref 9–23)
CALCIUM BLD-MCNC: 9.8 MG/DL (ref 8.7–10.6)
CHLORIDE SERPL-SCNC: 105 MMOL/L (ref 98–112)
CHOLEST SERPL-MCNC: 112 MG/DL (ref ?–200)
CO2 SERPL-SCNC: 28 MMOL/L (ref 21–32)
CREAT BLD-MCNC: 0.77 MG/DL
CREAT UR-SCNC: 164.9 MG/DL
EGFRCR SERPLBLD CKD-EPI 2021: 100 ML/MIN/1.73M2 (ref 60–?)
EOSINOPHIL # BLD AUTO: 0.31 X10(3) UL (ref 0–0.7)
EOSINOPHIL NFR BLD AUTO: 4.7 %
ERYTHROCYTE [DISTWIDTH] IN BLOOD BY AUTOMATED COUNT: 18.2 %
EST. AVERAGE GLUCOSE BLD GHB EST-MCNC: 117 MG/DL (ref 68–126)
FASTING PATIENT LIPID ANSWER: YES
FASTING STATUS PATIENT QL REPORTED: YES
GLOBULIN PLAS-MCNC: 2.7 G/DL (ref 2–3.5)
GLUCOSE BLD-MCNC: 111 MG/DL (ref 70–99)
HBA1C MFR BLD: 5.7 % (ref ?–5.7)
HCT VFR BLD AUTO: 31 %
HDLC SERPL-MCNC: 45 MG/DL (ref 40–59)
HGB BLD-MCNC: 9.5 G/DL
IMM GRANULOCYTES # BLD AUTO: 0.06 X10(3) UL (ref 0–1)
IMM GRANULOCYTES NFR BLD: 0.9 %
LDLC SERPL CALC-MCNC: 45 MG/DL (ref ?–100)
LYMPHOCYTES # BLD AUTO: 1.9 X10(3) UL (ref 1–4)
LYMPHOCYTES NFR BLD AUTO: 28.7 %
MCH RBC QN AUTO: 25.3 PG (ref 26–34)
MCHC RBC AUTO-ENTMCNC: 30.6 G/DL (ref 31–37)
MCV RBC AUTO: 82.4 FL
MICROALBUMIN UR-MCNC: 10.9 MG/DL
MICROALBUMIN/CREAT 24H UR-RTO: 66.1 UG/MG (ref ?–30)
MONOCYTES # BLD AUTO: 0.69 X10(3) UL (ref 0.1–1)
MONOCYTES NFR BLD AUTO: 10.4 %
NEUTROPHILS # BLD AUTO: 3.59 X10 (3) UL (ref 1.5–7.7)
NEUTROPHILS # BLD AUTO: 3.59 X10(3) UL (ref 1.5–7.7)
NEUTROPHILS NFR BLD AUTO: 54.2 %
NONHDLC SERPL-MCNC: 67 MG/DL (ref ?–130)
OSMOLALITY SERPL CALC.SUM OF ELEC: 294 MOSM/KG (ref 275–295)
PLATELET # BLD AUTO: 161 10(3)UL (ref 150–450)
PLATELETS.RETICULATED NFR BLD AUTO: 9.8 % (ref 0–7)
POTASSIUM SERPL-SCNC: 3.8 MMOL/L (ref 3.5–5.1)
PROT SERPL-MCNC: 6.8 G/DL (ref 5.7–8.2)
RBC # BLD AUTO: 3.76 X10(6)UL
SODIUM SERPL-SCNC: 142 MMOL/L (ref 136–145)
TRIGL SERPL-MCNC: 122 MG/DL (ref 30–149)
TSI SER-ACNC: 0.7 UIU/ML (ref 0.55–4.78)
VLDLC SERPL CALC-MCNC: 17 MG/DL (ref 0–30)
WBC # BLD AUTO: 6.6 X10(3) UL (ref 4–11)

## 2025-01-17 PROCEDURE — 82570 ASSAY OF URINE CREATININE: CPT

## 2025-01-17 PROCEDURE — 85025 COMPLETE CBC W/AUTO DIFF WBC: CPT

## 2025-01-17 PROCEDURE — 80061 LIPID PANEL: CPT

## 2025-01-17 PROCEDURE — 83036 HEMOGLOBIN GLYCOSYLATED A1C: CPT

## 2025-01-17 PROCEDURE — 36415 COLL VENOUS BLD VENIPUNCTURE: CPT

## 2025-01-17 PROCEDURE — 80053 COMPREHEN METABOLIC PANEL: CPT

## 2025-01-17 PROCEDURE — 82043 UR ALBUMIN QUANTITATIVE: CPT

## 2025-01-17 PROCEDURE — 84443 ASSAY THYROID STIM HORMONE: CPT

## 2025-01-20 ENCOUNTER — HOSPITAL ENCOUNTER (OUTPATIENT)
Facility: HOSPITAL | Age: 65
Setting detail: OBSERVATION
Discharge: HOME | End: 2025-01-22
Attending: EMERGENCY MEDICINE | Admitting: HOSPITALIST
Payer: COMMERCIAL

## 2025-01-20 DIAGNOSIS — D64.9 ANEMIA, UNSPECIFIED TYPE: ICD-10-CM

## 2025-01-20 DIAGNOSIS — K92.1 MELENA: Primary | ICD-10-CM

## 2025-01-20 LAB
ALBUMIN SERPL-MCNC: 4.5 G/DL (ref 3.2–4.8)
ALBUMIN/GLOB SERPL: 1.9 {RATIO} (ref 1–2)
ALP LIVER SERPL-CCNC: 67 U/L
ALT SERPL-CCNC: 21 U/L
ANION GAP SERPL CALC-SCNC: 9 MMOL/L (ref 0–18)
ANTIBODY SCREEN: NEGATIVE
APTT PPP: 34.6 SECONDS (ref 23–36)
AST SERPL-CCNC: 23 U/L (ref ?–34)
BASOPHILS # BLD AUTO: 0.09 X10(3) UL (ref 0–0.2)
BASOPHILS NFR BLD AUTO: 1.4 %
BILIRUB SERPL-MCNC: 0.5 MG/DL (ref 0.2–1.1)
BUN BLD-MCNC: 13 MG/DL (ref 9–23)
CALCIUM BLD-MCNC: 9.5 MG/DL (ref 8.7–10.6)
CHLORIDE SERPL-SCNC: 107 MMOL/L (ref 98–112)
CO2 SERPL-SCNC: 26 MMOL/L (ref 21–32)
CREAT BLD-MCNC: 0.87 MG/DL
EGFRCR SERPLBLD CKD-EPI 2021: 96 ML/MIN/1.73M2 (ref 60–?)
EOSINOPHIL # BLD AUTO: 0.23 X10(3) UL (ref 0–0.7)
EOSINOPHIL NFR BLD AUTO: 3.5 %
ERYTHROCYTE [DISTWIDTH] IN BLOOD BY AUTOMATED COUNT: 19.5 %
GLOBULIN PLAS-MCNC: 2.4 G/DL (ref 2–3.5)
GLUCOSE BLD-MCNC: 102 MG/DL (ref 70–99)
HCT VFR BLD AUTO: 27.7 %
HGB BLD-MCNC: 8.9 G/DL
IMM GRANULOCYTES # BLD AUTO: 0.08 X10(3) UL (ref 0–1)
IMM GRANULOCYTES NFR BLD: 1.2 %
INR BLD: 1.25 (ref 0.8–1.2)
LYMPHOCYTES # BLD AUTO: 1.53 X10(3) UL (ref 1–4)
LYMPHOCYTES NFR BLD AUTO: 23.4 %
MCH RBC QN AUTO: 25.9 PG (ref 26–34)
MCHC RBC AUTO-ENTMCNC: 32.1 G/DL (ref 31–37)
MCV RBC AUTO: 80.5 FL
MONOCYTES # BLD AUTO: 0.91 X10(3) UL (ref 0.1–1)
MONOCYTES NFR BLD AUTO: 13.9 %
NEUTROPHILS # BLD AUTO: 3.69 X10 (3) UL (ref 1.5–7.7)
NEUTROPHILS # BLD AUTO: 3.69 X10(3) UL (ref 1.5–7.7)
NEUTROPHILS NFR BLD AUTO: 56.6 %
OSMOLALITY SERPL CALC.SUM OF ELEC: 294 MOSM/KG (ref 275–295)
PLATELET # BLD AUTO: 147 10(3)UL (ref 150–450)
POTASSIUM SERPL-SCNC: 3.7 MMOL/L (ref 3.5–5.1)
PROT SERPL-MCNC: 6.9 G/DL (ref 5.7–8.2)
PROTHROMBIN TIME: 15.8 SECONDS (ref 11.6–14.8)
RBC # BLD AUTO: 3.44 X10(6)UL
RH BLOOD TYPE: POSITIVE
SODIUM SERPL-SCNC: 142 MMOL/L (ref 136–145)
WBC # BLD AUTO: 6.5 X10(3) UL (ref 4–11)

## 2025-01-20 RX ORDER — PANTOPRAZOLE SODIUM 40 MG/1
40 TABLET, DELAYED RELEASE ORAL
Status: DISCONTINUED | OUTPATIENT
Start: 2025-01-21 | End: 2025-01-21

## 2025-01-20 RX ORDER — SODIUM CHLORIDE 9 MG/ML
INJECTION, SOLUTION INTRAVENOUS CONTINUOUS
Status: DISCONTINUED | OUTPATIENT
Start: 2025-01-20 | End: 2025-01-22

## 2025-01-20 RX ORDER — ATORVASTATIN CALCIUM 40 MG/1
40 TABLET, FILM COATED ORAL NIGHTLY
Status: DISCONTINUED | OUTPATIENT
Start: 2025-01-20 | End: 2025-01-22

## 2025-01-20 RX ORDER — SODIUM PHOSPHATE, DIBASIC AND SODIUM PHOSPHATE, MONOBASIC 7; 19 G/230ML; G/230ML
1 ENEMA RECTAL ONCE AS NEEDED
Status: DISCONTINUED | OUTPATIENT
Start: 2025-01-20 | End: 2025-01-22

## 2025-01-20 RX ORDER — SENNOSIDES 8.6 MG
17.2 TABLET ORAL NIGHTLY PRN
Status: DISCONTINUED | OUTPATIENT
Start: 2025-01-20 | End: 2025-01-22

## 2025-01-20 RX ORDER — POTASSIUM CHLORIDE 1500 MG/1
40 TABLET, EXTENDED RELEASE ORAL ONCE
Status: COMPLETED | OUTPATIENT
Start: 2025-01-20 | End: 2025-01-20

## 2025-01-20 RX ORDER — ACETAMINOPHEN 500 MG
500 TABLET ORAL EVERY 4 HOURS PRN
Status: DISCONTINUED | OUTPATIENT
Start: 2025-01-20 | End: 2025-01-22

## 2025-01-20 RX ORDER — ONDANSETRON 2 MG/ML
4 INJECTION INTRAMUSCULAR; INTRAVENOUS EVERY 6 HOURS PRN
Status: DISCONTINUED | OUTPATIENT
Start: 2025-01-20 | End: 2025-01-22

## 2025-01-20 RX ORDER — BISACODYL 10 MG
10 SUPPOSITORY, RECTAL RECTAL
Status: DISCONTINUED | OUTPATIENT
Start: 2025-01-20 | End: 2025-01-22

## 2025-01-20 RX ORDER — POLYETHYLENE GLYCOL 3350 17 G/17G
17 POWDER, FOR SOLUTION ORAL DAILY PRN
Status: DISCONTINUED | OUTPATIENT
Start: 2025-01-20 | End: 2025-01-22

## 2025-01-20 RX ORDER — PROCHLORPERAZINE EDISYLATE 5 MG/ML
5 INJECTION INTRAMUSCULAR; INTRAVENOUS EVERY 8 HOURS PRN
Status: DISCONTINUED | OUTPATIENT
Start: 2025-01-20 | End: 2025-01-22

## 2025-01-20 NOTE — ED INITIAL ASSESSMENT (HPI)
Patient reports dark stools x 1 week, admitted her 5 weeks ago for same. Complaining of some weakness and SOB. Denies pain.

## 2025-01-21 ENCOUNTER — ANESTHESIA (OUTPATIENT)
Dept: ENDOSCOPY | Facility: HOSPITAL | Age: 65
End: 2025-01-21
Payer: COMMERCIAL

## 2025-01-21 ENCOUNTER — ANESTHESIA EVENT (OUTPATIENT)
Dept: ENDOSCOPY | Facility: HOSPITAL | Age: 65
End: 2025-01-21
Payer: COMMERCIAL

## 2025-01-21 LAB
BASOPHILS # BLD AUTO: 0.07 X10(3) UL (ref 0–0.2)
BASOPHILS NFR BLD AUTO: 1.1 %
EOSINOPHIL # BLD AUTO: 0.25 X10(3) UL (ref 0–0.7)
EOSINOPHIL NFR BLD AUTO: 4 %
ERYTHROCYTE [DISTWIDTH] IN BLOOD BY AUTOMATED COUNT: 19.8 %
ERYTHROCYTE [DISTWIDTH] IN BLOOD BY AUTOMATED COUNT: 19.9 %
HCT VFR BLD AUTO: 25.4 %
HCT VFR BLD AUTO: 26.5 %
HGB BLD-MCNC: 7.7 G/DL
HGB BLD-MCNC: 8 G/DL
HGB BLD-MCNC: 8.1 G/DL
IMM GRANULOCYTES # BLD AUTO: 0.04 X10(3) UL (ref 0–1)
IMM GRANULOCYTES NFR BLD: 0.6 %
LYMPHOCYTES # BLD AUTO: 1.86 X10(3) UL (ref 1–4)
LYMPHOCYTES NFR BLD AUTO: 29.8 %
MCH RBC QN AUTO: 25.3 PG (ref 26–34)
MCH RBC QN AUTO: 25.5 PG (ref 26–34)
MCHC RBC AUTO-ENTMCNC: 30.3 G/DL (ref 31–37)
MCHC RBC AUTO-ENTMCNC: 30.6 G/DL (ref 31–37)
MCV RBC AUTO: 82.8 FL
MCV RBC AUTO: 84.1 FL
MONOCYTES # BLD AUTO: 0.67 X10(3) UL (ref 0.1–1)
MONOCYTES NFR BLD AUTO: 10.7 %
NEUTROPHILS # BLD AUTO: 3.35 X10 (3) UL (ref 1.5–7.7)
NEUTROPHILS # BLD AUTO: 3.35 X10(3) UL (ref 1.5–7.7)
NEUTROPHILS NFR BLD AUTO: 53.8 %
PLATELET # BLD AUTO: 145 10(3)UL (ref 150–450)
PLATELET # BLD AUTO: 162 10(3)UL (ref 150–450)
POTASSIUM SERPL-SCNC: 4.1 MMOL/L (ref 3.5–5.1)
RBC # BLD AUTO: 3.02 X10(6)UL
RBC # BLD AUTO: 3.2 X10(6)UL
WBC # BLD AUTO: 6.2 X10(3) UL (ref 4–11)
WBC # BLD AUTO: 6.6 X10(3) UL (ref 4–11)

## 2025-01-21 PROCEDURE — 99233 SBSQ HOSP IP/OBS HIGH 50: CPT | Performed by: HOSPITALIST

## 2025-01-21 PROCEDURE — 0DJ08ZZ INSPECTION OF UPPER INTESTINAL TRACT, VIA NATURAL OR ARTIFICIAL OPENING ENDOSCOPIC: ICD-10-PCS | Performed by: INTERNAL MEDICINE

## 2025-01-21 RX ORDER — NALOXONE HYDROCHLORIDE 0.4 MG/ML
0.08 INJECTION, SOLUTION INTRAMUSCULAR; INTRAVENOUS; SUBCUTANEOUS ONCE AS NEEDED
Status: DISCONTINUED | OUTPATIENT
Start: 2025-01-21 | End: 2025-01-21 | Stop reason: HOSPADM

## 2025-01-21 RX ORDER — ONDANSETRON 2 MG/ML
4 INJECTION INTRAMUSCULAR; INTRAVENOUS ONCE AS NEEDED
Status: DISCONTINUED | OUTPATIENT
Start: 2025-01-21 | End: 2025-01-21 | Stop reason: HOSPADM

## 2025-01-21 RX ORDER — SODIUM CHLORIDE, SODIUM LACTATE, POTASSIUM CHLORIDE, CALCIUM CHLORIDE 600; 310; 30; 20 MG/100ML; MG/100ML; MG/100ML; MG/100ML
INJECTION, SOLUTION INTRAVENOUS CONTINUOUS
Status: DISCONTINUED | OUTPATIENT
Start: 2025-01-21 | End: 2025-01-22

## 2025-01-21 RX ORDER — LIDOCAINE HYDROCHLORIDE 10 MG/ML
INJECTION, SOLUTION EPIDURAL; INFILTRATION; INTRACAUDAL; PERINEURAL AS NEEDED
Status: DISCONTINUED | OUTPATIENT
Start: 2025-01-21 | End: 2025-01-21 | Stop reason: SURG

## 2025-01-21 RX ORDER — SODIUM CHLORIDE, SODIUM LACTATE, POTASSIUM CHLORIDE, CALCIUM CHLORIDE 600; 310; 30; 20 MG/100ML; MG/100ML; MG/100ML; MG/100ML
INJECTION, SOLUTION INTRAVENOUS CONTINUOUS PRN
Status: DISCONTINUED | OUTPATIENT
Start: 2025-01-21 | End: 2025-01-21 | Stop reason: SURG

## 2025-01-21 RX ADMIN — SODIUM CHLORIDE, SODIUM LACTATE, POTASSIUM CHLORIDE, CALCIUM CHLORIDE: 600; 310; 30; 20 INJECTION, SOLUTION INTRAVENOUS at 16:40:00

## 2025-01-21 RX ADMIN — LIDOCAINE HYDROCHLORIDE 30 MG: 10 INJECTION, SOLUTION EPIDURAL; INFILTRATION; INTRACAUDAL; PERINEURAL at 16:43:00

## 2025-01-21 NOTE — OPERATIVE REPORT
EGD operative report  Patient Name: Moises Olsen  Procedure: Esophagogastroduodenoscopy   Date of procedure: 1/21/2025    Pre-operative Indication: Melena, acute blood loss anemia  Post-operative findings: Normal EGD  Attending: Geoffrey Milan M.D.  Consent:  The risks, benefits, and alternatives were discussed with the patient / POA.  Risks included, but were not limited to, bleeding, perforation, medication effects, cardiac arrhythmias, and aspiration.  After all questions were answered to their satisfaction, a signed, informed, and witnessed consent was obtained.  Timeout:  Prior to initiation of sedation, a formal timeout was performed, confirming the patient's name, date of birth, allergies, correct procedure, and need for antibiotics.  The operating physician and sedating physician was also confirmed prior to initiation of sedation.     Sedation: Monitored Anesthesia Care.  Monitoring:  Pulsoximetry, pulse, respirations, and blood pressure were monitored throughout the entire procedure  Procedure: After achieving adequate sedation and placing the patient in the left lateral decubitus position, the lubricated upper endoscope was introduced into the mouth and advanced to the descending duodenum.  The endoscope was then withdrawn into the gastric antrum and placed in a retroflexed position.  The endoscope was then righted, and air was suctioned from the stomach.  The endoscope was then withdrawn from the patient, with careful visual inspection of the mucosa revealing no additional pathologic findings.  The patient tolerated the procedure without apparent procedural complications.  The patient left the procedure room in stable condition for recovery.  Findings: Esophagus: The mucosa was normal, without masses, polyps, ulcers, erosions, diverticula, or varices.  The squamocolumnar junction / esophagogastric junction / diaphragmatic impression were appreciated at 40 cm from the incisors.       Stomach:  The  gastric body, antrum, fundus, cardia, and angularis were normal, without masses, polyps, ulcers, erosions, diverticula, or varices.     Duodenum: The duodenal bulb, post-bulbar duodenum, and descending duodenum were normal, without masses, polyps, ulcers, erosions, diverticula, or varices.    Impression: Findings as above do not explain this patient's anemia or melena.  Recommendations: Pursue colonoscopy tomorrow

## 2025-01-21 NOTE — PROGRESS NOTES
[facility]   part of [facility]     Hospitalist Progress Note     Moises Olsen Patient Status:  Observation    7/15/1960 MRN [id number]   Location [facility] 8NE-A Attending Familia Arias MD   Hosp Day # 0 PCP Osiel Rader MD     Chief Complaint: GIB    Subjective:   Patient denies nausea or vomiting. Having dark stools.     Current medications:   pantoprazole  40 mg Intravenous Q12H    atorvastatin  40 mg Oral Nightly       Objective:    Review of Systems:   10 point ROS completed and was negative, except for pertinent positive and negatives stated in subjective.    Vital signs:  Temp:  [97.9 °F (36.6 °C)-98.5 °F (36.9 °C)] 98 °F (36.7 °C)  Pulse:  [71-93] 71  Resp:  [16-19] 18  BP: ()/(57-70) 125/61  SpO2:  [91 %-100 %] 93 %  Patient Weight for the past 72 hrs:   Weight   25 1727 227 lb 1.2 oz (103 kg)   25 2212 222 lb 10.6 oz (101 kg)     Physical Exam:    General: No acute distress.   Respiratory: Clear to auscultation bilaterally.   Cardiovascular: S1, S2. Regular rate and rhythm.   Abdomen: Soft, nontender, nondistended.  Positive bowel sounds.   Extremities: No edema.  Neuro: AAOx3    Diagnostic Data:    Labs:  Recent Labs   Lab 25  1633 25  1734 25  2341 25  0604   WBC 6.6 6.5 6.6 6.2   HGB 9.5* 8.9* 8.1* 7.7*   MCV 82.4 80.5 82.8 84.1   .0 147.0* 162.0 145.0*   INR  --  1.25*  --   --        Recent Labs   Lab 25  1633 25  1734 25  0543   * 102*  --    BUN 11 13  --    CREATSERUM 0.77 0.87  --    CA 9.8 9.5  --    ALB 4.1 4.5  --     142  --    K 3.8 3.7 4.1    107  --    CO2 28.0 26.0  --    ALKPHO 65 67  --    AST 19 23  --    ALT 20 21  --    BILT 0.4 0.5  --    TP 6.8 6.9  --        Estimated Creatinine Clearance: 80.2 mL/min (based on SCr of 0.87 mg/dL).    Recent Labs   Lab 25  0234   PTP 15.8*   INR 1.25*            COVID-19 Lab Results    COVID-19  Lab Results   Component Value Date     COVID19 Detected (A) 08/27/2020       Pro-Calcitonin  No results for input(s): \"PCT\" in the last 168 hours.    Cardiac  No results for input(s): \"TROP\", \"PBNP\" in the last 168 hours.    Creatinine Kinase  No results for input(s): \"CK\" in the last 168 hours.    Inflammatory Markers  No results for input(s): \"CRP\", \"MAYRA\", \"LDH\", \"DDIMER\" in the last 168 hours.    No results for input(s): \"TROP\", \"TROPHS\", \"CK\" in the last 168 hours.    Imaging: Imaging data reviewed in Epic.    Medications:    pantoprazole  40 mg Intravenous Q12H    atorvastatin  40 mg Oral Nightly       Assessment & Plan:    Hypotension d/t hypovolemia d/t anemia d/t GIB  Melena d/t UGIB  Recent UGIB d/t duodenal ulcer 12/2024 d/t H pylori and nicotine dependence  Anemia, acute blood loss and iron deficiency   Essential hypertension on Losartan-hydrochlorothiazide   Dyslipidemia on Lipitor   Prediabetes, A1c 5.7    Plan:  NPO  IVF  PPI IV BID  Hold antihypertensives  Monitor hemoglobin   Endoscopic evaluation  Will need to start treatment for Hpylori   GI consult - case discussed     DVT Px: SCDs    At this point Mr. Olsen is expected to be discharge to: Home    Plan of care discussed with patient and GI.    Familia Arias MD        Supplementary Documentation:   DVT Mechanical Prophylaxis:   SCDs, Early ambuation  DVT Pharmacologic Prophylaxis   Medication   None                Code Status: Not on file  Sutton: No urinary catheter in place  Sutton Duration (in days):   Central line:    CA:

## 2025-01-21 NOTE — ED QUICK NOTES
Orders for admission, patient is aware of plan and ready to go upstairs. Any questions, please call ED RN Cyndie at extension [phone].     Patient Covid vaccination status: Fully vaccinated     COVID Test Ordered in ED: None    COVID Suspicion at Admission: N/A    Running Infusions:  None    Mental Status/LOC at time of transport: A&O x4    Other pertinent information:   CIWA score: N/A   NIH score:  N/A

## 2025-01-21 NOTE — PLAN OF CARE
Assumed care of patient at 0700. Alert and oriented x4. Maintains o2 on RA. Lung sounds clear. NSR on tele monitor. No complaints of pain at this time. NPO for EGD procedure. Continent of bowel and bladder. Melena stools x5 days. Up ad lemuel in room. Personal possession and call light within reach. Bed locked, in lowest position. Updated on plan of care.    Problem: Patient/Family Goals  Goal: Patient/Family Long Term Goal  Description: Patient's Long Term Goal: Stay out of hospital    Interventions:  - Follow up with PCP after discharge  -Take medication as prescribed  - See additional Care Plan goals for specific interventions  Outcome: Progressing  Goal: Patient/Family Short Term Goal  Description: Patient's Short Term Goal: Go home    Interventions:   - Test ordered  - Monitor on tele  - Monitor labs  - See additional Care Plan goals for specific interventions  Outcome: Progressing     Problem: HEMATOLOGIC - ADULT  Goal: Maintains hematologic stability  Description: INTERVENTIONS  - Assess for signs and symptoms of bleeding or hemorrhage  - Monitor labs and vital signs for trends  - Administer supportive blood products/factors, fluids and medications as ordered and appropriate  - Administer supportive blood products/factors as ordered and appropriate  Outcome: Progressing  Goal: Free from bleeding injury  Description: (Example usage: patient with low platelets)  INTERVENTIONS:  - Avoid intramuscular injections, enemas and rectal medication administration  - Ensure safe mobilization of patient  - Hold pressure on venipuncture sites to achieve adequate hemostasis  - Assess for signs and symptoms of internal bleeding  - Monitor lab trends  - Patient is to report abnormal signs of bleeding to staff  - Avoid use of toothpicks and dental floss  - Use electric shaver for shaving  - Use soft bristle tooth brush  - Limit straining and forceful nose blowing  Outcome: Progressing

## 2025-01-21 NOTE — H&P
[facility]     Moises Olsen Patient Status:  Emergency    7/15/1960 MRN [id number]   Location [facility] EMERGENCY DEPARTMENT Attending Kateryna Pulido DO   Hosp Day # 0 PCP Osiel Rader MD     Chief Complaint: Dark stool for a week complaining of generalized weakness and shortness of breath    Subjective:    History of Present Illness:     Moises Olsen is a 64 year old male with history of diabetes type 2 peptic ulcer disease hypertension iron deficiency anemia who presents with melena.  Patient was admitted in December with upper GI bleeding and he was found to have duodenal ulcers.He was called about h oxana positive didn't answer the phone.He denies taking nsaids.    History/Other:    Past Medical History:  Past Medical History:    Anemia    Diabetes (HCC)    Essential hypertension    GI bleed    History of blood transfusion    Hyperlipidemia     Past Surgical History:   History reviewed. No pertinent surgical history.   Family History:   No family history on file.  Social History:    reports that he has quit smoking. His smoking use included cigarettes. He has a 15 pack-year smoking history. He has never used smokeless tobacco. He reports that he does not currently use alcohol.     Allergies: Allergies[1]    Medications:  Medications Ordered Prior to Encounter[2]    Review of Systems:   A comprehensive review of systems was completed.    Pertinent positives and negatives noted in the HPI.    Objective:   Physical Exam:    /67   Pulse 78   Temp 97.9 °F (36.6 °C) (Temporal)   Resp 16   Ht 5' 7\" (1.702 m)   Wt 227 lb 1.2 oz (103 kg)   SpO2 100%   BMI 35.56 kg/m²   General: No acute distress, Alert  Respiratory: No rhonchi, no wheezes  Cardiovascular: S1, S2. Regular rate and rhythm  Abdomen: Soft, Non-tender, non-distended, positive bowel sounds  Neuro: No new focal deficits  Extremities: No edema      Results:    Labs:      Labs Last 24 Hours:    Recent Labs    Lab 01/17/25  1633 01/20/25  1734   RBC 3.76* 3.44*   HGB 9.5* 8.9*   HCT 31.0* 27.7*   MCV 82.4 80.5   MCH 25.3* 25.9*   MCHC 30.6* 32.1   RDW 18.2 19.5   NEPRELIM 3.59 3.69   WBC 6.6 6.5   .0 147.0*       Recent Labs   Lab 01/17/25  1633 01/20/25  1734   * 102*   BUN 11 13   CREATSERUM 0.77 0.87   EGFRCR 100 96   CA 9.8 9.5   ALB 4.1 4.5    142   K 3.8 3.7    107   CO2 28.0 26.0   ALKPHO 65 67   AST 19 23   ALT 20 21   BILT 0.4 0.5   TP 6.8 6.9       Estimated Glomerular Filtration Rate: 96.4 mL/min/1.73m2 (by CKD-EPI based on SCr of 0.87 mg/dL).    Lab Results   Component Value Date    INR 1.25 (H) 01/20/2025    INR 1.25 (H) 12/15/2024       No results for input(s): \"TROP\", \"TROPHS\", \"CK\" in the last 168 hours.    No results for input(s): \"TROP\", \"PBNP\" in the last 168 hours.    No results for input(s): \"PCT\" in the last 168 hours.    Imaging: Imaging data reviewed in Epic.    Assessment & Plan:      #Upper GI bleed  -recheck hb  -hx of Duodenal ulcers  -Hx of H pylori  #Iron deficiency anemia  #Prediabetes with A1c 6.0  #Hypertension stable  #Hyperlipidemia        Plan of care discussed with patient and ED physician    Lili Morrissey MD    Supplementary Documentation:     The 21st Century Cures Act makes medical notes like these available to patients in the interest of transparency. Please be advised this is a medical document. Medical documents are intended to carry relevant information, facts as evident, and the clinical opinion of the practitioner. The medical note is intended as peer to peer communication and may appear blunt or direct. It is written in medical language and may contain abbreviations or verbiage that are unfamiliar.                                       [1] No Known Allergies  [2]   Current Facility-Administered Medications on File Prior to Encounter   Medication Dose Route Frequency Provider Last Rate Last Admin    [COMPLETED] sodium chloride 0.9 % IV bolus 1,000 mL   1,000 mL Intravenous Once Brown Wilkes MD   Stopped at 12/15/24 1402    [COMPLETED] pantoprazole (Protonix) 40 mg in sodium chloride 0.9% PF 10 mL IV push  40 mg Intravenous Once Brown Wilkes MD   40 mg at 12/15/24 1335    [] sodium chloride 0.9% infusion   Intravenous Continuous lFy Bingham DO   Stopped at 24    [COMPLETED] sodium ferric gluconate (Ferrlecit) 125 mg in sodium chloride 0.9% 100mL IVPB premix  125 mg Intravenous Daily Fly Bingham DO   Stopped at 24 1100     Current Outpatient Medications on File Prior to Encounter   Medication Sig Dispense Refill    pantoprazole 40 MG Oral Tab EC Take 1 tablet (40 mg total) by mouth 2 (two) times daily before meals. 60 tablet 1    Ferrous Sulfate (IRON) 325 (65 Fe) MG Oral Tab Take by mouth.      Losartan Potassium-HCTZ 100-25 MG Oral Tab Take 1 tablet by mouth daily. 90 tablet 1    atorvastatin 40 MG Oral Tab Take 1 tablet (40 mg total) by mouth nightly. 90 tablet 1    [] sucralfate 1 GM/10ML Oral Suspension Take 10 mL (1 g total) by mouth 4 (four) times daily before meals and nightly (2200) for 10 days. 414 mL 0

## 2025-01-21 NOTE — ANESTHESIA POSTPROCEDURE EVALUATION
[facility]    Moises Olsen Patient Status:  Observation   Age/Gender 64 year old male MRN [id number]   Location [facility] ENDOSCOPY PAIN CENTER Attending Familia Arias MD   Hosp Day # 0 PCP Osiel Rader MD       Anesthesia Post-op Note    ESOPHAGOGASTRODUODENOSCOPY (EGD)    Procedure Summary       Date: 01/21/25 Room / Location:  ENDOSCOPY 03 /  ENDOSCOPY    Anesthesia Start: 1640 Anesthesia Stop: 1655    Procedure: ESOPHAGOGASTRODUODENOSCOPY (EGD) Diagnosis: (NORMAL EGD EXAM)    Surgeons: Geoffrey Milan MD Anesthesiologist: Tono Renteria MD    Anesthesia Type: MAC ASA Status: 3            Anesthesia Type: MAC    Vitals Value Taken Time   BP 94/54 01/21/25 1655   Temp  01/21/25 1655   Pulse 66 01/21/25 1655   Resp 16 01/21/25 1655   SpO2 94 % 01/21/25 1655   Vitals shown include unfiled device data.        Patient Location: Endoscopy    Anesthesia Type: MAC    Airway Patency: patent    Postop Pain Control: adequate    Mental Status: mildly sedated but able to meaningfully participate in the post-anesthesia evaluation    Nausea/Vomiting: none    Cardiopulmonary/Hydration status: stable euvolemic    Complications: no apparent anesthesia related complications    Postop vital signs: stable    Dental Exam: Unchanged from Postop

## 2025-01-21 NOTE — ED PROVIDER NOTES
Patient Seen in: [facility] Emergency Department      History     Chief Complaint   Patient presents with    Blood In Stool     Stated Complaint: Black Stools \"I'm here for the same reason I was here 5 weeks ago\"    Subjective:   HPI  Patient is a 63 yo M with a history of PUD, DM, HTN, HLD who presents to ED for evaluation of dark stool over past week. Pt reports initially started as dark brown but has progressed to black stool. He has has 1-2 episodes daily. No vomiting, abd pain, or other bleeding. Pt does have some mild SOB and weakness.     From chart review, pt recently admitted last month for melena found to have duodenal ulcer. Started on PPI and carafate. Pt reports that he never followed up with GI because he was having difficulty and had to establish care with a PCP first. He saw PCP last week. On 12/21, GI note in pt's chart planned for visit f/u and notified pt that he has H. Pylori gastritis with plan for office visit with instructions to treat infection. Pt is unsure if he ever started medications for this.     Objective:     Past Medical History:    Anemia    Diabetes (HCC)    Essential hypertension    GI bleed    History of blood transfusion    Hyperlipidemia              History reviewed. No pertinent surgical history.             Social History     Socioeconomic History    Marital status: Single   Tobacco Use    Smoking status: Former     Current packs/day: 0.50     Average packs/day: 0.5 packs/day for 30.0 years (15.0 ttl pk-yrs)     Types: Cigarettes    Smokeless tobacco: Never    Tobacco comments:     6-7 cigarettes a day   Vaping Use    Vaping status: Every Day   Substance and Sexual Activity    Alcohol use: Not Currently     Social Drivers of Health     Financial Resource Strain: Not on File (7/20/2023)    Received from [employer]    Financial Resource Strain     Financial Resource Strain: 0   Food Insecurity: No Food Insecurity (1/20/2025)    Food Insecurity     Food Insecurity: Never true    Transportation Needs: No Transportation Needs (1/20/2025)    Transportation Needs     Lack of Transportation: No   Physical Activity: Not on File (7/20/2023)    Received from [employer]    Physical Activity     Physical Activity: 0   Stress: Not on File (7/20/2023)    Received from [employer]    Stress     Stress: 0   Social Connections: Not on File (9/10/2024)    Received from [employer]    Social Connections     Connectedness: 0   Housing Stability: Low Risk  (1/20/2025)    Housing Stability     Housing Instability: No                  Physical Exam     ED Triage Vitals [01/20/25 1727]   /67   Pulse 91   Resp 18   Temp 97.9 °F (36.6 °C)   Temp src Temporal   SpO2 98 %   O2 Device None (Room air)       Current Vitals:   Vital Signs  BP: 114/67  Pulse: 73  Resp: 16  Temp: 98.2 °F (36.8 °C)  Temp src: Oral  MAP (mmHg): 81    Oxygen Therapy  SpO2: 96 %  O2 Device: None (Room air)  O2 Flow Rate (L/min): 0 L/min  Pulse Oximetry Type: Intermittent  Oximetry Probe Site Changed: No  Pulse Ox Probe Location: Left hand        Physical Exam  Vitals and nursing note reviewed.   Constitutional:       General: He is not in acute distress.  HENT:      Head: Normocephalic and atraumatic.      Nose: Nose normal.      Mouth/Throat:      Mouth: Mucous membranes are moist.   Eyes:      Extraocular Movements: Extraocular movements intact.      Pupils: Pupils are equal, round, and reactive to light.   Cardiovascular:      Rate and Rhythm: Normal rate and regular rhythm.      Pulses: Normal pulses.   Pulmonary:      Effort: Pulmonary effort is normal. No respiratory distress.      Breath sounds: No wheezing.   Abdominal:      General: There is no distension.      Palpations: Abdomen is soft.   Genitourinary:     Comments: Hemoccult pos, black stool  Musculoskeletal:         General: No swelling. Normal range of motion.      Cervical back: Normal range of motion.   Skin:     General: Skin is warm and dry.      Capillary Refill: Capillary refill  takes less than 2 seconds.   Neurological:      General: No focal deficit present.      Mental Status: He is alert.   Psychiatric:         Mood and Affect: Mood normal.             ED Course     Labs Reviewed   CBC WITH DIFFERENTIAL WITH PLATELET - Abnormal; Notable for the following components:       Result Value    RBC 3.44 (*)     HGB 8.9 (*)     HCT 27.7 (*)     .0 (*)     MCH 25.9 (*)     All other components within normal limits   COMP METABOLIC PANEL (14) - Abnormal; Notable for the following components:    Glucose 102 (*)     All other components within normal limits   PROTHROMBIN TIME (PT) - Abnormal; Notable for the following components:    PT 15.8 (*)     INR 1.25 (*)     All other components within normal limits   CBC, PLATELET; NO DIFFERENTIAL - Abnormal; Notable for the following components:    RBC 3.20 (*)     HGB 8.1 (*)     HCT 26.5 (*)     MCH 25.3 (*)     MCHC 30.6 (*)     All other components within normal limits   PTT, ACTIVATED - Normal   POTASSIUM   TYPE AND SCREEN    Narrative:     The following orders were created for panel order Type and screen.  Procedure                               Abnormality         Status                     ---------                               -----------         ------                     ABORH (Blood Type)[[id number]]                               Final result               Antibody Screen[[id number]]                                  Final result                 Please view results for these tests on the individual orders.   ABORH (BLOOD TYPE)   ANTIBODY SCREEN   RAINBOW DRAW LAVENDER   RAINBOW DRAW LIGHT GREEN   RAINBOW DRAW BLUE                 MDM      Patient is a 63 yo M with a history of PUD, DM, HTN, HLD who presents to ED for evaluation of dark stool over past week. Pt reports initially started as dark brown but has progressed to black stool. VSS. No abd TTP. Pt has black, hemoccult pos stool.     CBC shows hgb decreased from 9.5 ot 8.9 three days  ago, plts 147. Cmp unremarkable. Pt given IV protonix 40 mg. [facility]. Will keep NPO for likely EGD tomorrow. Continue PPI BID. [facility]. Updated on plan of care.     Admission disposition: 1/20/2025  9:19 PM           Medical Decision Making  Amount and/or Complexity of Data Reviewed  External Data Reviewed: labs and notes.     Details: Prior admission note  Labs: ordered. Decision-making details documented in ED Course.  Discussion of management or test interpretation with external provider(s): GI, hospitalist        Disposition and Plan     Clinical Impression:  1. Melena    2. Anemia, unspecified type         Disposition:  Admit  1/20/2025  9:19 pm    Follow-up:  No follow-up provider specified.        Medications Prescribed:  Current Discharge Medication List              Supplementary Documentation:         Hospital Problems       Present on Admission  Date Reviewed: 1/16/2025            ICD-10-CM Noted POA    * (Principal) Melena K92.1 1/16/2025 Unknown    Anemia, unspecified type D64.9 7/27/2019 Unknown

## 2025-01-21 NOTE — PROGRESS NOTES
NURSING ADMISSION NOTE    Patient admitted via cart  Oriented to room  Safety precautions initiated  Bed in low position  Call light within reach  Skin assessment completed with PCT    Patient alert and oriented x 4. Up SBA. On RA with adequate O2 saturations. NSR on tele. Continent of bowel and bladder. No complaints of pain, shortness of breath or chest pain/discomfort. IVF infusing. POC : IVF, monitor Hgb/stool, possible EGD. Fall precautions in place. Call light within reach.

## 2025-01-21 NOTE — ANESTHESIA PREPROCEDURE EVALUATION
PRE-OP EVALUATION    Patient Name: Moises Olsen    Admit Diagnosis: Melena [K92.1]  Anemia, unspecified type [D64.9]    Pre-op Diagnosis: Melena    ESOPHAGOGASTRODUODENOSCOPY (EGD)    Anesthesia Procedure: ESOPHAGOGASTRODUODENOSCOPY (EGD)    Surgeons and Role:     * Geoffrey Milan MD - Primary    Pre-op vitals reviewed.  Temp: 98 °F (36.7 °C)  Pulse: 71  Resp: 18  BP: 125/61  SpO2: 93 %  Body mass index is 34.87 kg/m².    Current medications reviewed.  Hospital Medications:   pantoprazole (Protonix) 40 mg in sodium chloride 0.9% PF 10 mL IV push  40 mg Intravenous Q12H    [COMPLETED] pantoprazole (Protonix) 40 mg in sodium chloride 0.9% PF 10 mL IV push  40 mg Intravenous Once    atorvastatin (Lipitor) tab 40 mg  40 mg Oral Nightly    sodium chloride 0.9% infusion   Intravenous Continuous    acetaminophen (Tylenol Extra Strength) tab 500 mg  500 mg Oral Q4H PRN    polyethylene glycol (PEG 3350) (Miralax) 17 g oral packet 17 g  17 g Oral Daily PRN    sennosides (Senokot) tab 17.2 mg  17.2 mg Oral Nightly PRN    bisacodyl (Dulcolax) 10 MG rectal suppository 10 mg  10 mg Rectal Daily PRN    fleet enema (Fleet) rectal enema 133 mL  1 enema Rectal Once PRN    ondansetron (Zofran) 4 MG/2ML injection 4 mg  4 mg Intravenous Q6H PRN    prochlorperazine (Compazine) 10 MG/2ML injection 5 mg  5 mg Intravenous Q8H PRN    [COMPLETED] potassium chloride (Klor-Con M20) tab 40 mEq  40 mEq Oral Once       Outpatient Medications:   Prescriptions Prior to Admission[1]    Allergies: Patient has no known allergies.      Anesthesia Evaluation    Patient summary reviewed.    Anesthetic Complications  (-) history of anesthetic complications         GI/Hepatic/Renal                                 Cardiovascular        Exercise tolerance: good     MET: >4    (+) obesity  (+) hypertension   (+) hyperlipidemia                                  Endo/Other      (+) diabetes         (+) anemia                   Pulmonary                            Neuro/Psych                              Patient Active Problem List:     Gastrointestinal hemorrhage     Gastrointestinal hemorrhage, unspecified gastrointestinal hemorrhage type     Anemia, unspecified type     Renal insufficiency     Type 2 diabetes mellitus with obesity (HCC)     Accelerated hypertension     Mixed hyperlipidemia     History of GI bleed     Upper GI bleed     Hypertension     Undiagnosed cardiac murmurs     Melena    smoker        History reviewed. No pertinent surgical history.  Social History     Socioeconomic History    Marital status: Single   Tobacco Use    Smoking status: Former     Current packs/day: 0.50     Average packs/day: 0.5 packs/day for 30.0 years (15.0 ttl pk-yrs)     Types: Cigarettes    Smokeless tobacco: Never    Tobacco comments:     6-7 cigarettes a day   Vaping Use    Vaping status: Every Day   Substance and Sexual Activity    Alcohol use: Not Currently     History   Drug Use Not on file     Available pre-op labs reviewed.  Lab Results   Component Value Date    WBC 6.2 01/21/2025    RBC 3.02 (L) 01/21/2025    HGB 7.7 (L) 01/21/2025    HCT 25.4 (L) 01/21/2025    MCV 84.1 01/21/2025    MCH 25.5 (L) 01/21/2025    MCHC 30.3 (L) 01/21/2025    RDW 19.9 01/21/2025    .0 (L) 01/21/2025     Lab Results   Component Value Date     01/20/2025    K 4.1 01/21/2025     01/20/2025    CO2 26.0 01/20/2025    BUN 13 01/20/2025    CREATSERUM 0.87 01/20/2025     (H) 01/20/2025    CA 9.5 01/20/2025     Lab Results   Component Value Date    INR 1.25 (H) 01/20/2025         Airway      Mallampati: III  Mouth opening: >3 FB  TM distance: 4 - 6 cm  Neck ROM: full Cardiovascular      Rhythm: regular  Rate: normal     Dental    Dentition appears grossly intact         Pulmonary      Breath sounds clear to auscultation bilaterally.               Other findings              ASA: 3   Plan: MAC  NPO status verified and patient meets guidelines.    Post-procedure pain management  plan discussed with surgeon and patient.      Plan/risks discussed with: patient                Present on Admission:  **None**             [1]   Medications Prior to Admission   Medication Sig Dispense Refill Last Dose/Taking    pantoprazole 40 MG Oral Tab EC Take 1 tablet (40 mg total) by mouth 2 (two) times daily before meals. 60 tablet 1 2025 Morning    Ferrous Sulfate (IRON) 325 (65 Fe) MG Oral Tab Take by mouth.   2025 Morning    Losartan Potassium-HCTZ 100-25 MG Oral Tab Take 1 tablet by mouth daily. 90 tablet 1 2025 Morning    atorvastatin 40 MG Oral Tab Take 1 tablet (40 mg total) by mouth nightly. 90 tablet 1 2025 Evening    [] sucralfate 1 GM/10ML Oral Suspension Take 10 mL (1 g total) by mouth 4 (four) times daily before meals and nightly (2200) for 10 days. 414 mL 0

## 2025-01-21 NOTE — CONSULTS
[facility]                       Gastroenterology Consultation-[facility]    Moises Olsen Patient Status:  Observation    7/15/1960 MRN [id number]   Location [facility] 8NE-A Attending Familia Arias MD   Hosp Day # 0 PCP Osiel Rader MD     Reason for consultation: Recurrent black stool, hx of duodenal ulcers  HPI: This is a 64 yr-old male with PMhx that includes HTN, dyslipidemia, DM, and duodenal ulcer () who was seen by GI service 12/ when admitted with epigastric discomfort, melena s/p EGD (Dr Pichardo) revealing two clean based duodenal bulb ulcers without active bleeding--4 mm + 10 mm on opposing walls of the duodenum--bx H Pylori +. Pt was discharged on PPI BID AC + Carafate QID returning to ER yesterday with a return of dark stools.   Pt reports a return of black, formed stool 2 x daily starting . No abd pain, nausea, vomiting, GERD, dysphagia, or odynophagia. No change in appetite or wt. No diarrhea or hematochezia. No NSAIDs, fevers, chills, or recent abx. No chronic anticoagulants or antiplatelet agents. No EtOH, + daily pipe smoker. Pt reports compliance with PPI BID AC and completed Carafate as prescribed--he did not complete H Pylori tx as he was waiting to have insurance (started ). No chest pain or dyspnea.   No new imaging; labs with Hgb 7.7 (MCV 84) from 8.9 on arrival and Hgb 11 when discharged last month. Plt 145, normal INR (1.25), and normal BUN/creat.   Pt has remained hemodynamically stable and reports passing 1 black stool since admission.   PMHx:   Past Medical History:    Anemia    Diabetes (HCC)    Essential hypertension    GI bleed    History of blood transfusion    Hyperlipidemia                PSHx: History reviewed. No pertinent surgical history.  Medications:    pantoprazole (Protonix) 40 mg in sodium chloride 0.9% PF 10 mL IV push  40 mg Intravenous Q12H    [COMPLETED] pantoprazole (Protonix) 40 mg in sodium chloride 0.9% PF 10 mL  IV push  40 mg Intravenous Once    atorvastatin (Lipitor) tab 40 mg  40 mg Oral Nightly    sodium chloride 0.9% infusion   Intravenous Continuous    acetaminophen (Tylenol Extra Strength) tab 500 mg  500 mg Oral Q4H PRN    polyethylene glycol (PEG 3350) (Miralax) 17 g oral packet 17 g  17 g Oral Daily PRN    sennosides (Senokot) tab 17.2 mg  17.2 mg Oral Nightly PRN    bisacodyl (Dulcolax) 10 MG rectal suppository 10 mg  10 mg Rectal Daily PRN    fleet enema (Fleet) rectal enema 133 mL  1 enema Rectal Once PRN    ondansetron (Zofran) 4 MG/2ML injection 4 mg  4 mg Intravenous Q6H PRN    prochlorperazine (Compazine) 10 MG/2ML injection 5 mg  5 mg Intravenous Q8H PRN    [COMPLETED] potassium chloride (Klor-Con M20) tab 40 mEq  40 mEq Oral Once     Allergies: Allergies[1]  Social HX:   Social History     Socioeconomic History    Marital status: Single   Tobacco Use    Smoking status: Former     Current packs/day: 0.50     Average packs/day: 0.5 packs/day for 30.0 years (15.0 ttl pk-yrs)     Types: Cigarettes    Smokeless tobacco: Never    Tobacco comments:     6-7 cigarettes a day   Vaping Use    Vaping status: Every Day   Substance and Sexual Activity    Alcohol use: Not Currently     Social Drivers of Health     Financial Resource Strain: Not on File (7/20/2023)    Received from [facility]    Financial Resource Strain     Financial Resource Strain: 0   Food Insecurity: No Food Insecurity (1/20/2025)    Food Insecurity     Food Insecurity: Never true   Transportation Needs: No Transportation Needs (1/20/2025)    Transportation Needs     Lack of Transportation: No   Physical Activity: Not on File (7/20/2023)    Received from [facility]    Physical Activity     Physical Activity: 0   Stress: Not on File (7/20/2023)    Received from [facility]    Stress     Stress: 0   Social Connections: Not on File (9/10/2024)    Received from [facility]    Social Connections     Connectedness: 0   Housing Stability: Low Risk  (1/20/2025)    Housing  Stability     Housing Instability: No      FamHx: The patient has no family history of colon cancer or other gastrointestinal malignancies;  No family history of ulcer disease, or inflammatory bowel disease  ROS:  In addition to the pertinent positives described above:            Infectious Disease:  + H Pylori             Cardiovascular: + HTN, dyslipidemia             Respiratory: No shortness of breath, asthma, copd, recurrent pneumonia            Hematologic: The patient reports no easy bruising, frequent gum bleeding or nose bleeding;  + anemia            Dermatologic: The patient reports no recent rashes or chronic skin disorders            Rheumatologic: The patient reports no history of chronic arthritis, myalgias, arthralgias            Genitourinary:  The patient reports no history of recurrent urinary tract infections, hematuria, dysuria, or nephrolithiasis           Psychiatric: The patient reports no history of depression, anxiety, suicidal ideation, or homicidal ideation           Oncologic: The patient reports no history of prior solid tumor or hematologic malignancy           ENT: The patient reports no hoarseness of voice, hearing loss, sinus congestion, tinnitus           Neurologic: The patient reports no history of seizure, stroke, or frequent headaches  PE: /63 (BP Location: Left arm)   Pulse 77   Temp 98.4 °F (36.9 °C) (Oral)   Resp 16   Ht 5' 7\" (1.702 m)   Wt 222 lb 10.6 oz (101 kg)   SpO2 95%   BMI 34.87 kg/m²   Gen: AAO x 3, able to speak in complete sentences  HENT: EOMI, PERRL, oropharynx is clear with moist mucosal membranes  Eyes: Sclerae are anicteric  Neck:  Supple without nuchal rigidity  CV: Regular rate and rhythm, with normal S1 and S2  Resp: Clear to auscultation bilaterally without wheezes; rubs, rhonchi, or rales  Abdomen: Obese, soft, non-tender, non-distended with the presence of bowel sounds; No hepatosplenomegaly; no rebound or guarding; No ascites is  clinically apparent; no tympany to percussion  Ext: No peripheral edema or cyanosis  Skin: Warm and dry  Psychiatric: Appropriate mood and congruent affect without obvious depression or anxiety  Labs:   Lab Results   Component Value Date    WBC 6.2 01/21/2025    HGB 7.7 01/21/2025    HCT 25.4 01/21/2025    .0 01/21/2025    CREATSERUM 0.87 01/20/2025    BUN 13 01/20/2025     01/20/2025    K 4.1 01/21/2025     01/20/2025    CO2 26.0 01/20/2025     01/20/2025    CA 9.5 01/20/2025    ALB 4.5 01/20/2025    ALKPHO 67 01/20/2025    BILT 0.5 01/20/2025    AST 23 01/20/2025    ALT 21 01/20/2025    PTT 34.6 01/20/2025    INR 1.25 01/20/2025    PTP 15.8 01/20/2025     Recent Labs   Lab 01/17/25  1633 01/20/25  1734 01/21/25  0543   * 102*  --    BUN 11 13  --    CREATSERUM 0.77 0.87  --    CA 9.8 9.5  --     142  --    K 3.8 3.7 4.1    107  --    CO2 28.0 26.0  --      Recent Labs   Lab 01/21/25  0604   RBC 3.02*   HGB 7.7*   HCT 25.4*   MCV 84.1   MCH 25.5*   MCHC 30.3*   RDW 19.9   NEPRELIM 3.35   WBC 6.2   .0*       Recent Labs   Lab 01/17/25  1633 01/20/25  1734   ALT 20 21   AST 19 23       Impression: 64 yr-old male with hx of HTN, dyslipidemia, DM, and duodenal ulcer (2019) known to GI service 12/15-12/17/2024 when admitted with epigastric discomfort, melena s/p EGD (Dr Pichardo) revealing two clean based duodenal bulb ulcers without active bleeding--4 mm + 10 mm on opposing walls of the duodenum--bx H Pylori + who returned to ER yesterday with recurrent black stools. Hgb dropped to 7.7 from 11 last month, plt low end and normal BUN. No NSAIDs, chronic anticoagulants, or antiplatelet agents. + smoker  Will treat with PPI IV BID, trend Hgb transfusing as needed to maintain Hgb >7 and plan for repeat EGD today to assess for active bleeding.   The risks, benefits, alternatives of the procedure including the risks of anesthesia, bleeding, perforation, missed lesions, need for  surgery, and infection were discussed with the patient. He expressed understanding of the risks and was agreeable to proceed.  Recommendations:     Plan for EGD under MAC with Dr Milan  NPO with sips of water for necessary medications   Protonix 40 mg IV BID   Please hold non-ASA NSAIDs  Defer H Pylori treatment to outpatient setting   Monitor for overt GI bleeding; NO need for occult stool testing   Serial Hgb monitoring transfusing as needed to maintain Hgb >7    Thank you for the consultation, we will follow the patient with you.  Attending addendum (Dr Milan) to follow later today and provide formal, final recommendations at that time   RAMYA Campa  8:30 AM  1/21/2025  [facility]  [phone]         [1] No Known Allergies

## 2025-01-22 ENCOUNTER — ANESTHESIA EVENT (OUTPATIENT)
Dept: ENDOSCOPY | Facility: HOSPITAL | Age: 65
End: 2025-01-22
Payer: COMMERCIAL

## 2025-01-22 ENCOUNTER — ANESTHESIA (OUTPATIENT)
Dept: ENDOSCOPY | Facility: HOSPITAL | Age: 65
End: 2025-01-22
Payer: COMMERCIAL

## 2025-01-22 VITALS
HEART RATE: 60 BPM | SYSTOLIC BLOOD PRESSURE: 132 MMHG | OXYGEN SATURATION: 95 % | BODY MASS INDEX: 34.95 KG/M2 | RESPIRATION RATE: 20 BRPM | TEMPERATURE: 98 F | WEIGHT: 222.69 LBS | DIASTOLIC BLOOD PRESSURE: 72 MMHG | HEIGHT: 67 IN

## 2025-01-22 LAB
ANION GAP SERPL CALC-SCNC: 9 MMOL/L (ref 0–18)
BASOPHILS # BLD AUTO: 0.04 X10(3) UL (ref 0–0.2)
BASOPHILS NFR BLD AUTO: 0.9 %
BUN BLD-MCNC: 14 MG/DL (ref 9–23)
CALCIUM BLD-MCNC: 9.4 MG/DL (ref 8.7–10.6)
CHLORIDE SERPL-SCNC: 107 MMOL/L (ref 98–112)
CO2 SERPL-SCNC: 28 MMOL/L (ref 21–32)
CREAT BLD-MCNC: 0.73 MG/DL
EGFRCR SERPLBLD CKD-EPI 2021: 102 ML/MIN/1.73M2 (ref 60–?)
EOSINOPHIL # BLD AUTO: 0.14 X10(3) UL (ref 0–0.7)
EOSINOPHIL NFR BLD AUTO: 3 %
ERYTHROCYTE [DISTWIDTH] IN BLOOD BY AUTOMATED COUNT: 19.3 %
GLUCOSE BLD-MCNC: 86 MG/DL (ref 70–99)
GLUCOSE BLD-MCNC: 90 MG/DL (ref 70–99)
HCT VFR BLD AUTO: 25.4 %
HGB BLD-MCNC: 7.7 G/DL
HGB BLD-MCNC: 7.7 G/DL
IMM GRANULOCYTES # BLD AUTO: 0.02 X10(3) UL (ref 0–1)
IMM GRANULOCYTES NFR BLD: 0.4 %
LYMPHOCYTES # BLD AUTO: 1.43 X10(3) UL (ref 1–4)
LYMPHOCYTES NFR BLD AUTO: 31 %
MCH RBC QN AUTO: 24.8 PG (ref 26–34)
MCHC RBC AUTO-ENTMCNC: 30.3 G/DL (ref 31–37)
MCV RBC AUTO: 81.9 FL
MONOCYTES # BLD AUTO: 0.46 X10(3) UL (ref 0.1–1)
MONOCYTES NFR BLD AUTO: 10 %
NEUTROPHILS # BLD AUTO: 2.52 X10 (3) UL (ref 1.5–7.7)
NEUTROPHILS # BLD AUTO: 2.52 X10(3) UL (ref 1.5–7.7)
NEUTROPHILS NFR BLD AUTO: 54.7 %
OSMOLALITY SERPL CALC.SUM OF ELEC: 298 MOSM/KG (ref 275–295)
PLATELET # BLD AUTO: 158 10(3)UL (ref 150–450)
POTASSIUM SERPL-SCNC: 4.1 MMOL/L (ref 3.5–5.1)
RBC # BLD AUTO: 3.1 X10(6)UL
SODIUM SERPL-SCNC: 144 MMOL/L (ref 136–145)
WBC # BLD AUTO: 4.6 X10(3) UL (ref 4–11)

## 2025-01-22 PROCEDURE — 99239 HOSP IP/OBS DSCHRG MGMT >30: CPT | Performed by: HOSPITALIST

## 2025-01-22 PROCEDURE — 0DBL8ZX EXCISION OF TRANSVERSE COLON, VIA NATURAL OR ARTIFICIAL OPENING ENDOSCOPIC, DIAGNOSTIC: ICD-10-PCS | Performed by: INTERNAL MEDICINE

## 2025-01-22 RX ORDER — HYDROMORPHONE HYDROCHLORIDE 1 MG/ML
0.6 INJECTION, SOLUTION INTRAMUSCULAR; INTRAVENOUS; SUBCUTANEOUS EVERY 5 MIN PRN
Status: DISCONTINUED | OUTPATIENT
Start: 2025-01-22 | End: 2025-01-22 | Stop reason: HOSPADM

## 2025-01-22 RX ORDER — SODIUM CHLORIDE, SODIUM LACTATE, POTASSIUM CHLORIDE, CALCIUM CHLORIDE 600; 310; 30; 20 MG/100ML; MG/100ML; MG/100ML; MG/100ML
INJECTION, SOLUTION INTRAVENOUS CONTINUOUS PRN
Status: DISCONTINUED | OUTPATIENT
Start: 2025-01-22 | End: 2025-01-22 | Stop reason: SURG

## 2025-01-22 RX ORDER — NALOXONE HYDROCHLORIDE 0.4 MG/ML
0.08 INJECTION, SOLUTION INTRAMUSCULAR; INTRAVENOUS; SUBCUTANEOUS AS NEEDED
Status: DISCONTINUED | OUTPATIENT
Start: 2025-01-22 | End: 2025-01-22 | Stop reason: HOSPADM

## 2025-01-22 RX ORDER — ONDANSETRON 2 MG/ML
4 INJECTION INTRAMUSCULAR; INTRAVENOUS EVERY 6 HOURS PRN
Status: DISCONTINUED | OUTPATIENT
Start: 2025-01-22 | End: 2025-01-22 | Stop reason: HOSPADM

## 2025-01-22 RX ORDER — NICOTINE POLACRILEX 4 MG
30 LOZENGE BUCCAL
Status: DISCONTINUED | OUTPATIENT
Start: 2025-01-22 | End: 2025-01-22 | Stop reason: HOSPADM

## 2025-01-22 RX ORDER — HYDROMORPHONE HYDROCHLORIDE 1 MG/ML
0.2 INJECTION, SOLUTION INTRAMUSCULAR; INTRAVENOUS; SUBCUTANEOUS EVERY 5 MIN PRN
Status: DISCONTINUED | OUTPATIENT
Start: 2025-01-22 | End: 2025-01-22 | Stop reason: HOSPADM

## 2025-01-22 RX ORDER — NICOTINE POLACRILEX 4 MG
15 LOZENGE BUCCAL
Status: DISCONTINUED | OUTPATIENT
Start: 2025-01-22 | End: 2025-01-22 | Stop reason: HOSPADM

## 2025-01-22 RX ORDER — LIDOCAINE HYDROCHLORIDE 10 MG/ML
INJECTION, SOLUTION EPIDURAL; INFILTRATION; INTRACAUDAL; PERINEURAL AS NEEDED
Status: DISCONTINUED | OUTPATIENT
Start: 2025-01-22 | End: 2025-01-22 | Stop reason: SURG

## 2025-01-22 RX ORDER — PROCHLORPERAZINE EDISYLATE 5 MG/ML
5 INJECTION INTRAMUSCULAR; INTRAVENOUS EVERY 8 HOURS PRN
Status: DISCONTINUED | OUTPATIENT
Start: 2025-01-22 | End: 2025-01-22 | Stop reason: HOSPADM

## 2025-01-22 RX ORDER — HYDROMORPHONE HYDROCHLORIDE 1 MG/ML
0.4 INJECTION, SOLUTION INTRAMUSCULAR; INTRAVENOUS; SUBCUTANEOUS EVERY 5 MIN PRN
Status: DISCONTINUED | OUTPATIENT
Start: 2025-01-22 | End: 2025-01-22 | Stop reason: HOSPADM

## 2025-01-22 RX ORDER — DEXTROSE MONOHYDRATE 25 G/50ML
50 INJECTION, SOLUTION INTRAVENOUS
Status: DISCONTINUED | OUTPATIENT
Start: 2025-01-22 | End: 2025-01-22 | Stop reason: HOSPADM

## 2025-01-22 RX ORDER — SODIUM CHLORIDE, SODIUM LACTATE, POTASSIUM CHLORIDE, CALCIUM CHLORIDE 600; 310; 30; 20 MG/100ML; MG/100ML; MG/100ML; MG/100ML
INJECTION, SOLUTION INTRAVENOUS CONTINUOUS
Status: DISCONTINUED | OUTPATIENT
Start: 2025-01-22 | End: 2025-01-22

## 2025-01-22 RX ADMIN — SODIUM CHLORIDE, SODIUM LACTATE, POTASSIUM CHLORIDE, CALCIUM CHLORIDE: 600; 310; 30; 20 INJECTION, SOLUTION INTRAVENOUS at 10:58:00

## 2025-01-22 RX ADMIN — LIDOCAINE HYDROCHLORIDE 25 MG: 10 INJECTION, SOLUTION EPIDURAL; INFILTRATION; INTRACAUDAL; PERINEURAL at 10:59:00

## 2025-01-22 NOTE — PLAN OF CARE
Assumed care of patient at 0700. Alert and oriented x4. Maintains o2 on RA. Lung sounds clear. NSR on tele monitor. No complaints of pain at this time. Prepped for colonoscopy. Continent of bowel and bladder. Melena stools x6 days. Up ad lemuel in room. Personal possession and call light within reach. Bed locked, in lowest position. Updated on plan of care.     Problem: Patient/Family Goals  Goal: Patient/Family Long Term Goal  Description: Patient's Long Term Goal: Stay out of hospital    Interventions:  - Follow up with PCP after discharge  -Take medication as prescribed  - See additional Care Plan goals for specific interventions  Outcome: Progressing  Goal: Patient/Family Short Term Goal  Description: Patient's Short Term Goal: Go home    Interventions:   - Test ordered  - Monitor on tele  - Monitor labs  - See additional Care Plan goals for specific interventions  Outcome: Progressing     Problem: HEMATOLOGIC - ADULT  Goal: Maintains hematologic stability  Description: INTERVENTIONS  - Assess for signs and symptoms of bleeding or hemorrhage  - Monitor labs and vital signs for trends  - Administer supportive blood products/factors, fluids and medications as ordered and appropriate  - Administer supportive blood products/factors as ordered and appropriate  Outcome: Progressing  Goal: Free from bleeding injury  Description: (Example usage: patient with low platelets)  INTERVENTIONS:  - Avoid intramuscular injections, enemas and rectal medication administration  - Ensure safe mobilization of patient  - Hold pressure on venipuncture sites to achieve adequate hemostasis  - Assess for signs and symptoms of internal bleeding  - Monitor lab trends  - Patient is to report abnormal signs of bleeding to staff  - Avoid use of toothpicks and dental floss  - Use electric shaver for shaving  - Use soft bristle tooth brush  - Limit straining and forceful nose blowing  Outcome: Progressing

## 2025-01-22 NOTE — DISCHARGE SUMMARY
[facility]     Moises Olsen Patient Status:  Observation    7/15/1960 MRN [id number]   Location [facility] Attending Familia Arias MD   Hosp Day # 0 PCP Osiel Rader MD     Date of Admission: 2025  Date of Discharge:   2025    Discharge Disposition: Home or Self Care    Discharge Diagnosis:  Hypotension d/t hypovolemia d/t anemia d/t GIB, improving   Melena d/t UGIB sp EGD  and cscope  without culprit for GIB  Recent UGIB d/t duodenal ulcer 2024 d/t H pylori and nicotine dependence  Anemia, acute blood loss and iron deficiency   Essential hypertension on Losartan-hydrochlorothiazide   Dyslipidemia on Lipitor   Prediabetes, A1c 5.7    History of Present Illness: Moises Olsen is a 64 year old male with history of diabetes type 2 peptic ulcer disease hypertension iron deficiency anemia who presents with melena.  Patient was admitted in December with upper GI bleeding and he was found to have duodenal ulcers.He was called about h pilory positive didn't answer the phone.He denies taking nsaids.     Brief Synopsis: Patient presented with melena. He was admitted with GI on consult. Patient underwent EGD and cscope without explanation of melena. Plan for home on PPI and outpatient capsule study. Of note, patient also Hpylori positive from  and still requires therapy. Patient to follow-up with GI.    Lace+ Score: 42  59-90 High Risk  29-58 Medium Risk  0-28   Low Risk       TCM Follow-Up Recommendation:  LACE > 58: High Risk of readmission after discharge from the hospital.        Discharge Medication List:     Discharge Medications        CONTINUE taking these medications        Instructions Prescription details   atorvastatin 40 MG Tabs  Commonly known as: Lipitor      Take 1 tablet (40 mg total) by mouth nightly.   Quantity: 90 tablet  Refills: 1     Iron 325 (65 Fe) MG Tabs      Take by mouth.   Refills: 0      losartan-hydroCHLOROthiazide 100-25 MG Tabs  Commonly known as: Hyzaar      Take 1 tablet by mouth daily.   Quantity: 90 tablet  Refills: 1     pantoprazole 40 MG Tbec  Commonly known as: Protonix      Take 1 tablet (40 mg total) by mouth 2 (two) times daily before meals.   Quantity: 60 tablet  Refills: 1            STOP taking these medications      sucralfate 1 GM/10ML Susp  Commonly known as: Carafate                 [place]    Follow-up appointment:   Laron Pichardo MD  [address] [place] [address] [phone]    Go in 3 week(s)  for a follow-up office visit with GI., The office will call to arrange date/time of visit. You will also need an outpatient video capsule endoscopy--the office will call you to arrange the procedure    Osiel Rader MD  [address] [address] [phone]    Schedule an appointment as soon as possible for a visit      Appointments for Next 30 Days 2025 - 2025      None              -----------------------------------------------------------------------------------------------  PATIENT DISCHARGE INSTRUCTIONS: See electronic chart    Familia Arias MD    Total time spent on discharge plannin minutes     The  [employer] Cures Act makes medical notes like these available to patients in the interest of transparency. Please be advised this is a medical document. Medical documents are intended to carry relevant information, facts as evident, and the clinical opinion of the practitioner. The medical note is intended as peer to peer communication and may appear blunt or direct. It is written in medical language and may contain abbreviations or verbiage that are unfamiliar.

## 2025-01-22 NOTE — PLAN OF CARE
Patient alert and oriented x 4. Up  ad lemuel. On RA. NSR on tele. Continent of bowel and bladder. No complaints of pain, shortness of breath or chest pain/discomfort. LR infusing. Bowel prep started. Consent signed and placed in chart. POC : Bowel prep for colonoscopy, IV protonix. Fall precautions in place. Call light within reach.    Problem: Patient/Family Goals  Goal: Patient/Family Long Term Goal  Description: Patient's Long Term Goal: Stay out of hospital    Interventions:  - Follow up with PCP after discharge  -Take medication as prescribed  - See additional Care Plan goals for specific interventions  Outcome: Progressing  Goal: Patient/Family Short Term Goal  Description: Patient's Short Term Goal: Go home    Interventions:   - Test ordered  - Monitor on tele  - Monitor labs  - See additional Care Plan goals for specific interventions  Outcome: Progressing     Problem: HEMATOLOGIC - ADULT  Goal: Maintains hematologic stability  Description: INTERVENTIONS  - Assess for signs and symptoms of bleeding or hemorrhage  - Monitor labs and vital signs for trends  - Administer supportive blood products/factors, fluids and medications as ordered and appropriate  - Administer supportive blood products/factors as ordered and appropriate  Outcome: Progressing  Goal: Free from bleeding injury  Description: (Example usage: patient with low platelets)  INTERVENTIONS:  - Avoid intramuscular injections, enemas and rectal medication administration  - Ensure safe mobilization of patient  - Hold pressure on venipuncture sites to achieve adequate hemostasis  - Assess for signs and symptoms of internal bleeding  - Monitor lab trends  - Patient is to report abnormal signs of bleeding to staff  - Avoid use of toothpicks and dental floss  - Use electric shaver for shaving  - Use soft bristle tooth brush  - Limit straining and forceful nose blowing  Outcome: Progressing

## 2025-01-22 NOTE — PROGRESS NOTES
[facility]   part of [facility]     Hospitalist Progress Note     Moises Olsen Patient Status:  Observation    7/15/1960 MRN [id number]   Location [facility] 8NE-A Attending Familia Arias MD   Hosp Day # 0 PCP Osiel Rader MD     Chief Complaint: GIB    Subjective:   Patient without abdominal pain. No melena. Clear-yellow stool after cscope prep.    Current medications:   pantoprazole  40 mg Intravenous Q12H    atorvastatin  40 mg Oral Nightly       Objective:    Review of Systems:   10 point ROS completed and was negative, except for pertinent positive and negatives stated in subjective.    Vital signs:  Temp:  [98 °F (36.7 °C)-98.7 °F (37.1 °C)] 98.1 °F (36.7 °C)  Pulse:  [58-78] 72  Resp:  [12-18] 17  BP: ()/(54-76) 156/76  SpO2:  [90 %-98 %] 96 %  Patient Weight for the past 72 hrs:   Weight   25 1727 227 lb 1.2 oz (103 kg)   25 2212 222 lb 10.6 oz (101 kg)     Physical Exam:    General: No acute distress.   Respiratory: Clear to auscultation bilaterally.   Cardiovascular: S1, S2. Regular rate and rhythm.   Abdomen: Soft, nontender, nondistended.  Positive bowel sounds.   Extremities: No edema.  Neuro: AAOx3    Diagnostic Data:    Labs:  Recent Labs   Lab 25  1633 25  1734 25  2341 25  0604 25  1814 25  0509   WBC 6.6 6.5 6.6 6.2  --  4.6   HGB 9.5* 8.9* 8.1* 7.7* 8.0* 7.7*  7.7*   MCV 82.4 80.5 82.8 84.1  --  81.9   .0 147.0* 162.0 145.0*  --  158.0   INR  --  1.25*  --   --   --   --        Recent Labs   Lab 25  1633 25  1734 25  0543 25  0509   * 102*  --  86   BUN 11 13  --  14   CREATSERUM 0.77 0.87  --  0.73   CA 9.8 9.5  --  9.4   ALB 4.1 4.5  --   --     142  --  144   K 3.8 3.7 4.1 4.1    107  --  107   CO2 28.0 26.0  --  28.0   ALKPHO 65 67  --   --    AST 19 23  --   --    ALT 20 21  --   --    BILT 0.4 0.5  --   --    TP 6.8 6.9  --   --        Estimated Creatinine Clearance:  95.6 mL/min (based on SCr of 0.73 mg/dL).    Recent Labs   Lab 01/20/25  1734   PTP 15.8*   INR 1.25*            COVID-19 Lab Results    COVID-19  Lab Results   Component Value Date    COVID19 Detected (A) 08/27/2020       Pro-Calcitonin  No results for input(s): \"PCT\" in the last 168 hours.    Cardiac  No results for input(s): \"TROP\", \"PBNP\" in the last 168 hours.    Creatinine Kinase  No results for input(s): \"CK\" in the last 168 hours.    Inflammatory Markers  No results for input(s): \"CRP\", \"MAYRA\", \"LDH\", \"DDIMER\" in the last 168 hours.    No results for input(s): \"TROP\", \"TROPHS\", \"CK\" in the last 168 hours.    Imaging: Imaging data reviewed in Epic.    Medications:    pantoprazole  40 mg Intravenous Q12H    atorvastatin  40 mg Oral Nightly       Assessment & Plan:    Hypotension d/t hypovolemia d/t anemia d/t GIB, improving   Melena d/t UGIB sp EGD 1/21 without culprit for GIB  Recent UGIB d/t duodenal ulcer 12/2024 d/t H pylori and nicotine dependence  Anemia, acute blood loss and iron deficiency   Essential hypertension on Losartan-hydrochlorothiazide   Dyslipidemia on Lipitor   Prediabetes, A1c 5.7    Plan:  NPO  IVF  PPI IV BID  Hold antihypertensives  Monitor hemoglobin   Cscope today   Will need to start treatment for Hpylori   GI consult    DVT Px: SCDs    At this point Mr. Olsen is expected to be discharge to: Home    Plan of care discussed with patient and RN.    Familia Arias MD        Supplementary Documentation:   DVT Mechanical Prophylaxis:   SCDs, Early ambuation  DVT Pharmacologic Prophylaxis   Medication   None                Code Status: Not on file  Sutton: No urinary catheter in place  Sutton Duration (in days):   Central line:    CA:

## 2025-01-22 NOTE — ANESTHESIA POSTPROCEDURE EVALUATION
[facility]    Moises Olsen Patient Status:  Observation   Age/Gender 64 year old male MRN [id number]   Location [facility] Attending Familia Arias MD   Hosp Day # 0 PCP Osiel Rader MD       Anesthesia Post-op Note    COLONOSCOPY with cold snare polypectomy    Procedure Summary       Date: 01/22/25 Room / Location:  ENDOSCOPY 02 / [facility] END[facility]COPY    Anesthesia Start: 1057 Anesthesia Stop: 1124    Procedure: COLONOSCOPY with cold snare polypectomy Diagnosis: (diverticulosis, colon polyps, hemorrhoids)    Surgeons: Geoffrey Milan MD Anesthesiologist: Reddy Leary MD    Anesthesia Type: MAC ASA Status: 3            Anesthesia Type: MAC    Vitals Value Taken Time   /83 01/22/25 1124   Temp 97.3 °F (36.3 °C) 01/22/25 1124   Pulse 66 01/22/25 1124   Resp 16 01/22/25 1124   SpO2 95 % 01/22/25 1124           Patient Location: Endoscopy    Anesthesia Type: MAC    Airway Patency: patent    Postop Pain Control: adequate    Mental Status: mildly sedated but able to meaningfully participate in the post-anesthesia evaluation    Nausea/Vomiting: none    Cardiopulmonary/Hydration status: stable euvolemic    Complications: no apparent anesthesia related complications    Postop vital signs: stable    Dental Exam: Unchanged from Preop

## 2025-01-22 NOTE — PLAN OF CARE
Problem: Patient/Family Goals  Goal: Patient/Family Long Term Goal  Description: Patient's Long Term Goal: Stay out of hospital    Interventions:  - Follow up with PCP after discharge  -Take medication as prescribed  - See additional Care Plan goals for specific interventions  1/22/2025 1621 by Murali Cabrera RN  Outcome: Adequate for Discharge  1/22/2025 1202 by Murali Cabrera RN  Outcome: Progressing  Goal: Patient/Family Short Term Goal  Description: Patient's Short Term Goal: Go home    Interventions:   - Test ordered  - Monitor on tele  - Monitor labs  - See additional Care Plan goals for specific interventions  1/22/2025 1621 by Murali Cabrera RN  Outcome: Adequate for Discharge  1/22/2025 1202 by Murali Caberra RN  Outcome: Progressing     Problem: HEMATOLOGIC - ADULT  Goal: Maintains hematologic stability  Description: INTERVENTIONS  - Assess for signs and symptoms of bleeding or hemorrhage  - Monitor labs and vital signs for trends  - Administer supportive blood products/factors, fluids and medications as ordered and appropriate  - Administer supportive blood products/factors as ordered and appropriate  1/22/2025 1621 by Murali Cabrera RN  Outcome: Adequate for Discharge  1/22/2025 1202 by Murali Cabrera RN  Outcome: Progressing  Goal: Free from bleeding injury  Description: (Example usage: patient with low platelets)  INTERVENTIONS:  - Avoid intramuscular injections, enemas and rectal medication administration  - Ensure safe mobilization of patient  - Hold pressure on venipuncture sites to achieve adequate hemostasis  - Assess for signs and symptoms of internal bleeding  - Monitor lab trends  - Patient is to report abnormal signs of bleeding to staff  - Avoid use of toothpicks and dental floss  - Use electric shaver for shaving  - Use soft bristle tooth brush  - Limit straining and forceful nose blowing  1/22/2025 1621 by Murali Cabrera RN  Outcome: Adequate for Discharge  1/22/2025 1202 by Murali Cabrera  RN  Outcome: Progressing

## 2025-01-22 NOTE — OPERATIVE REPORT
Colon operative report  Patient Name: Moises Olsen  Procedure: Colonoscopy with snare polypectomy  Date of procedure: 1/22/2025    Pre-operative Indication: Melena, acute blood loss anemia  Post-operative findings: Colon polyps, diverticulosis, hemorrhoids  Date of last colonoscopy: No prior examination  Attending: Geoffrey Milan M.D.  Consent: The risks, benefits, and alternatives were discussed with the patient / POA.  Risks included, but were not limited to, bleeding, perforation, medication effects, cardiac arrhythmias, missed polyps, and aspiration.  After all questions were answered to their satisfaction, a signed, informed, and witnessed consent was obtained.  Timeout:  Prior to initiation of sedation, a formal timeout was performed, confirming the patient's name, date of birth, allergies, correct procedure, and need for antibiotics.  The operating physician and sedating physician was also confirmed prior to initiation of sedation.     Sedation: Monitored Anesthesia Care  Monitoring: Pulsoximetry, pulse, respirations, and blood pressure were monitored throughout the entire procedure    Preparation Quality: Adequate           [place] Prep Score:  Right: 3, Middle: 3, Left: 2    Total: 8  Procedure: After achieving adequate sedation, and placing the patient in the left lateral decubitus position, a digital rectal examination was performed.  The lubricated tip of the pediatric colonoscope was then introduced into the rectum and advanced to the terminal ileum.  The appendiceal orifice and ileocecal valve were clearly and distinctly visualized, thus verifying the cecum.  The terminal ileum was intubated and found to be normal to the extent examined.  The endoscope was then carefully withdrawn from the patient with careful visualization of the colonic mucosa revealing no additional pathologic findings.  Air was suctioned to the best of my ability, during withdrawal of the endoscope.  When the endoscope reached the  rectum, it was placed in a retroflexed position, and the rectal bulb was thus visualized.  The endoscope was righted, and air was suctioned from the colon to the best of my ability, as it was during withdrawn from the colon.  The endoscope was then removed from the patient.  The patient tolerated the procedure without apparent procedural complications.  The patient left the procedure room in stable condition for recovery.  Findings:  There were grade II internal hemorrhoids.  There were no masses, fissures, fistulae, or external hemorrhoids.  The mucosa of the colon  was normal, from the rectum to the cecum.  There were no masses, ulcers, or erosions.  An occasional sigmoid diverticulum was appreciated.  In the mid-transverse colon, a 4 mm sessile polyp (Nice II) was resected by cold snare, using the standard hexagonal snare.  In the distal transverse colon, a 5 mm sessile polyp (Nice II) was resected by cold snare, using the standard hexagonal snare.  The appendiceal orifice and ileocecal valve were both clearly and distinctly visualized, thus verifying the cecum.  The terminal ileum was intubated and the terminal ileal mucosa was found to be normal to the extent examined.   Impression: Findings as above do not explain this patient's presentation.  Recommendations:   1) Follow-up pathology  2) Regular diet  3) Outpatient video capsule endoscopy  Repeat Colonoscopy Indicated: 7 years

## 2025-01-22 NOTE — ANESTHESIA PREPROCEDURE EVALUATION
PRE-OP EVALUATION    Patient Name: Moises Olsen    Admit Diagnosis: Melena [K92.1]  Anemia, unspecified type [D64.9]    Pre-op Diagnosis: INPATIENT    COLONOSCOPY    Anesthesia Procedure: COLONOSCOPY    Surgeons and Role:     * Geoffrey Milan MD - Primary    Pre-op vitals reviewed.  Temp: 98.1 °F (36.7 °C)  Pulse: 72  Resp: 17  BP: 156/76  SpO2: 96 %  Body mass index is 34.87 kg/m².    Current medications reviewed.  Hospital Medications:   pantoprazole (Protonix) 40 mg in sodium chloride 0.9% PF 10 mL IV push  40 mg Intravenous Q12H    lactated ringers infusion   Intravenous Continuous    [COMPLETED] polyethylene glycol-electrolyte (Golytely) 236 g oral solution 4,000 mL  4,000 mL Oral Once    [COMPLETED] pantoprazole (Protonix) 40 mg in sodium chloride 0.9% PF 10 mL IV push  40 mg Intravenous Once    atorvastatin (Lipitor) tab 40 mg  40 mg Oral Nightly    sodium chloride 0.9% infusion   Intravenous Continuous    acetaminophen (Tylenol Extra Strength) tab 500 mg  500 mg Oral Q4H PRN    polyethylene glycol (PEG 3350) (Miralax) 17 g oral packet 17 g  17 g Oral Daily PRN    sennosides (Senokot) tab 17.2 mg  17.2 mg Oral Nightly PRN    bisacodyl (Dulcolax) 10 MG rectal suppository 10 mg  10 mg Rectal Daily PRN    fleet enema (Fleet) rectal enema 133 mL  1 enema Rectal Once PRN    ondansetron (Zofran) 4 MG/2ML injection 4 mg  4 mg Intravenous Q6H PRN    prochlorperazine (Compazine) 10 MG/2ML injection 5 mg  5 mg Intravenous Q8H PRN    [COMPLETED] potassium chloride (Klor-Con M20) tab 40 mEq  40 mEq Oral Once       Outpatient Medications:   Prescriptions Prior to Admission[1]    Allergies: Patient has no known allergies.      Anesthesia Evaluation    Patient summary reviewed.    Anesthetic Complications           GI/Hepatic/Renal                                 Cardiovascular                (+) obesity  (+) hypertension                                     Endo/Other      (+) diabetes                             Pulmonary                           Neuro/Psych                                      History reviewed. No pertinent surgical history.  Social History     Socioeconomic History    Marital status: Single   Tobacco Use    Smoking status: Former     Current packs/day: 0.50     Average packs/day: 0.5 packs/day for 30.0 years (15.0 ttl pk-yrs)     Types: Cigarettes    Smokeless tobacco: Never    Tobacco comments:     6-7 cigarettes a day   Vaping Use    Vaping status: Every Day   Substance and Sexual Activity    Alcohol use: Not Currently     History   Drug Use Not on file     Available pre-op labs reviewed.  Lab Results   Component Value Date    WBC 4.6 01/22/2025    RBC 3.10 (L) 01/22/2025    HGB 7.7 (L) 01/22/2025    HGB 7.7 (L) 01/22/2025    HCT 25.4 (L) 01/22/2025    MCV 81.9 01/22/2025    MCH 24.8 (L) 01/22/2025    MCHC 30.3 (L) 01/22/2025    RDW 19.3 01/22/2025    .0 01/22/2025     Lab Results   Component Value Date     01/22/2025    K 4.1 01/22/2025     01/22/2025    CO2 28.0 01/22/2025    BUN 14 01/22/2025    CREATSERUM 0.73 01/22/2025    GLU 86 01/22/2025    CA 9.4 01/22/2025     Lab Results   Component Value Date    INR 1.25 (H) 01/20/2025         Airway      Mallampati: III  Mouth opening: <3 FB  TM distance: < 4 cm  Neck ROM: limited Cardiovascular    Cardiovascular exam normal.  Rhythm: regular  Rate: normal     Dental             Pulmonary    Pulmonary exam normal.                 Other findings              ASA: 3   Plan: MAC  NPO status verified and patient meets guidelines.          Plan/risks discussed with: patient and significant other                Present on Admission:  **None**             [1]   Medications Prior to Admission   Medication Sig Dispense Refill Last Dose/Taking    pantoprazole 40 MG Oral Tab EC Take 1 tablet (40 mg total) by mouth 2 (two) times daily before meals. 60 tablet 1 1/20/2025 Morning    Ferrous Sulfate (IRON) 325 (65 Fe) MG Oral Tab Take by mouth.    2025 Morning    Losartan Potassium-HCTZ 100-25 MG Oral Tab Take 1 tablet by mouth daily. 90 tablet 1 2025 Morning    atorvastatin 40 MG Oral Tab Take 1 tablet (40 mg total) by mouth nightly. 90 tablet 1 2025 Evening    [] sucralfate 1 GM/10ML Oral Suspension Take 10 mL (1 g total) by mouth 4 (four) times daily before meals and nightly (2200) for 10 days. 414 mL 0

## 2025-01-22 NOTE — PROGRESS NOTES
[address] [address]  ?  01/22/2025  ?  Re: Moises Olsen  ?  To Whom It May Concern:    Moises Olsen was admitted to [facility] from 1/20/2025 to 01/22/2025.    Please excuse Moises Olsen from attending work for these days.    The patient may return to work on Sunday, January 26, 2025.    Patient will follow up with their primary care physician upon discharge.     Further restrictions and work excuse will be based on primary care physician's evaluation.  ?  Thank you,    Familia Arias MD, MD  [facility]

## 2025-01-23 ENCOUNTER — TELEPHONE (OUTPATIENT)
Dept: INTERNAL MEDICINE CLINIC | Facility: CLINIC | Age: 65
End: 2025-01-23

## 2025-01-23 RX ORDER — TETRACYCLINE HYDROCHLORIDE 500 MG/1
500 CAPSULE ORAL 4 TIMES DAILY
Qty: 40 CAPSULE | Refills: 0 | Status: SHIPPED | OUTPATIENT
Start: 2025-01-23 | End: 2025-02-02

## 2025-01-23 RX ORDER — METRONIDAZOLE 250 MG/1
250 TABLET ORAL 4 TIMES DAILY
Qty: 40 TABLET | Refills: 0 | Status: SHIPPED | OUTPATIENT
Start: 2025-01-23 | End: 2025-02-02

## 2025-01-23 NOTE — PROGRESS NOTES
NURSING DISCHARGE NOTE    Discharged Home via Wheelchair.  Accompanied by RN  Belongings Taken by patient/family.    Telemetry monitor discontinued. Patient IV discontinued, catheter intact. Discharge instructions reviewed, all questions answered, patient verbalizes understanding.

## 2025-01-23 NOTE — TELEPHONE ENCOUNTER
Called the patient but the patient is not available.  Also tried to call the daughter, she was not available too.  The patient has Helicobacter pylori positivity.  Use triple therapy.  I am sending the medicines to the pharmacy.  Please let the patient know.  The patient can follow-up with me in 1 week to 2 weeks time.    The patient should take bismuth 550 mg 2 times a day, metronidazole to 250 mg 4 times a day,  tetracycline 500 mg 4 times a day and pantoprazole 40 mg twice daily -- for 10 days.

## 2025-01-24 NOTE — TELEPHONE ENCOUNTER
Spoke with patient regarding results and recommendations per Dr. Rader. Patient scheduled for follow-up visit per Dr. Rader's request.    Future Appointments   Date Time Provider Department Center   2/7/2025 10:20 AM Osiel Rader MD [facility]

## 2025-02-07 ENCOUNTER — LAB ENCOUNTER (OUTPATIENT)
Dept: LAB | Age: 65
End: 2025-02-07
Attending: INTERNAL MEDICINE
Payer: COMMERCIAL

## 2025-02-07 DIAGNOSIS — K92.2 GASTROINTESTINAL HEMORRHAGE, UNSPECIFIED GASTROINTESTINAL HEMORRHAGE TYPE: ICD-10-CM

## 2025-02-07 PROBLEM — E66.9 TYPE 2 DIABETES MELLITUS WITH OBESITY (HCC): Status: RESOLVED | Noted: 2019-12-11 | Resolved: 2025-02-07

## 2025-02-07 PROBLEM — E11.69 TYPE 2 DIABETES MELLITUS WITH OBESITY (HCC): Status: RESOLVED | Noted: 2019-12-11 | Resolved: 2025-02-07

## 2025-02-07 LAB
BASOPHILS # BLD AUTO: 0.12 X10(3) UL (ref 0–0.2)
BASOPHILS NFR BLD AUTO: 1.8 %
EOSINOPHIL # BLD AUTO: 0.28 X10(3) UL (ref 0–0.7)
EOSINOPHIL NFR BLD AUTO: 4.3 %
ERYTHROCYTE [DISTWIDTH] IN BLOOD BY AUTOMATED COUNT: 23.3 %
HCT VFR BLD AUTO: 25.5 %
HGB BLD-MCNC: 7.2 G/DL
IMM GRANULOCYTES # BLD AUTO: 0.06 X10(3) UL (ref 0–1)
IMM GRANULOCYTES NFR BLD: 0.9 %
LYMPHOCYTES # BLD AUTO: 2 X10(3) UL (ref 1–4)
LYMPHOCYTES NFR BLD AUTO: 30.7 %
MCH RBC QN AUTO: 22.6 PG (ref 26–34)
MCHC RBC AUTO-ENTMCNC: 28.2 G/DL (ref 31–37)
MCV RBC AUTO: 79.9 FL
MONOCYTES # BLD AUTO: 0.85 X10(3) UL (ref 0.1–1)
MONOCYTES NFR BLD AUTO: 13.1 %
NEUTROPHILS # BLD AUTO: 3.2 X10 (3) UL (ref 1.5–7.7)
NEUTROPHILS # BLD AUTO: 3.2 X10(3) UL (ref 1.5–7.7)
NEUTROPHILS NFR BLD AUTO: 49.2 %
PLATELET # BLD AUTO: 166 10(3)UL (ref 150–450)
PLATELET MORPHOLOGY: NORMAL
PLATELETS.RETICULATED NFR BLD AUTO: 9.6 % (ref 0–7)
RBC # BLD AUTO: 3.19 X10(6)UL
WBC # BLD AUTO: 6.5 X10(3) UL (ref 4–11)

## 2025-02-07 PROCEDURE — 85025 COMPLETE CBC W/AUTO DIFF WBC: CPT

## 2025-02-07 PROCEDURE — 36415 COLL VENOUS BLD VENIPUNCTURE: CPT

## 2025-02-10 ENCOUNTER — TELEPHONE (OUTPATIENT)
Dept: INTERNAL MEDICINE CLINIC | Facility: CLINIC | Age: 65
End: 2025-02-10

## 2025-02-10 ENCOUNTER — LAB ENCOUNTER (OUTPATIENT)
Dept: LAB | Age: 65
End: 2025-02-10
Attending: NURSE PRACTITIONER
Payer: COMMERCIAL

## 2025-02-10 DIAGNOSIS — D50.9 IRON DEFICIENCY ANEMIA, UNSPECIFIED IRON DEFICIENCY ANEMIA TYPE: ICD-10-CM

## 2025-02-10 PROBLEM — E78.00 HIGH BLOOD CHOLESTEROL: Status: ACTIVE | Noted: 2019-12-11

## 2025-02-10 PROBLEM — D64.9 ANEMIA: Status: ACTIVE | Noted: 2019-07-27

## 2025-02-10 PROBLEM — E78.5 DYSLIPIDEMIA: Status: ACTIVE | Noted: 2023-08-09

## 2025-02-10 PROBLEM — I70.90 AS (ATHEROSCLEROSIS): Status: ACTIVE | Noted: 2023-07-20

## 2025-02-10 PROBLEM — R06.09 DYSPNEA ON EXERTION: Status: ACTIVE | Noted: 2023-07-20

## 2025-02-10 PROBLEM — I70.0 ATHEROSCLEROSIS OF ABDOMINAL AORTA: Status: ACTIVE | Noted: 2023-08-09

## 2025-02-10 PROBLEM — R93.1 ELEVATED CORONARY ARTERY CALCIUM SCORE: Status: ACTIVE | Noted: 2023-08-09

## 2025-02-10 PROBLEM — R06.83 SNORING: Status: ACTIVE | Noted: 2023-07-20

## 2025-02-10 PROBLEM — E55.9 VITAMIN D DEFICIENCY: Status: ACTIVE | Noted: 2023-08-09

## 2025-02-10 LAB
BASOPHILS # BLD AUTO: 0.08 X10(3) UL (ref 0–0.2)
BASOPHILS NFR BLD AUTO: 1.4 %
BUN BLD-MCNC: 15 MG/DL (ref 9–23)
DEPRECATED HBV CORE AB SER IA-ACNC: 4 NG/ML
EOSINOPHIL # BLD AUTO: 0.28 X10(3) UL (ref 0–0.7)
EOSINOPHIL NFR BLD AUTO: 5 %
ERYTHROCYTE [DISTWIDTH] IN BLOOD BY AUTOMATED COUNT: 22.5 %
FOLATE SERPL-MCNC: 16.1 NG/ML (ref 5.4–?)
HCT VFR BLD AUTO: 25.6 %
HGB BLD-MCNC: 7.2 G/DL
IMM GRANULOCYTES # BLD AUTO: 0.02 X10(3) UL (ref 0–1)
IMM GRANULOCYTES NFR BLD: 0.4 %
IRON SATN MFR SERPL: 3 %
IRON SERPL-MCNC: 10 UG/DL
LYMPHOCYTES # BLD AUTO: 1.61 X10(3) UL (ref 1–4)
LYMPHOCYTES NFR BLD AUTO: 29 %
MCH RBC QN AUTO: 22 PG (ref 26–34)
MCHC RBC AUTO-ENTMCNC: 28.1 G/DL (ref 31–37)
MCV RBC AUTO: 78 FL
MONOCYTES # BLD AUTO: 0.82 X10(3) UL (ref 0.1–1)
MONOCYTES NFR BLD AUTO: 14.8 %
NEUTROPHILS # BLD AUTO: 2.74 X10 (3) UL (ref 1.5–7.7)
NEUTROPHILS # BLD AUTO: 2.74 X10(3) UL (ref 1.5–7.7)
NEUTROPHILS NFR BLD AUTO: 49.4 %
PLATELET # BLD AUTO: 166 10(3)UL (ref 150–450)
PLATELET MORPHOLOGY: NORMAL
RBC # BLD AUTO: 3.28 X10(6)UL
TOTAL IRON BINDING CAPACITY: 389 UG/DL (ref 250–425)
TRANSFERRIN SERPL-MCNC: 352 MG/DL (ref 215–365)
VIT B12 SERPL-MCNC: 769 PG/ML (ref 211–911)
WBC # BLD AUTO: 5.6 X10(3) UL (ref 4–11)

## 2025-02-10 PROCEDURE — 83550 IRON BINDING TEST: CPT

## 2025-02-10 PROCEDURE — 83540 ASSAY OF IRON: CPT

## 2025-02-10 PROCEDURE — 36415 COLL VENOUS BLD VENIPUNCTURE: CPT

## 2025-02-10 PROCEDURE — 82607 VITAMIN B-12: CPT

## 2025-02-10 PROCEDURE — 85025 COMPLETE CBC W/AUTO DIFF WBC: CPT

## 2025-02-10 PROCEDURE — 82728 ASSAY OF FERRITIN: CPT

## 2025-02-10 PROCEDURE — 84520 ASSAY OF UREA NITROGEN: CPT

## 2025-02-10 PROCEDURE — 82746 ASSAY OF FOLIC ACID SERUM: CPT

## 2025-02-10 NOTE — TELEPHONE ENCOUNTER
I called the patient and conveyed that hemoglobin level has dropped.  The patient is currently asymptomatic.  He is going to follow-up with GI doctor today.  I have advised him to go to emergency room in case if he feels dizzy or his heart races or he passes black tarry stools.

## 2025-04-12 ENCOUNTER — LAB ENCOUNTER (OUTPATIENT)
Dept: LAB | Age: 65
End: 2025-04-12
Attending: INTERNAL MEDICINE
Payer: COMMERCIAL

## 2025-04-12 ENCOUNTER — APPOINTMENT (OUTPATIENT)
Dept: CT IMAGING | Facility: HOSPITAL | Age: 65
End: 2025-04-12
Attending: EMERGENCY MEDICINE
Payer: COMMERCIAL

## 2025-04-12 ENCOUNTER — OFFICE VISIT (OUTPATIENT)
Dept: INTERNAL MEDICINE CLINIC | Facility: CLINIC | Age: 65
End: 2025-04-12
Payer: COMMERCIAL

## 2025-04-12 ENCOUNTER — MOBILE ENCOUNTER (OUTPATIENT)
Dept: INTERNAL MEDICINE CLINIC | Facility: CLINIC | Age: 65
End: 2025-04-12

## 2025-04-12 ENCOUNTER — HOSPITAL ENCOUNTER (INPATIENT)
Facility: HOSPITAL | Age: 65
LOS: 6 days | Discharge: HOME | End: 2025-04-19
Attending: EMERGENCY MEDICINE | Admitting: INTERNAL MEDICINE
Payer: COMMERCIAL

## 2025-04-12 VITALS
HEIGHT: 67 IN | WEIGHT: 233.38 LBS | OXYGEN SATURATION: 97 % | HEART RATE: 83 BPM | DIASTOLIC BLOOD PRESSURE: 62 MMHG | TEMPERATURE: 98 F | SYSTOLIC BLOOD PRESSURE: 130 MMHG | BODY MASS INDEX: 36.63 KG/M2

## 2025-04-12 DIAGNOSIS — D50.0 BLOOD LOSS ANEMIA: Primary | ICD-10-CM

## 2025-04-12 DIAGNOSIS — E66.812 CLASS 2 OBESITY: ICD-10-CM

## 2025-04-12 DIAGNOSIS — R06.02 SHORTNESS OF BREATH: ICD-10-CM

## 2025-04-12 DIAGNOSIS — D50.8 IRON REFRACTORY IRON DEFICIENCY ANEMIA: ICD-10-CM

## 2025-04-12 DIAGNOSIS — Z87.19 HISTORY OF GI BLEED: ICD-10-CM

## 2025-04-12 DIAGNOSIS — G89.18 POSTOPERATIVE PAIN: ICD-10-CM

## 2025-04-12 DIAGNOSIS — D50.9 MICROCYTIC ANEMIA: ICD-10-CM

## 2025-04-12 DIAGNOSIS — R19.5 GUAIAC POSITIVE STOOLS: ICD-10-CM

## 2025-04-12 DIAGNOSIS — D50.0 BLOOD LOSS ANEMIA: ICD-10-CM

## 2025-04-12 DIAGNOSIS — D50.0 IRON DEFICIENCY ANEMIA DUE TO CHRONIC BLOOD LOSS: ICD-10-CM

## 2025-04-12 DIAGNOSIS — K35.80 ACUTE APPENDICITIS, UNSPECIFIED ACUTE APPENDICITIS TYPE: ICD-10-CM

## 2025-04-12 DIAGNOSIS — D64.9 SYMPTOMATIC ANEMIA: Primary | ICD-10-CM

## 2025-04-12 DIAGNOSIS — K35.80 ACUTE APPENDICITIS: ICD-10-CM

## 2025-04-12 LAB
ALBUMIN SERPL-MCNC: 4.6 G/DL (ref 3.2–4.8)
ALBUMIN SERPL-MCNC: 4.6 G/DL (ref 3.2–4.8)
ALBUMIN/GLOB SERPL: 1.9 {RATIO} (ref 1–2)
ALP LIVER SERPL-CCNC: 57 U/L (ref 45–117)
ALP LIVER SERPL-CCNC: 58 U/L (ref 45–117)
ALT SERPL-CCNC: 7 U/L (ref 10–49)
ALT SERPL-CCNC: 8 U/L (ref 10–49)
ANION GAP SERPL CALC-SCNC: 10 MMOL/L (ref 0–18)
ANION GAP SERPL CALC-SCNC: 10 MMOL/L (ref 0–18)
ANTIBODY SCREEN: NEGATIVE
APTT PPP: 34.5 SECONDS (ref 23–36)
AST SERPL-CCNC: 14 U/L (ref ?–34)
AST SERPL-CCNC: 19 U/L (ref ?–34)
BASOPHILS # BLD AUTO: 0.05 X10(3) UL (ref 0–0.2)
BASOPHILS # BLD AUTO: 0.05 X10(3) UL (ref 0–0.2)
BASOPHILS NFR BLD AUTO: 0.9 %
BASOPHILS NFR BLD AUTO: 1 %
BILIRUB DIRECT SERPL-MCNC: 0.2 MG/DL (ref ?–0.3)
BILIRUB SERPL-MCNC: 0.5 MG/DL (ref 0.2–1.1)
BILIRUB SERPL-MCNC: 0.5 MG/DL (ref 0.2–1.1)
BNP SERPL-MCNC: 489 PG/ML (ref ?–100)
BUN BLD-MCNC: 17 MG/DL (ref 9–23)
BUN BLD-MCNC: 17 MG/DL (ref 9–23)
CALCIUM BLD-MCNC: 9.5 MG/DL (ref 8.7–10.6)
CALCIUM BLD-MCNC: 9.8 MG/DL (ref 8.7–10.6)
CHLORIDE SERPL-SCNC: 107 MMOL/L (ref 98–112)
CHLORIDE SERPL-SCNC: 107 MMOL/L (ref 98–112)
CO2 SERPL-SCNC: 26 MMOL/L (ref 21–32)
CO2 SERPL-SCNC: 27 MMOL/L (ref 21–32)
CREAT BLD-MCNC: 0.98 MG/DL (ref 0.7–1.3)
CREAT BLD-MCNC: 1.18 MG/DL (ref 0.7–1.3)
DEPRECATED HBV CORE AB SER IA-ACNC: 2 NG/ML (ref 50–336)
EGFRCR SERPLBLD CKD-EPI 2021: 69 ML/MIN/1.73M2 (ref 60–?)
EGFRCR SERPLBLD CKD-EPI 2021: 86 ML/MIN/1.73M2 (ref 60–?)
EOSINOPHIL # BLD AUTO: 0.18 X10(3) UL (ref 0–0.7)
EOSINOPHIL # BLD AUTO: 0.27 X10(3) UL (ref 0–0.7)
EOSINOPHIL NFR BLD AUTO: 3.6 %
EOSINOPHIL NFR BLD AUTO: 4.6 %
ERYTHROCYTE [DISTWIDTH] IN BLOOD BY AUTOMATED COUNT: 23.3 %
ERYTHROCYTE [DISTWIDTH] IN BLOOD BY AUTOMATED COUNT: 23.6 %
FASTING STATUS PATIENT QL REPORTED: NO
GLOBULIN PLAS-MCNC: 2.4 G/DL (ref 2–3.5)
GLUCOSE BLD-MCNC: 105 MG/DL (ref 70–99)
GLUCOSE BLD-MCNC: 85 MG/DL (ref 70–99)
HCT VFR BLD AUTO: 18.5 % (ref 39–53)
HCT VFR BLD AUTO: 18.6 % (ref 39–53)
HGB BLD-MCNC: 4.6 G/DL (ref 13–17.5)
HGB BLD-MCNC: 4.7 G/DL (ref 13–17.5)
IMM GRANULOCYTES # BLD AUTO: 0.02 X10(3) UL (ref 0–1)
IMM GRANULOCYTES # BLD AUTO: 0.03 X10(3) UL (ref 0–1)
IMM GRANULOCYTES NFR BLD: 0.4 %
IMM GRANULOCYTES NFR BLD: 0.5 %
INR BLD: 1.3 (ref 0.8–1.2)
LYMPHOCYTES # BLD AUTO: 1.17 X10(3) UL (ref 1–4)
LYMPHOCYTES # BLD AUTO: 1.5 X10(3) UL (ref 1–4)
LYMPHOCYTES NFR BLD AUTO: 23.2 %
LYMPHOCYTES NFR BLD AUTO: 25.6 %
MCH RBC QN AUTO: 15.8 PG (ref 26–34)
MCH RBC QN AUTO: 15.9 PG (ref 26–34)
MCHC RBC AUTO-ENTMCNC: 24.7 G/DL (ref 31–37)
MCHC RBC AUTO-ENTMCNC: 25.4 G/DL (ref 31–37)
MCV RBC AUTO: 62.5 FL (ref 80–100)
MCV RBC AUTO: 63.7 FL (ref 80–100)
MONOCYTES # BLD AUTO: 0.63 X10(3) UL (ref 0.1–1)
MONOCYTES # BLD AUTO: 0.83 X10(3) UL (ref 0.1–1)
MONOCYTES NFR BLD AUTO: 12.5 %
MONOCYTES NFR BLD AUTO: 14.1 %
NEUTROPHILS # BLD AUTO: 2.99 X10 (3) UL (ref 1.5–7.7)
NEUTROPHILS # BLD AUTO: 2.99 X10(3) UL (ref 1.5–7.7)
NEUTROPHILS # BLD AUTO: 3.19 X10 (3) UL (ref 1.5–7.7)
NEUTROPHILS # BLD AUTO: 3.19 X10(3) UL (ref 1.5–7.7)
NEUTROPHILS NFR BLD AUTO: 54.3 %
NEUTROPHILS NFR BLD AUTO: 59.3 %
OSMOLALITY SERPL CALC.SUM OF ELEC: 298 MOSM/KG (ref 275–295)
OSMOLALITY SERPL CALC.SUM OF ELEC: 299 MOSM/KG (ref 275–295)
PLATELET # BLD AUTO: 238 10(3)UL (ref 150–450)
PLATELET # BLD AUTO: 255 10(3)UL (ref 150–450)
PLATELET MORPHOLOGY: NORMAL
PLATELETS.RETICULATED NFR BLD AUTO: 5.4 % (ref 0–7)
PLATELETS.RETICULATED NFR BLD AUTO: 5.6 % (ref 0–7)
POTASSIUM SERPL-SCNC: 3.8 MMOL/L (ref 3.5–5.1)
POTASSIUM SERPL-SCNC: 3.8 MMOL/L (ref 3.5–5.1)
PROT SERPL-MCNC: 7 G/DL (ref 5.7–8.2)
PROT SERPL-MCNC: 7.1 G/DL (ref 5.7–8.2)
PROTHROMBIN TIME: 16.4 SECONDS (ref 11.6–14.8)
RBC # BLD AUTO: 2.92 X10(6)UL (ref 4.3–5.7)
RBC # BLD AUTO: 2.96 X10(6)UL (ref 4.3–5.7)
RH BLOOD TYPE: POSITIVE
SODIUM SERPL-SCNC: 143 MMOL/L (ref 136–145)
SODIUM SERPL-SCNC: 144 MMOL/L (ref 136–145)
WBC # BLD AUTO: 5 X10(3) UL (ref 4–11)
WBC # BLD AUTO: 5.9 X10(3) UL (ref 4–11)

## 2025-04-12 PROCEDURE — 80048 BASIC METABOLIC PNL TOTAL CA: CPT

## 2025-04-12 PROCEDURE — 80076 HEPATIC FUNCTION PANEL: CPT

## 2025-04-12 PROCEDURE — 36415 COLL VENOUS BLD VENIPUNCTURE: CPT

## 2025-04-12 PROCEDURE — 82728 ASSAY OF FERRITIN: CPT

## 2025-04-12 PROCEDURE — 30233N1 TRANSFUSION OF NONAUTOLOGOUS RED BLOOD CELLS INTO PERIPHERAL VEIN, PERCUTANEOUS APPROACH: ICD-10-PCS | Performed by: INTERNAL MEDICINE

## 2025-04-12 PROCEDURE — 80053 COMPREHEN METABOLIC PANEL: CPT

## 2025-04-12 PROCEDURE — 74178 CT ABD&PLV WO CNTR FLWD CNTR: CPT | Performed by: EMERGENCY MEDICINE

## 2025-04-12 PROCEDURE — 82248 BILIRUBIN DIRECT: CPT

## 2025-04-12 PROCEDURE — 83036 HEMOGLOBIN GLYCOSYLATED A1C: CPT

## 2025-04-12 PROCEDURE — 83880 ASSAY OF NATRIURETIC PEPTIDE: CPT

## 2025-04-12 PROCEDURE — 85025 COMPLETE CBC W/AUTO DIFF WBC: CPT

## 2025-04-12 PROCEDURE — 99223 1ST HOSP IP/OBS HIGH 75: CPT | Performed by: INTERNAL MEDICINE

## 2025-04-12 NOTE — PROGRESS NOTES
Subjective:   Moises Olsen is a 64 year old male who presents for Follow - Up   The patient has been experiencing shortness of breath with minimal exertion.  If you would walk just 20 yards, he would experience shortness of breath.  He has been using 2-3 pillows recently.  No history of leg swelling.  Currently, the patient is not taking iron pills.    The patient says that he had sleep study test about 3 years ago at [facility] but it was not completed because of the cessation of his insurance.  History/Other:    Chief Complaint Reviewed and Verified  No Further Nursing Notes to   Review  Tobacco Reviewed  Allergies Reviewed  Medications Reviewed    Medical History Reviewed  Surgical History Reviewed  Family History   Reviewed  Social History Reviewed         Tobacco:  Tobacco Use[1]  E-Cigarettes/Vaping       Questions Responses    E-Cigarette Use Current Every Day User          E-Cigarette/Vaping Substances       Questions Responses    Nicotine Yes    THC No    CBD No    Flavoring No          E-Cigarette/Vaping Devices       Questions Responses    Disposable Yes    Pre-filled or Refillable Cartridge No    Refillable Tank No    Pre-filled Pod No    Other Pt states he is trying to quit           Tobacco cessation counseling for 3-10 minutes (add E/M code #22603).      Current Medications[2]      Review of Systems:  Pertinent items are noted in HPI.  A comprehensive review of systems was negative.      Objective:   /62 (BP Location: Right arm, Patient Position: Sitting, Cuff Size: large)   Pulse 83   Temp 97.9 °F (36.6 °C) (Temporal)   Ht 5' 7\" (1.702 m)   Wt 233 lb 6.4 oz (105.9 kg)   SpO2 97%   BMI 36.56 kg/m²  Estimated body mass index is 36.56 kg/m² as calculated from the following:    Height as of this encounter: 5' 7\" (1.702 m).    Weight as of this encounter: 233 lb 6.4 oz (105.9 kg).  General condition: Not in distress.    HEENT: Atraumatic normocephalic  Pale conjunctiva  No  cervical or submandibular lymphadenopathy  Chest: Clear to auscultate  Heart: S1-S2 , early systolic flow murmur in all auscultatory areas  Abdomen: Soft non tender, no palpable organomegaly  Surgical scar in the mid abdomen.  The patient has a history of a skin graft for a finger.  Neurological examination: No facial asymmetry.  No lateralizing neurological signs.  Mental state examination: Good eye to eye contact.  Normal mood and behavior.  Engaged in meaningful conversations.  Extremities:  Stuck on appearance -seborrheic keratosis at right upper arm  Legs: No edema    Assessment & Plan:     #1: Chronic blood loss anemia leading to shortness of breath.  The patient was supposed to have capsule endoscopy but was not done due to insurance problems.  Does not report any active bleeding currently.  No melena.  Check CBC.    #2: Concern for congestive heart failure-obtain BNP-echocardiogram.    #3: Class II obesity-obtain hemoglobin A1c and LFTs.    #4: Erectile dysfunction-Cialis as needed.    #5: Seborrheic keratosis of the left upper arm.  I have advised to take the pictures every 3 months.  If problematic, I will refer the patient to dermatologist.    #6: Vaping of nicotine-much improvement is noted.  The patient was up bit on his journey to quit vaping.    #7: Class II obesity -  Weight Loss Advice :  A) Eat plant based diet and avoid ultra processed and fast foods.  B) Your portions should be a dinner plate. 1/2 should be vegetables, 1/4 lean protein (chicken, fish, turkey. Tofu), and 1/4 can be carbohydrates.   C)  Your main drink should be water rather than soda or alcohol or sweet coffees  D). Please try to exercise ( brisk walking or jogging) at least 30 minutes five times/week; and do muscle strengthening exercises ( lifting the weight, doing push ups or yoga)  twice a week    E). It is very important to get 7-9 hours of sleep per night        No follow-ups on file.    Osiel Rader MD, 4/12/2025, 11:40  AM         [1]   Social History  Tobacco Use   Smoking Status Former    Current packs/day: 0.50    Average packs/day: 0.5 packs/day for 30.0 years (15.0 ttl pk-yrs)    Types: Cigarettes   Smokeless Tobacco Current   Tobacco Comments    Nicotine disposable vape use    [2]   Current Outpatient Medications   Medication Sig Dispense Refill    metoprolol succinate ER 25 MG Oral Tablet 24 Hr TAKE A HALF (1/2) TABLET BY MOUTH DAILY WITH A MEAL      rosuvastatin 40 MG Oral Tab Take 1 tablet (40 mg total) by mouth daily.      losartan 50 MG Oral Tab 1 tablet (50 mg total).      pantoprazole 40 MG Oral Tab EC Take 1 tablet (40 mg total) by mouth every morning before breakfast.      Tadalafil 10 MG Oral Tab Take 1 tablet (10 mg total) by mouth daily as needed for Erectile Dysfunction. 20 tablet 0    Ferrous Sulfate 325 (65 Fe) MG Oral Tab Take 1 tablet (325 mg total) by mouth every other day. 60 tablet 0    Losartan Potassium-HCTZ 100-25 MG Oral Tab Take 1 tablet by mouth daily. 90 tablet 1    atorvastatin 40 MG Oral Tab Take 1 tablet (40 mg total) by mouth nightly. 90 tablet 1

## 2025-04-12 NOTE — PATIENT INSTRUCTIONS
1.  Please get your blood tests done.  The chances that you are anemic is quite high.  I am also sending the lab tests for diabetes, heart failure and liver functions.    2.  Please get the records from [facility] on your sleep studies.  And I will not hesitate to refer you to the [facility] doctor for completion of sleep studies.    3.  For you, I am ordering a heart scan-to the echocardiogram to see the function of the heart muscles.    4.  I congratulate you because you are able to decrease the amount of vaping by 80%.  Please try to make it 100%.    Weight Loss Advice :  A) Eat plant based diet and avoid ultra processed and fast foods.  B) Your portions should be a dinner plate. 1/2 should be vegetables, 1/4 lean protein (chicken, fish, turkey. Tofu), and 1/4 can be carbohydrates.   C)  Your main drink should be water rather than soda or alcohol or sweet coffees  D). Please try to exercise ( brisk walking or jogging) at least 30 minutes five times/week; and do muscle strengthening exercises ( lifting the weight, doing push ups or yoga)  twice a week    E). It is very important to get 7-9 hours of sleep per night

## 2025-04-13 PROBLEM — D50.9 MICROCYTIC ANEMIA: Status: ACTIVE | Noted: 2025-04-13

## 2025-04-13 PROBLEM — K35.80 ACUTE APPENDICITIS, UNSPECIFIED ACUTE APPENDICITIS TYPE: Status: ACTIVE | Noted: 2025-04-13

## 2025-04-13 PROBLEM — R19.5 GUAIAC POSITIVE STOOLS: Status: ACTIVE | Noted: 2025-04-13

## 2025-04-13 PROBLEM — D50.0 IRON DEFICIENCY ANEMIA SECONDARY TO BLOOD LOSS (CHRONIC): Status: ACTIVE | Noted: 2025-04-13

## 2025-04-13 PROBLEM — D50.8 IRON REFRACTORY IRON DEFICIENCY ANEMIA: Status: ACTIVE | Noted: 2025-04-13

## 2025-04-13 PROBLEM — D64.9 SYMPTOMATIC ANEMIA: Status: ACTIVE | Noted: 2025-04-13

## 2025-04-13 LAB
BASOPHILS # BLD AUTO: 0.06 X10(3) UL (ref 0–0.2)
BASOPHILS NFR BLD AUTO: 1.2 %
DEPRECATED HBV CORE AB SER IA-ACNC: 3 NG/ML (ref 50–336)
EOSINOPHIL # BLD AUTO: 0.21 X10(3) UL (ref 0–0.7)
EOSINOPHIL NFR BLD AUTO: 4.2 %
ERYTHROCYTE [DISTWIDTH] IN BLOOD BY AUTOMATED COUNT: 25.9 %
HCT VFR BLD AUTO: 20.2 % (ref 39–53)
HGB BLD-MCNC: 5.4 G/DL (ref 13–17.5)
HGB BLD-MCNC: 7.3 G/DL (ref 13–17.5)
IMM GRANULOCYTES # BLD AUTO: 0.03 X10(3) UL (ref 0–1)
IMM GRANULOCYTES NFR BLD: 0.6 %
IRON SATN MFR SERPL: 2 % (ref 20–50)
IRON SERPL-MCNC: 10 UG/DL (ref 65–175)
LYMPHOCYTES # BLD AUTO: 1.13 X10(3) UL (ref 1–4)
LYMPHOCYTES NFR BLD AUTO: 22.4 %
MCH RBC QN AUTO: 17.4 PG (ref 26–34)
MCHC RBC AUTO-ENTMCNC: 26.7 G/DL (ref 31–37)
MCV RBC AUTO: 65.2 FL (ref 80–100)
MONOCYTES # BLD AUTO: 0.52 X10(3) UL (ref 0.1–1)
MONOCYTES NFR BLD AUTO: 10.3 %
NEUTROPHILS # BLD AUTO: 3.09 X10 (3) UL (ref 1.5–7.7)
NEUTROPHILS # BLD AUTO: 3.09 X10(3) UL (ref 1.5–7.7)
NEUTROPHILS NFR BLD AUTO: 61.3 %
PLATELET # BLD AUTO: 236 10(3)UL (ref 150–450)
PLATELETS.RETICULATED NFR BLD AUTO: 4.8 % (ref 0–7)
RBC # BLD AUTO: 3.1 X10(6)UL (ref 4.3–5.7)
TOTAL IRON BINDING CAPACITY: 455 UG/DL (ref 250–425)
TRANSFERRIN SERPL-MCNC: 369 MG/DL (ref 215–365)
WBC # BLD AUTO: 5 X10(3) UL (ref 4–11)

## 2025-04-13 PROCEDURE — 99223 1ST HOSP IP/OBS HIGH 75: CPT | Performed by: SURGERY

## 2025-04-13 PROCEDURE — 99255 IP/OBS CONSLTJ NEW/EST HI 80: CPT | Performed by: SPECIALIST

## 2025-04-13 PROCEDURE — 99232 SBSQ HOSP IP/OBS MODERATE 35: CPT | Performed by: HOSPITALIST

## 2025-04-13 RX ORDER — SODIUM CHLORIDE 9 MG/ML
INJECTION, SOLUTION INTRAVENOUS CONTINUOUS
Status: DISCONTINUED | OUTPATIENT
Start: 2025-04-13 | End: 2025-04-13

## 2025-04-13 RX ORDER — ACETAMINOPHEN 500 MG
500 TABLET ORAL EVERY 4 HOURS PRN
Status: DISCONTINUED | OUTPATIENT
Start: 2025-04-13 | End: 2025-04-14

## 2025-04-13 RX ORDER — ROSUVASTATIN CALCIUM 20 MG/1
40 TABLET, COATED ORAL DAILY
Status: DISCONTINUED | OUTPATIENT
Start: 2025-04-13 | End: 2025-04-19

## 2025-04-13 RX ORDER — ONDANSETRON 2 MG/ML
4 INJECTION INTRAMUSCULAR; INTRAVENOUS EVERY 6 HOURS PRN
Status: DISCONTINUED | OUTPATIENT
Start: 2025-04-13 | End: 2025-04-14

## 2025-04-13 RX ORDER — METOPROLOL SUCCINATE 25 MG/1
25 TABLET, EXTENDED RELEASE ORAL
Status: DISCONTINUED | OUTPATIENT
Start: 2025-04-13 | End: 2025-04-18

## 2025-04-13 RX ORDER — FOLIC ACID 1 MG/1
1 TABLET ORAL DAILY
Status: DISCONTINUED | OUTPATIENT
Start: 2025-04-13 | End: 2025-04-19

## 2025-04-13 RX ORDER — PANTOPRAZOLE SODIUM 40 MG/1
40 TABLET, DELAYED RELEASE ORAL
Status: DISCONTINUED | OUTPATIENT
Start: 2025-04-13 | End: 2025-04-13

## 2025-04-13 NOTE — H&P
[facility]                                                               History & Physical         Moises Olsen Patient Status:  Emergency    7/15/1960 MRN [id number]   [facility] EMERGENCY DEPARTMENT Attending Tona Choi DO   Hosp Day # 0 PCP Osiel Rader MD     Chief Complaint: Abnormal labs and labs done as outpatient with low hemoglobin and was sent to the emergency room by outpatient physician    History of Present Illness:  Moises Olsen is a 64 year old male admitted with Abnormal labs and labs done as outpatient with low hemoglobin and was sent to the emergency room by outpatient physician.  Patient complains of some exertional shortness of breath and fatigue.  Patient states he followed up with his outpatient physician who did some labs which showed hemoglobin of 4.6 and was sent to the emergency room.  Patient has had similar history in December when he needed blood transfusions.  Patient denies any abdominal pain.  Denies any chest pain or shortness of breath.  Denies any nausea vomiting or diarrhea.  Denies any blood in stools this time.  Denies any fever or chills.      History:  Past Medical History[1]  Past medical history of anemia, diabetes, hypertension, GI bleed, hyperlipidemia  Past Surgical History[2]  Past surgical history of colonoscopy  Family history:  Family History[3]   None    Social history:   reports that he has quit smoking. His smoking use included cigarettes. He has a 15 pack-year smoking history. He uses smokeless tobacco. He reports that he does not currently use alcohol. He reports that he does not use drugs.    Allergies:  Allergies[4]    Home Medications:  Prescriptions Prior to Admission[5]    Review of Systems:  A comprehensive 14 point review of systems was completed.  Pertinent positives and negatives noted in the the HPI.    Physical Exam:     Vital signs: Blood pressure 119/61, pulse 74, temperature 98.1 °F (36.7 °C), temperature source Temporal,  resp. rate 18, height 5' 7\" (1.702 m), weight 233 lb (105.7 kg), SpO2 100%.  General: No acute distress.   HEENT: Moist mucous membranes.  Pallor present  Respiratory: Clear to auscultation bilaterally.  No wheezes. No rhonchi.  Cardiovascular: S1, S2.  Regular rate and rhythm.    Abdomen: Soft, nontender, nondistended.  Positive bowel sounds.   Neurologic: Awake alert, no focal neurological deficits.  Musculoskeletal: Full range of motion of all extremities.  No pedal edema or calf tenderness  Integument: Pallor present  Psychiatric: Appropriate mood and affect.      Diagnostic Data:      Laboratory Data:   Lab Results   Component Value Date    WBC 5.0 04/12/2025    HGB 4.7 04/12/2025    HCT 18.5 04/12/2025    .0 04/12/2025    CREATSERUM 1.18 04/12/2025    BUN 17 04/12/2025     04/12/2025    K 3.8 04/12/2025     04/12/2025    CO2 26.0 04/12/2025     04/12/2025    CA 9.5 04/12/2025    ALB 4.6 04/12/2025    ALKPHO 58 04/12/2025    BILT 0.5 04/12/2025    TP 7.0 04/12/2025    AST 19 04/12/2025    ALT 7 04/12/2025    PTT 34.5 04/12/2025    INR 1.30 04/12/2025    PTP 16.4 04/12/2025       Recent Labs   Lab 04/12/25  2204   PTP 16.4*   INR 1.30*       Imaging:  Imaging data reviewed in Epic.  Patient had a CT abdomen and pelvis done in ER results as below  CONCLUSION:       1.  Findings consistent with acute appendicitis without abscess    2. Diverticulosis of the sigmoid colon without diverticulitis.     ASSESSMENT / PLAN:     #Symptomatic microcytic anemia with a hemoglobin of 4.7  Will transfuse with PRBC  Check iron studies  GI consult  Hematology consult with recurrent symptomatic anemia  PPI  #Acute appendicitis seen on CT abdomen  Surgeon consulted from ER  Started on IV Zosyn from the emergency room which we will continue at this time  Will keep n.p.o., on IV fluids, IV Zofran as needed nausea vomiting, IV pain medications as needed  #Hypertension  Continue home metoprolol  Hold home  losartan hydrochlorothiazide at this time  #Prediabetes/mild diabetes mellitus type 2  Hyperglycemia protocol  Follow Elliot  On chart review last hemoglobin A1c done on 1/17/2025 was 5.7, before that had a hemoglobin A1c on 12/16/2019 which was 6.1 at that time.  #Hyperlipidemia  Continue home statin    Quality:  DVT Prophylaxis: SCD, no pharmacological prophylaxis due to severe anemia, possibility of occult GI bleed  CODE status:   Code Status: Not on file  Sutton: No urinary catheter in place      Plan of care discussed with patient      Discussed with ER Physician.  My colleague Dr. Morrissey will follow from morning tomorrow        Greta Howard MD  4/12/2025  11:50 PM         [1]   Past Medical History:   Anemia    Black stools    Blood in the stool    Diabetes (HCC)    Dizziness    Essential hypertension    Food intolerance    GI bleed    History of blood transfusion    Hyperlipidemia    Wears glasses   [2]   Past Surgical History:  Procedure Laterality Date    Colonoscopy N/A 1/22/2025    Procedure: COLONOSCOPY with cold snare polypectomy;  Surgeon: Geoffrey Milan MD;  Location:  ENDOSCOPY   [3] History reviewed. No pertinent family history.  [4] No Known Allergies  [5] (Not in a hospital admission)

## 2025-04-13 NOTE — PLAN OF CARE
NURSING ADMISSION NOTE      Patient admitted via Cart.  Oriented to room.  Received patient with ongoing blood transfusion.   Vital signs taken and recorded.   Afebrile.  No shortness of breath noted, on room air.  Still with dizziness.  Denies any pain.  No active bleeding noted.  Safety precautions initiated.  Bed in low position.  Call light in reach.  All needs attended.  Continue monitor.  0504 O2 sat drops to 85 when sleeping, hooked to O2 at 2L/NC,   Updated MD for 2nd unit of blood transfusion.  Started 2nd unit of blood  transfusion.   Endorsed.

## 2025-04-13 NOTE — PLAN OF CARE
Pt received alert and oriented x4, denies pain. Completed 3rd unit of blood, Hgb up to 7.3. Clear liquid diet initiated, tolerating well. VSS, afebrile. All meds per MAR. Possible capsule endoscopy per GI. Fall precautions in place, call light within reach.       Problem: HEMATOLOGIC - ADULT  Goal: Maintains hematologic stability  Description: INTERVENTIONS- Assess for signs and symptoms of bleeding or hemorrhage- Monitor labs and vital signs for trends- Administer supportive blood products/factors, fluids and medications as ordered and appropriate- Administer supportive blood products/factors as ordered and appropriate  Outcome: Progressing

## 2025-04-13 NOTE — ED QUICK NOTES
Orders for admission, patient is aware of plan and ready to go upstairs. Any questions, please call ED RN Anaya at extension [phone].     Patient Covid vaccination status: Fully vaccinated     COVID Test Ordered in ED: None    COVID Suspicion at Admission: N/A    Running Infusions: Medication Infusions[1] None    Mental Status/LOC at time of transport: A&Ox4    Other pertinent information:   CIWA score: N/A   NIH score:  N/A             [1]

## 2025-04-13 NOTE — PROGRESS NOTES
[facility]   part of [facility]     Hospitalist Progress Note     Moises Olsen Patient Status:  Inpatient    7/15/1960 MRN [id number]   Location [facility] 4NW-A Attending Lili Morrissey MD   Hosp Day # 0 PCP Osiel Rader MD     Chief Complaint:dizziness    Subjective:     Patient denies cp sob abd pain dizziness    Objective:    Review of Systems:   A comprehensive review of systems was completed; pertinent positive and negatives stated in subjective.    Vital signs:  Temp:  [97.8 °F (36.6 °C)-98.4 °F (36.9 °C)] 97.8 °F (36.6 °C)  Pulse:  [63-83] 65  Resp:  [14-25] 19  BP: (119-152)/(51-71) 135/63  SpO2:  [97 %-100 %] 100 %    Physical Exam:    General: No acute distress  Respiratory: No wheezes, no rhonchi  Cardiovascular: S1, S2, regular rate and rhythm  Abdomen: Soft, Non-tender, non-distended, positive bowel sounds  Neuro: No new focal deficits.   Extremities: No edema      Diagnostic Data:    Labs:  Recent Labs   Lab 25  1210 254 25  0444   WBC 5.9 5.0 5.0   HGB 4.6* 4.7* 5.4*   MCV 63.7* 62.5* 65.2*   .0 238.0 236.0   INR  --  1.30*  --        Recent Labs   Lab 25  1210 25  2204   GLU 85 105*   BUN 17 17   CREATSERUM 0.98 1.18   CA 9.8 9.5   ALB 4.6 4.6    143   K 3.8 3.8    107   CO2 27.0 26.0   ALKPHO 57 58   AST 14 19   ALT 8* 7*   BILT 0.5 0.5   TP 7.1 7.0       Estimated Glomerular Filtration Rate: 69 mL/min/1.73m2 (result from lab).    No results for input(s): \"TROP\", \"TROPHS\", \"CK\" in the last 168 hours.    Recent Labs   Lab 25  2204   PTP 16.4*   INR 1.30*                  Microbiology    No results found for this visit on 25.      Imaging: Reviewed in Epic.    Medications: Scheduled Medications[1]    Assessment & Plan:      #Symptomatic microcytic anemia with a hemoglobin of 4.7  Transfused 3 units hb 7.3   GI consult appreciated  SBC in am  Hematology consult with recurrent symptomatic anemia  PPI  #Acute appendicitis  seen on CT abdomen     IV Zosyn  cld, on IV fluids, IV Zofran as needed nausea vomiting,   No abd pain  #Hypertension  Continue home metoprolol  Hold home losartan hydrochlorothiazide   #Prediabetes/mild diabetes mellitus type 2  Hyperglycemia protocol  Follow Accu-Cheks  On chart review last hemoglobin A1c done on 1/17/2025 was 5.7, before that had a hemoglobin A1c on 12/16/2019 which was 6.1 at that time.  #Hyperlipidemia  Continue home statin     Quality:  DVT Prophylaxis: SCD, no pharmacological prophylaxis due to severe anemia, possibility of occult GI bleed  CODE status:   Code Status: Not on file  Sutton: No urinary catheter in place      Lili Morrissey MD    Supplementary Documentation:     Quality:  DVT Mechanical Prophylaxis:   SCDs,    DVT Pharmacologic Prophylaxis   Medication   None                Code Status: Not on file  Sutton: No urinary catheter in place  Sutton Duration (in days):   Central line:    CA:     Discharge is dependent on: progress  At this point Mr. Olsen is expected to be discharge to: home    The 21st Century Cures Act makes medical notes like these available to patients in the interest of transparency. Please be advised this is a medical document. Medical documents are intended to carry relevant information, facts as evident, and the clinical opinion of the practitioner. The medical note is intended as peer to peer communication and may appear blunt or direct. It is written in medical language and may contain abbreviations or verbiage that are unfamiliar.                         [1]    metoprolol succinate ER  25 mg Oral Daily Beta Blocker    rosuvastatin  40 mg Oral Daily

## 2025-04-13 NOTE — CONSULTS
[facility]  Report of Consultation    Moises Olsen Patient Status:  Inpatient    7/15/1960 MRN [id number]   Location [facility] 4NW-A Attending Lili Morrissey MD   Hosp Day # 0 PCP Osiel Rader MD     Requesting Physician:  Dr. Choi    Reason for Consultation:  Acute appendicitis    Chief Complaint:  Shortness of breath    History of Present Illness:  Moises Olsen is a 64 year old male who presents to [facility] on 2025 with complaints of shortness of breath over the past 2 weeks.  Patient was sent to emergency department for evaluation after outpatient lab work revealed hemoglobin level of 4.6.  Patient states low hemoglobin has been an ongoing issue with him secondary to bleeding duodenal ulcers, he has required multiple transfusions in the past.  He states his last transfusion was in 2024.  Patient states he previously completed treatment for H. pylori in February of this year, and he has been taking daily PPI.  He states that since then he has no longer been experiencing blood in his stool. Patient was recommended for capsule endoscopy, has not yet completed.  Patient's last colonoscopy completed 2025.    The states he has been having normal bowel movements, he denies diarrhea or constipation.  He denies any abdominal pain.  Patient states he does feel bloated today.  He states he has been tolerating foods, denies any nausea or vomiting.  He denies fever or chills.    Upon presentation to the hospital, patient was afebrile and hemodynamically stable.  CBC demonstrating hemoglobin 4.7, HCT 18.5, ,000. RBC 2.96, MCH 15.9, MCHC 25.4, MCV 62.5.  There is no leukocytosis.  BMP within normal limits.  CT A/P revealing enlarged appendix, with periappendiceal inflammatory changes enhancement of mucosa.  Measures up to 11 mm in size, consistent with acute appendicitis.  There is no abscess or free air.  Evidence of diverticular disease in the sigmoid colon, without  diverticulitis, no abscess or free air.    Past medical history significant for recurrent duodenal ulcer disease, HTN, dyslipidemia.    Patient denies history of intra-abdominal surgeries.  Patient reports distant history of abdominal skin graft used for repair of finger laceration.    Family history is none.    Patient denies use of oral anticoagulation.    History:  Past Medical History[1]  Past Surgical History[2]  Family History[3]   reports that he has quit smoking. His smoking use included cigarettes. He has a 15 pack-year smoking history. He uses smokeless tobacco. He reports that he does not currently use alcohol. He reports that he does not use drugs.    Allergies:  Allergies[4]    Medications:  Prescriptions Prior to Admission[5]    Current Hospital Medications[6]    Review of Systems:  Review of Systems   Constitutional:  Negative for chills and fever.   HENT:  Negative for congestion and sore throat.    Respiratory:  Positive for shortness of breath. Negative for chest tightness.    Cardiovascular:  Positive for leg swelling. Negative for chest pain.   Gastrointestinal:  Positive for abdominal distention. Negative for nausea, vomiting, abdominal pain, diarrhea and constipation.   Endocrine: Negative.    Genitourinary:  Negative for frequency and hematuria.   Musculoskeletal: Negative.    Allergic/Immunologic: Negative.    Neurological:  Positive for weakness. Negative for headaches.   Psychiatric/Behavioral:  Negative for confusion and agitation.        Physical Exam:  /71 (BP Location: Right arm)   Pulse 67   Temp 98.2 °F (36.8 °C) (Oral)   Resp 17   Ht 67\"   Wt 233 lb 0.4 oz (105.7 kg)   SpO2 100%   BMI 36.50 kg/m²   Physical Exam  Vitals and nursing note reviewed.   Constitutional:       Appearance: Normal appearance.   HENT:      Head: Normocephalic and atraumatic.      Nose: No congestion.   Eyes:      General: No scleral icterus.     Extraocular Movements: Extraocular movements intact.    Cardiovascular:      Rate and Rhythm: Normal rate.   Pulmonary:      Effort: Pulmonary effort is normal. No respiratory distress.   Abdominal:      General: There is distension.      Tenderness: There is no abdominal tenderness. There is no guarding or rebound.   Musculoskeletal:         General: No tenderness.      Cervical back: Normal range of motion.      Right lower leg: No edema.      Left lower leg: No edema.   Skin:     General: Skin is warm and dry.      Capillary Refill: Capillary refill takes less than 2 seconds.      Coloration: Skin is not jaundiced.      Findings: No bruising.   Neurological:      General: No focal deficit present.      Mental Status: He is alert and oriented to person, place, and time.   Psychiatric:         Mood and Affect: Mood normal.         Behavior: Behavior normal.         Laboratory Data:  Recent Labs   Lab 04/12/25  1210 04/12/25 2204 04/13/25  0444   RBC 2.92* 2.96* 3.10*   HGB 4.6* 4.7* 5.4*   HCT 18.6* 18.5* 20.2*   MCV 63.7* 62.5* 65.2*   MCH 15.8* 15.9* 17.4*   MCHC 24.7* 25.4* 26.7*   RDW 23.6 23.3 25.9   NEPRELIM 3.19 2.99 3.09   WBC 5.9 5.0 5.0   .0 238.0 236.0       Recent Labs   Lab 04/12/25  1210 04/12/25  2204   GLU 85 105*   BUN 17 17   CREATSERUM 0.98 1.18   CA 9.8 9.5   ALB 4.6 4.6    143   K 3.8 3.8    107   CO2 27.0 26.0   ALKPHO 57 58   AST 14 19   ALT 8* 7*   BILT 0.5 0.5   TP 7.1 7.0         Recent Labs   Lab 04/12/25  2204   PTP 16.4*   INR 1.30*   PTT 34.5         CT ABDOMEN+PELVIS(ALL W+WO)(CPT=74178)  Result Date: 4/12/2025  CONCLUSION:   1.  Findings consistent with acute appendicitis without abscess findings were discussed with Dr. Choi  2. Diverticulosis of the sigmoid colon without diverticulitis.   LOCATION:  [facility]   Dictated by (CST): Jewel Barrios MD on 4/12/2025 at 11:41 PM     Finalized by (CST): Jewel Barrios MD on 4/12/2025 at 11:45 PM             Medical Decision Making         Impression:  Problem List[7]    Acute  Appendicitis  Acute on chronic blood loss anemia    Plan:  Patient was discussed with Dr. Dela Cruz, who recommends delay of surgical intervention at this time to prioritize optimization of patient status. Ultimately patient will benefit from surgical intervention with laparoscopic appendectomy once blood counts stabilize, pending patient's clinical condition.  NPO, okay for ice chips, sips with meds  Continue IV antibiotics    Jinny Fraga PA-C  4/13/2025  9:44 AM      Is this a shared or split note between Advanced Practice Provider and Physician? Yes     Addendum:    The patient was independently examined. I agree with the PA's assessment and plan.     Patient presented with likely acute blood loss anemia and concurrent findings concerning for acute appendicitis.    Due to to profound anemia with a hemoglobin approximately 5 this morning, surgical invention will be deferred until the patient can be appropriately resuscitated and packed red blood cell transfusion completed.    GI workup will require capsule endoscopy per Dr. Milan.    Outpatient iron therapy for hematology.    Will defer surgical intervention until tomorrow to allow for medical optimization, blood transfusion, and resuscitation.    N.p.o., IV fluids, and IV antibiotics today.    Care plan discussed with patient.  All questions answered.    Patient will be scheduled for surgical intervention tomorrow by the acute care service, Dr. Mcdonnell.    More than 50% of clinical time and 100% MDM was performed by me.     I, Dr. Charlie Dela Cruz, personally performed the services described in this documentation, as documented  by CASANDRA Hoffman,  and they are both accurate and complete.      Charlie Dela Cruz MD FACS  Trauma Medical Director, [facility] General Surgery    The 21st Century Cures Act makes medical notes like these available to patients in the interest of transparency. Please be advised this is a medical document. Medical documents are  intended to carry relevant information, facts as evident, and the clinical opinion of the practitioner. The medical note is intended as peer to peer communication and may appear blunt or direct. It is written in medical language and may contain abbreviations or verbiage that are unfamiliar.    This note was prepared using Dragon Medical voice recognition dictation software. As a result, errors may occur. When identified, these errors have been corrected. While every attempt is made to correct errors during dictation, discrepancies may still exist.                              [1]   Past Medical History:   Anemia    Black stools    Blood in the stool    Diabetes (HCC)    Dizziness    Essential hypertension    Food intolerance    GI bleed    History of blood transfusion    Hyperlipidemia    Wears glasses   [2]   Past Surgical History:  Procedure Laterality Date    Colonoscopy N/A 2025    Procedure: COLONOSCOPY with cold snare polypectomy;  Surgeon: Geoffrey Milan MD;  Location:  ENDOSCOPY   [3] History reviewed. No pertinent family history.  [4] No Known Allergies  [5]   Medications Prior to Admission   Medication Sig    rosuvastatin 40 MG Oral Tab Take 1 tablet (40 mg total) by mouth in the morning.    pantoprazole 40 MG Oral Tab EC Take 1 tablet (40 mg total) by mouth every morning before breakfast.    Losartan Potassium-HCTZ 100-25 MG Oral Tab Take 1 tablet by mouth daily.    metoprolol succinate ER 25 MG Oral Tablet 24 Hr TAKE A HALF (1/2) TABLET BY MOUTH DAILY WITH A MEAL    losartan 50 MG Oral Tab 1 tablet (50 mg total).    Tadalafil 10 MG Oral Tab Take 1 tablet (10 mg total) by mouth daily as needed for Erectile Dysfunction.    Ferrous Sulfate 325 (65 Fe) MG Oral Tab Take 1 tablet (325 mg total) by mouth every other day.    [] Bismuth Subsalicylate 525 MG Oral Tab Take 1 tablet by mouth in the morning and 1 tablet before bedtime. Do all this for 10 days.    [] Tetracycline HCl 500 MG  Oral Cap Take 1 capsule (500 mg total) by mouth 4 (four) times daily for 10 days.    [] metroNIDAZOLE 250 MG Oral Tab Take 1 tablet (250 mg total) by mouth 4 (four) times daily for 10 days.    atorvastatin 40 MG Oral Tab Take 1 tablet (40 mg total) by mouth nightly.   [6]   Current Facility-Administered Medications:     acetaminophen (Tylenol Extra Strength) tab 500 mg, 500 mg, Oral, Q4H PRN    ondansetron (Zofran) 4 MG/2ML injection 4 mg, 4 mg, Intravenous, Q6H PRN    metoprolol succinate ER (Toprol XL) 24 hr tab 25 mg, 25 mg, Oral, Daily Beta Blocker    rosuvastatin (Crestor) tab 40 mg, 40 mg, Oral, Daily    sodium ferric gluconate (Ferrlecit) 125 mg in sodium chloride 0.9% 100mL IVPB premix, 125 mg, Intravenous, Once  [7]   Patient Active Problem List  Diagnosis    Gastrointestinal hemorrhage    Gastrointestinal hemorrhage, unspecified gastrointestinal hemorrhage type    Iron deficiency anemia due to chronic blood loss    Renal insufficiency    Accelerated hypertension    High blood cholesterol    History of GI bleed    Upper GI bleed    Hypertension    Undiagnosed cardiac murmurs    Melena    AS (atherosclerosis)    Atherosclerosis of abdominal aorta    Dyslipidemia    Dyspnea on exertion    Elevated coronary artery calcium score    Snoring    Vitamin D deficiency    Symptomatic anemia    Microcytic anemia    Guaiac positive stools    Acute appendicitis, unspecified acute appendicitis type

## 2025-04-13 NOTE — PROGRESS NOTES
Received page from lab.  Critical hemoglobin of 4.6.  Called the number listed for patient but there was no answer.  Called emergency contact listed, daughter Evelyn Olsen.  She was informed of the critical lab value and the need for the patient to go to the emergency room.  She voiced understanding and says that she will let pt know asap. <<----- Click to add NO pertinent Family History

## 2025-04-13 NOTE — PROGRESS NOTES
Tried calling patient again regarding critical hemoglobin level.  This time was able to reach patient.  He was informed that his hemoglobin is severely low and needs to go to the emergency room. Reviewed how there is a risk of death if he does not seek urgent evaluation for a hemoglobin of 4.6 (especially if it drops further given his hx). Patient voiced understanding and said he would go to ER.

## 2025-04-13 NOTE — ED PROVIDER NOTES
Patient Seen in: [facility] Emergency Department    History     Chief Complaint   Patient presents with    Abnormal Labs     Stated Complaint: was called to come get blood transfusion; hgb 4.6    Subjective:   Patient is 64-year-old male who presents emergency room for low hemoglobin.  Patient has seen his doctor for shortness of breath and fatigue.  Patient had outpatient labs that showed hemoglobin of 4.6.  Patient has required blood transfusion back in December for similar episode.  He had bleeding from his small intestine at that time.  Patient reports no bloody stools no black stool since that time however on guaiac exam patient is guaiac positive.  He has not any blood thinners.  Patient does not take Motrin or any other NSAIDs.    The history is provided by the patient.             Objective:     Past Medical History:    Anemia    Black stools    Blood in the stool    Diabetes (HCC)    Dizziness    Essential hypertension    Food intolerance    GI bleed    History of blood transfusion    Hyperlipidemia    Wears glasses              Past Surgical History:   Procedure Laterality Date    Colonoscopy N/A 1/22/2025    Procedure: COLONOSCOPY with cold snare polypectomy;  Surgeon: Geoffrey Milan MD;  Location:  ENDOSCOPY                Social History     Socioeconomic History    Marital status: Single   Tobacco Use    Smoking status: Former     Current packs/day: 0.50     Average packs/day: 0.5 packs/day for 30.0 years (15.0 ttl pk-yrs)     Types: Cigarettes    Smokeless tobacco: Current    Tobacco comments:     Nicotine disposable vape use    Vaping Use    Vaping status: Every Day    Substances: Nicotine    Devices: Disposable, Pt states he is trying to quit   Substance and Sexual Activity    Alcohol use: Not Currently    Drug use: Never     Social Drivers of Health     Food Insecurity: No Food Insecurity (1/20/2025)    Food Insecurity     Food Insecurity: Never true   Transportation Needs: No  Transportation Needs (1/20/2025)    Transportation Needs     Lack of Transportation: No   Housing Stability: Low Risk  (1/20/2025)    Housing Stability     Housing Instability: No                  Physical Exam     ED Triage Vitals [04/12/25 2143]   /69   Pulse 80   Resp 20   Temp 98.1 °F (36.7 °C)   Temp src Temporal   SpO2 98 %   O2 Device None (Room air)       Current Vitals:   Vital Signs  BP: 152/68  Pulse: 73  Resp: 17  Temp: 98.1 °F (36.7 °C)  Temp src: Temporal  MAP (mmHg): 88    Oxygen Therapy  SpO2: 100 %  O2 Device: None (Room air)        Physical Exam  Vitals and nursing note reviewed. Exam conducted with a chaperone present.   Constitutional:       General: He is not in acute distress.     Appearance: Normal appearance. He is obese. He is not ill-appearing, toxic-appearing or diaphoretic.   HENT:      Head: Normocephalic and atraumatic.      Mouth/Throat:      Mouth: Mucous membranes are moist.   Eyes:      Extraocular Movements: Extraocular movements intact.      Pupils: Pupils are equal, round, and reactive to light.      Comments: Pale conjunctiva   Cardiovascular:      Rate and Rhythm: Normal rate and regular rhythm.      Pulses: Normal pulses.   Pulmonary:      Effort: Pulmonary effort is normal. No respiratory distress.      Breath sounds: Normal breath sounds. No wheezing.   Abdominal:      General: Bowel sounds are normal. There is no distension.      Palpations: Abdomen is soft.      Tenderness: There is no abdominal tenderness.   Genitourinary:     Rectum: Guaiac result positive.   Musculoskeletal:         General: Normal range of motion.   Skin:     General: Skin is warm.      Capillary Refill: Capillary refill takes less than 2 seconds.      Coloration: Skin is pale.   Neurological:      General: No focal deficit present.      Mental Status: He is alert and oriented to person, place, and time.   Psychiatric:         Mood and Affect: Mood normal.         Behavior: Behavior normal.              ED Course     Labs Reviewed   COMP METABOLIC PANEL (14) - Abnormal; Notable for the following components:       Result Value    Glucose 105 (*)     Calculated Osmolality 298 (*)     ALT 7 (*)     All other components within normal limits   CBC WITH DIFFERENTIAL WITH PLATELET - Abnormal; Notable for the following components:    RBC 2.96 (*)     HGB 4.7 (*)     HCT 18.5 (*)     MCV 62.5 (*)     MCH 15.9 (*)     MCHC 25.4 (*)     All other components within normal limits   PROTHROMBIN TIME (PT) - Abnormal; Notable for the following components:    PT 16.4 (*)     INR 1.30 (*)     All other components within normal limits   PTT, ACTIVATED - Normal   TYPE AND SCREEN    Narrative:     The following orders were created for panel order Type and screen.  Procedure                               Abnormality         Status                     ---------                               -----------         ------                     ABORH (Blood Type)[[id number]]                               Final result               Antibody Screen[[id number]]                                  Final result                 Please view results for these tests on the individual orders.   ABORH (BLOOD TYPE)   ANTIBODY SCREEN   PREPARE RBC            CT ABDOMEN+PELVIS(ALL W+WO)(CPT=74178)  Result Date: 4/12/2025  PROCEDURE:  CT ABDOMEN+PELVIS (ALL W+WO) (CPT=74178)  COMPARISON:  None.  INDICATIONS:  was called to come get blood transfusion; hgb 4.6 history of rectal bleeing  TECHNIQUE:  Noncontrast scanning through the abdomen and pelvis was performed from the dome of the diaphragm to the pubic symphysis followed by IV contrast-enhanced multislice CT scanning through the abdomen and pelvis using nonionic contrast.  Post contrast coronal MPR imaging was performed.  Dose reduction techniques were used. Dose information is transmitted to the ACR (American College of Radiology) NRDR (National Radiology Data Registry) which includes the Dose Index  Registry.  PATIENT STATED HISTORY:(As transcribed by Technologist)  Was called to come get blood transfusion; hgb 4.6 history of rectal bleeing   CONTRAST USED:  100cc of Isovue 370  FINDINGS:  LIVER:  No enlargement, atrophy, abnormal density, or significant focal lesion.  BILIARY:  No visible dilatation or calcification.  PANCREAS:  No lesion, fluid collection, ductal dilatation, or atrophy.  SPLEEN:  No enlargement or focal lesion.  KIDNEYS:  No mass, obstruction, or calcification.  There are small cysts measuring under 1 cm bilaterally. ADRENALS:  No mass or enlargement.  AORTA/VASCULAR:  No aneurysm or dissection.  RETROPERITONEUM:  No mass or adenopathy.  There are small lymph nodes within the retroperitoneum which are likely reactive. BOWEL/MESENTERY:  The appendix is enlarged with Trena appendiceal inflammatory change and enhancement of the mucosa.  The appendix measures up to 11 mm in size consistent with acute appendicitis.  There is no abscess or free air.  There is diverticular disease of the sigmoid colon without diverticulitis.  There is no abscess or free air.  The visualized small bowel is unremarkable. ABDOMINAL WALL:  No mass or hernia.  URINARY BLADDER:  No visible focal wall thickening, lesion, or calculus.  PELVIC NODES:  No adenopathy.  PELVIC ORGANS:  No visible mass.  Pelvic organs appropriate for patient age.  BONES:  No bony lesion or fracture.  LUNG BASES:  No visible pulmonary or pleural disease.  OTHER:  Negative.             CONCLUSION:   1.  Findings consistent with acute appendicitis without abscess findings were discussed with Dr. Choi  2. Diverticulosis of the sigmoid colon without diverticulitis.   LOCATION:  [facility]   Dictated by (CST): Jewel Barrios MD on 4/12/2025 at 11:41 PM     Finalized by (CST): Jewel Barrios MD on 4/12/2025 at 11:45 PM                            MDM      Social -positive vaping tobacco, negative etoh, negative drugs  Family History-noncontributory  Past  Medical History-anemia, hypertension, hyperlipidemia, diabetes    Differential diagnosis before testing included rectal bleeding, anemia, lab error    Co-morbidities that add to the complexity of management include: Recent need for transfusion in December    Testing ordered during this visit included baseline labs type and screen CT scan of the abdomen    Radiographic images  I personally reviewed the radiographs and my individual interpretation shows acute appendicitis  I also reviewed the official reports that showed CT ABDOMEN+PELVIS(ALL W+WO)(CPT=74178)  Result Date: 4/12/2025  PROCEDURE:  CT ABDOMEN+PELVIS (ALL W+WO) (CPT=74178)  COMPARISON:  None.  INDICATIONS:  was called to come get blood transfusion; hgb 4.6 history of rectal bleeing  TECHNIQUE:  Noncontrast scanning through the abdomen and pelvis was performed from the dome of the diaphragm to the pubic symphysis followed by IV contrast-enhanced multislice CT scanning through the abdomen and pelvis using nonionic contrast.  Post contrast coronal MPR imaging was performed.  Dose reduction techniques were used. Dose information is transmitted to the ACR (American College of Radiology) NRDR (National Radiology Data Registry) which includes the Dose Index Registry.  PATIENT STATED HISTORY:(As transcribed by Technologist)  Was called to come get blood transfusion; hgb 4.6 history of rectal bleeing   CONTRAST USED:  100cc of Isovue 370  FINDINGS:  LIVER:  No enlargement, atrophy, abnormal density, or significant focal lesion.  BILIARY:  No visible dilatation or calcification.  PANCREAS:  No lesion, fluid collection, ductal dilatation, or atrophy.  SPLEEN:  No enlargement or focal lesion.  KIDNEYS:  No mass, obstruction, or calcification.  There are small cysts measuring under 1 cm bilaterally. ADRENALS:  No mass or enlargement.  AORTA/VASCULAR:  No aneurysm or dissection.  RETROPERITONEUM:  No mass or adenopathy.  There are small lymph nodes within the  retroperitoneum which are likely reactive. BOWEL/MESENTERY:  The appendix is enlarged with Trena appendiceal inflammatory change and enhancement of the mucosa.  The appendix measures up to 11 mm in size consistent with acute appendicitis.  There is no abscess or free air.  There is diverticular disease of the sigmoid colon without diverticulitis.  There is no abscess or free air.  The visualized small bowel is unremarkable. ABDOMINAL WALL:  No mass or hernia.  URINARY BLADDER:  No visible focal wall thickening, lesion, or calculus.  PELVIC NODES:  No adenopathy.  PELVIC ORGANS:  No visible mass.  Pelvic organs appropriate for patient age.  BONES:  No bony lesion or fracture.  LUNG BASES:  No visible pulmonary or pleural disease.  OTHER:  Negative.             CONCLUSION:   1.  Findings consistent with acute appendicitis without abscess findings were discussed with Dr. Choi  2. Diverticulosis of the sigmoid colon without diverticulitis.   LOCATION:  [facility]   Dictated by (CST): Jewel Barrios MD on 4/12/2025 at 11:41 PM     Finalized by (CST): Jewel Barrios MD on 4/12/2025 at 11:45 PM           External chart review showed review of Care Everywhere in [facility] system shows patient was admitted for and transfused back in December of this last year    History obtained by an independent source included from patient    Discussion of management with patient, hospitalist    Social determinants of health that affect care include not applicable      Medications Provided: Blood products    Course of Events during Emergency Room Visit include 64-year-old male presents emergency room for low hemoglobin level recheck the lab to show accuracy however given patient's paleness and history suspect it is accurate.  If accurate will transfuse.  Given patient will probably need 3 to 4 units of blood to get over 8 he will need admission for transfusion.  Patient will get a CT scan here.  His guaiac was trace positive on exam.  Patient  discussed with hospitalist.    I had [id number] call from radiology informing me the patient has an acute appendicitis.  We were not expecting that on findings.  Patient denies any pain he denies any fever or vomiting.  He has had some back pain.  I will speak with general surgery of plan for admission will speak with hospitalist    Patient's appendix is visualized on CT it appears to be retropointing.  This would account for the only pain the patient is experience which is mild back pain.  Patient discussed with general surgery they will see him in the morning.  I will start on IV antibiotics.    Patient discussed with hospitalist plan for admission.  Patient's vital signs are stable I do not think he requires ICU at this time.  He is completely asymptomatic regarding the appendicitis other than some mild back pain.  Patient is attributing that to the bed in the ER..  They want the patient to have GYN consult will page them.    Patient discussed with GI.  No further orders at this time  Disposition:    Admission  I have discussed with the patient the results of test, differential diagnosis, and treatment plan. They expressed clear understanding of these instructions and agrees to the plan provided.       Admission disposition: 4/13/2025 12:24 AM           Medical Decision Making      Disposition and Plan     Clinical Impression:  1. Symptomatic anemia    2. Microcytic anemia    3. Guaiac positive stools    4. Acute appendicitis, unspecified acute appendicitis type         Disposition:  Admit  4/13/2025 12:24 am    Follow-up:  No follow-up provider specified.        Medications Prescribed:  Current Discharge Medication List          Supplementary Documentation:         Hospital Problems       Present on Admission  Date Reviewed: 4/12/2025          ICD-10-CM Noted POA    * (Principal) Symptomatic anemia D64.9 4/13/2025 Unknown

## 2025-04-13 NOTE — CONSULTS
Gastroenterology Initial Consultation  I have personally seen and examined the patient.    Patient Name: Moises Olsen  Referring physician: Dr. Morrissey  Reason for consultation: Anemia  CC: Anemia  HPI: This is a 65 yo male with HTN, dyslipidemia, DM and who also has a history of prior duodenal ulcer disease.  He has been seen by us over the past 4 months related to presentations for epigastric abdominal pain and melena with the finding of recurrent duodenal ulcers.  He was subsequently found to have H.pylori, and recently completed bismuth-based quadruple therapy in February.  The patient was seen in follow-up and had been noted to continue to have a mild down-trending hgb.  He presented yesterday with complaints of progressive weakness, dyspnea on exertion over the past 2 weeks.  He reports no complaints of nausea/vomiting or abdominal pain.  He has had no melena or gross hematochezia.  In the ER, he was noted to have a hgb of 4.6 with subsequent iron studies revealing iron deficiency.  He was to have an outpatient video capsule endoscopy, but insurance did not cover it.    PMHx: Past Medical History[1]  PSHx: Past Surgical History[2]  Medications: Current Medications[3]  Allergies: Allergies[4]  SocHx:  No history of smoking;  The patient drinks alcohol on social occasions; The patient has no history of IV drug use or other illicit substances.  FamHx: The patient has no family history of colon cancer or other gastrointestinal malignancies;  No family history of ulcer disease, or inflammatory bowel disease  ROS:  In addition to the pertinent positives described above: As above    PE: /64 (BP Location: Right arm)   Pulse 71   Temp 98.1 °F (36.7 °C) (Oral)   Resp 23   Ht 5' 7\" (1.702 m)   Wt 233 lb 0.4 oz (105.7 kg)   SpO2 100%   BMI 36.50 kg/m²   Gen: AAO x 3, able to speak in complete sentences  HENT: NCAT, EOMI, PERRL, oropharynx is clear with moist mucosal membranes  Eyes: Sclerae are anicteric  Neck:   Supple without nuchal rigidity  Abdomen: Soft, non-tender, non-distended with the presence of bowel sounds; No hepatosplenomegaly; no rebound or guarding; No ascites is clinically apparent; no tympany to percussion  Ext: No peripheral edema or cyanosis  Skin: Warm and dry  Psychiatric: Appropriate mood and congruent affect without obvious depression or anxiety    Labs:   Lab Results   Component Value Date    WBC 5.0 04/13/2025    HGB 5.4 04/13/2025    HCT 20.2 04/13/2025    .0 04/13/2025    CREATSERUM 1.18 04/12/2025    BUN 17 04/12/2025     04/12/2025    K 3.8 04/12/2025     04/12/2025    CO2 26.0 04/12/2025     04/12/2025    CA 9.5 04/12/2025    ALB 4.6 04/12/2025    ALKPHO 58 04/12/2025    BILT 0.5 04/12/2025    AST 19 04/12/2025    ALT 7 04/12/2025    PTT 34.5 04/12/2025    INR 1.30 04/12/2025    PTP 16.4 04/12/2025     Recent Labs   Lab 04/12/25  1210 04/12/25  2204   GLU 85 105*   BUN 17 17   CREATSERUM 0.98 1.18   CA 9.8 9.5    143   K 3.8 3.8    107   CO2 27.0 26.0     Recent Labs   Lab 04/13/25  0444   RBC 3.10*   HGB 5.4*   HCT 20.2*   MCV 65.2*   MCH 17.4*   MCHC 26.7*   RDW 25.9   NEPRELIM 3.09   WBC 5.0   .0       Recent Labs   Lab 04/12/25  1210 04/12/25  2204   ALT 8* 7*   AST 14 19     Collected 4/13/2025  6:14 AM       Status: Final result    0 Result Notes      Component  Ref Range & Units (hover) 4/13/25  6:14 AM   Ferritin 3 Low    Resulting [facility] (Critical access hospital)             Specimen Collected: 04/13/25  6:14 AM Last Resulted: 04/13/25  6:53 AM   Collected 4/13/2025  6:14 AM       Status: Final result    0 Result Notes      Component  Ref Range & Units (hover) 4/13/25  6:14 AM   Iron 10 Low    Transferrin 369 High    Total Iron Binding Capacity 455 High    % Saturation 2 Low    Resulting [facility] (Critical access hospital)             Specimen Collected: 04/13/25  6:14 AM Last Resulted: 04/13/25  6:53 AM       Narrative   PROCEDURE:  CT ABDOMEN+PELVIS (ALL W+WO)  (CPT=74178)     COMPARISON:  None.     INDICATIONS:  was called to come get blood transfusion; hgb 4.6 history of rectal bleeing     TECHNIQUE:  Noncontrast scanning through the abdomen and pelvis was performed from the dome of the diaphragm to the pubic symphysis followed by IV contrast-enhanced multislice CT scanning through the abdomen and pelvis using nonionic contrast.  Post  contrast coronal MPR imaging was performed.  Dose reduction techniques were used. Dose information is transmitted to the ACR (American College of Radiology) NRDR (National Radiology Data Registry) which includes the Dose Index Registry.     PATIENT STATED HISTORY:(As transcribed by Technologist)  Was called to come get blood transfusion; hgb 4.6 history of rectal bleeing      CONTRAST USED:  100cc of Isovue 370     FINDINGS:    LIVER:  No enlargement, atrophy, abnormal density, or significant focal lesion.    BILIARY:  No visible dilatation or calcification.    PANCREAS:  No lesion, fluid collection, ductal dilatation, or atrophy.    SPLEEN:  No enlargement or focal lesion.    KIDNEYS:  No mass, obstruction, or calcification.  There are small cysts measuring under 1 cm bilaterally.  ADRENALS:  No mass or enlargement.    AORTA/VASCULAR:  No aneurysm or dissection.    RETROPERITONEUM:  No mass or adenopathy.  There are small lymph nodes within the retroperitoneum which are likely reactive.  BOWEL/MESENTERY:  The appendix is enlarged with Trena appendiceal inflammatory change and enhancement of the mucosa.  The appendix measures up to 11 mm in size consistent with acute appendicitis.  There is no abscess or free air.  There is diverticular  disease of the sigmoid colon without diverticulitis.  There is no abscess or free air.  The visualized small bowel is unremarkable.  ABDOMINAL WALL:  No mass or hernia.    URINARY BLADDER:  No visible focal wall thickening, lesion, or calculus.    PELVIC NODES:  No adenopathy.    PELVIC ORGANS:  No visible  mass.  Pelvic organs appropriate for patient age.    BONES:  No bony lesion or fracture.    LUNG BASES:  No visible pulmonary or pleural disease.    OTHER:  Negative.                     Impression   CONCLUSION:       1.  Findings consistent with acute appendicitis without abscess findings were discussed with Dr. Choi     2. Diverticulosis of the sigmoid colon without diverticulitis.        LOCATION:  [facility]        Dictated by (CST): Jewel Barrios MD on 4/12/2025 at 11:41 PM      Finalized by (CST): Jewel Barrios MD on 4/12/2025 at 11:45 PM           Case Time: Procedures: Surgeons:   1/22/2025 11:03 AM COLONOSCOPY with cold snare polypectomy    Geoffrey Milan MD               Signed         Colon operative report  Patient Name: Moises Olsen  Procedure: Colonoscopy with snare polypectomy  Date of procedure: 1/22/2025     Pre-operative Indication: Melena, acute blood loss anemia  Post-operative findings: Colon polyps, diverticulosis, hemorrhoids  Date of last colonoscopy: No prior examination  Attending: Geoffrey Milan M.D.  Consent: The risks, benefits, and alternatives were discussed with the patient / POA.  Risks included, but were not limited to, bleeding, perforation, medication effects, cardiac arrhythmias, missed polyps, and aspiration.  After all questions were answered to their satisfaction, a signed, informed, and witnessed consent was obtained.  Timeout:  Prior to initiation of sedation, a formal timeout was performed, confirming the patient's name, date of birth, allergies, correct procedure, and need for antibiotics.  The operating physician and sedating physician was also confirmed prior to initiation of sedation.      Sedation: Monitored Anesthesia Care  Monitoring: Pulsoximetry, pulse, respirations, and blood pressure were monitored throughout the entire procedure     Preparation Quality: Adequate                         [place] Prep Score:  Right: 3, Middle: 3, Left: 2                                 Total: 8  Procedure: After achieving adequate sedation, and placing the patient in the left lateral decubitus position, a digital rectal examination was performed.  The lubricated tip of the pediatric colonoscope was then introduced into the rectum and advanced to the terminal ileum.  The appendiceal orifice and ileocecal valve were clearly and distinctly visualized, thus verifying the cecum.  The terminal ileum was intubated and found to be normal to the extent examined.  The endoscope was then carefully withdrawn from the patient with careful visualization of the colonic mucosa revealing no additional pathologic findings.  Air was suctioned to the best of my ability, during withdrawal of the endoscope.  When the endoscope reached the rectum, it was placed in a retroflexed position, and the rectal bulb was thus visualized.  The endoscope was righted, and air was suctioned from the colon to the best of my ability, as it was during withdrawn from the colon.  The endoscope was then removed from the patient.  The patient tolerated the procedure without apparent procedural complications.  The patient left the procedure room in stable condition for recovery.  Findings:  There were grade II internal hemorrhoids.  There were no masses, fissures, fistulae, or external hemorrhoids.  The mucosa of the colon  was normal, from the rectum to the cecum.  There were no masses, ulcers, or erosions.  An occasional sigmoid diverticulum was appreciated.  In the mid-transverse colon, a 4 mm sessile polyp (Nice II) was resected by cold snare, using the standard hexagonal snare.  In the distal transverse colon, a 5 mm sessile polyp (Nice II) was resected by cold snare, using the standard hexagonal snare.  The appendiceal orifice and ileocecal valve were both clearly and distinctly visualized, thus verifying the cecum.  The terminal ileum was intubated and the terminal ileal mucosa was found to be normal to the extent examined.    Impression: Findings as above do not explain this patient's presentation.  Recommendations:   1) Follow-up pathology  2) Regular diet  3) Outpatient video capsule endoscopy  Repeat Colonoscopy Indicated: 7 years               Electronically signed by Geoffrey Milan MD at 1/22/2025 11:26 AM    Case Time: Procedures: Surgeons:   1/21/2025  4:45 PM ESOPHAGOGASTRODUODENOSCOPY (EGD)    Geoffrey Milan MD               Signed         EGD operative report  Patient Name: Moises Olsen  Procedure: Esophagogastroduodenoscopy   Date of procedure: 1/21/2025     Pre-operative Indication: Melena, acute blood loss anemia  Post-operative findings: Normal EGD  Attending: Geoffrey Milan M.D.  Consent:  The risks, benefits, and alternatives were discussed with the patient / POA.  Risks included, but were not limited to, bleeding, perforation, medication effects, cardiac arrhythmias, and aspiration.  After all questions were answered to their satisfaction, a signed, informed, and witnessed consent was obtained.  Timeout:  Prior to initiation of sedation, a formal timeout was performed, confirming the patient's name, date of birth, allergies, correct procedure, and need for antibiotics.  The operating physician and sedating physician was also confirmed prior to initiation of sedation.      Sedation: Monitored Anesthesia Care.  Monitoring:  Pulsoximetry, pulse, respirations, and blood pressure were monitored throughout the entire procedure  Procedure: After achieving adequate sedation and placing the patient in the left lateral decubitus position, the lubricated upper endoscope was introduced into the mouth and advanced to the descending duodenum.  The endoscope was then withdrawn into the gastric antrum and placed in a retroflexed position.  The endoscope was then righted, and air was suctioned from the stomach.  The endoscope was then withdrawn from the patient, with careful visual inspection of the mucosa revealing no  additional pathologic findings.  The patient tolerated the procedure without apparent procedural complications.  The patient left the procedure room in stable condition for recovery.  Findings: Esophagus: The mucosa was normal, without masses, polyps, ulcers, erosions, diverticula, or varices.  The squamocolumnar junction / esophagogastric junction / diaphragmatic impression were appreciated at 40 cm from the incisors.                     Stomach:  The gastric body, antrum, fundus, cardia, and angularis were normal, without masses, polyps, ulcers, erosions, diverticula, or varices.                   Duodenum: The duodenal bulb, post-bulbar duodenum, and descending duodenum were normal, without masses, polyps, ulcers, erosions, diverticula, or varices.    Impression: Findings as above do not explain this patient's anemia or melena.  Recommendations: Pursue colonoscopy tomorrow               Electronically signed by Geoffrey Milan MD at 1/21/2025  4:51 PM    Impression: This is a 65 yo male who is presenting with recurrent anemia and profound iron deficiency.  He has had a negative egd and colonoscopy in January and was unable to have the outpatient capsule endoscopy due to insurance issues.  He warrants inpatient video capsule study.  However, his CT also revealed acute appendicitis for which he is expected to have surgery this morning.      Recommendations:   1) PRBC's to achieve hgb > 7  2) No indication for PPI, given recent negative EGD and having completed H.pylori therapy.  3) Stool antigen for H.pylori  4) Hematology service consulted for management of iron deficiency  5) Will plan inpatient capsule study if ok with General Surgery            [1]   Past Medical History:   Anemia    Black stools    Blood in the stool    Diabetes (HCC)    Dizziness    Essential hypertension    Food intolerance    GI bleed    History of blood transfusion    Hyperlipidemia    Wears glasses   [2]   Past Surgical  History:  Procedure Laterality Date    Colonoscopy N/A 2025    Procedure: COLONOSCOPY with cold snare polypectomy;  Surgeon: Geoffrey Milan MD;  Location:  ENDOSCOPY   [3]    [] sodium chloride 0.9% infusion   Intravenous Continuous    acetaminophen (Tylenol Extra Strength) tab 500 mg  500 mg Oral Q4H PRN    ondansetron (Zofran) 4 MG/2ML injection 4 mg  4 mg Intravenous Q6H PRN    metoprolol succinate ER (Toprol XL) 24 hr tab 25 mg  25 mg Oral Daily Beta Blocker    pantoprazole (Protonix) DR tab 40 mg  40 mg Oral QAM AC    rosuvastatin (Crestor) tab 40 mg  40 mg Oral Daily    [COMPLETED] iopamidol 76% (ISOVUE-370) injection for power injector  100 mL Intravenous ONCE PRN    [COMPLETED] piperacillin-tazobactam (Zosyn) 4.5 g in dextrose 5% 100 mL IVPB-ADDV  4.5 g Intravenous Once   [4] No Known Allergies

## 2025-04-13 NOTE — ED INITIAL ASSESSMENT (HPI)
Pt states he saw PCP today for c/o dizziness x 2 weeks and slight TARA. States he got called by MD to come to ED for low Hgb- 4.6. Pt denies rectal bleed or bleeding elsewhere. He denies pain

## 2025-04-13 NOTE — CONSULTS
[facility] Consultation Note      Patient Name: Moises Olsen   YOB: 1960  Medical Record Number: [id number]  Consulting Physician: Lj Hester M.D.   Attending Physician: Lili Morrissey MD     The 21st Century Cures Act makes medical notes like these available to patients in the interest of transparency. Please be advised this is a medical document. Medical documents are intended to carry relevant information, facts as evident, and the clinical opinion of the practitioner. The medical note is intended as peer to peer communication and may appear blunt or direct. It is written in medical language and may contain abbreviations or verbiage that are unfamiliar.      Date of Consultation: 4/12/2025      Reason for Consultation (Chief Complaint)  Iron deficiency anemia.     History of Present Illness  Moises Olsen is a 64 year old male who on on 12/15/2024 presented to the ED with with epigastric pain and dark stools. He reported that in 02/2024, he was diagnosed with ulcer disease at [facility]. He also has a previous history of H. pylori positive gastric ulcer in 2017.    At that time, laboratory studies show a microcytic hypochromic anemia and iron deficiency. He underwent EGD on 12/16/2024 which showed two ulcers in the duodenal bulb. He was discharged on pantoprazole and sucralfate. Pathology from biopsies of the stomach subsequently showed H. pylori related gastritis. Patient failed to follow up with gastroenterology due to insurance issues.     He then presented again on 01/20/2025 to the ED with black colored stools. On 01/21/2025 he underwent EGD which showed no erosions or ulcerations. On 01/22/2025 he underwent colonoscopy which showed occasional sigmoid diverticulosis, a mid transverse colon sessile polyp, and a distal transverse colon sessile polyp. Pathology showed a tubular adenoma and sessile serrated polyp. He was discharged on PPI and oral iron  and was subsequently prescribed triple therapy for H. pylori.    He was seen by gastroenterology for follow up as on outpatient on 02/10/2025. He was advised to continue oral iron supplementation and to undergo video capsule study when approved by insurance. Hemoglobin on that day was 7.2 g/dl. Vitamin B12 and folic acid levels were normal.     Patient next presented to ED on 2025 after outpatient laboratory studies showed a hemoglobin of 4.6 g/dl. He denied any black stools but stool for occult blood was positive. He report shortness of breath with exertion. CT abdomen/pelvis w contrast showed findings concerning for acute appendicitis. Ferritin was 2 ng/ml.     He has received 2 units of PRBC.    Patient states that he discontinued oral iron several weeks ago thinking he no longer needed the medication.     Past Medical History   Past Medical History:    Anemia    Black stools    Blood in the stool    Diabetes (HCC)    Dizziness    Essential hypertension    Food intolerance    GI bleed    History of blood transfusion    Hyperlipidemia    Wears glasses     Past Surgical History   Past Surgical History:   Procedure Laterality Date    Colonoscopy N/A 2025    Procedure: COLONOSCOPY with cold snare polypectomy;  Surgeon: Geoffrey Milan MD;  Location:  ENDOSCOPY     Family History   No known family history of malignancy.     Social History   Social History     Socioeconomic History    Marital status: Single   Tobacco Use    Smoking status: Former     Current packs/day: 0.50     Average packs/day: 0.5 packs/day for 30.0 years (15.0 ttl pk-yrs)     Types: Cigarettes    Smokeless tobacco: Current    Tobacco comments:     Nicotine disposable vape use    Vaping Use    Vaping status: Every Day    Substances: Nicotine    Devices: Disposable, Pt states he is trying to quit   Substance and Sexual Activity    Alcohol use: Not Currently    Drug use: Never     Current Medications   [] sodium chloride 0.9%  infusion   Intravenous Continuous    acetaminophen (Tylenol Extra Strength) tab 500 mg  500 mg Oral Q4H PRN    ondansetron (Zofran) 4 MG/2ML injection 4 mg  4 mg Intravenous Q6H PRN    metoprolol succinate ER (Toprol XL) 24 hr tab 25 mg  25 mg Oral Daily Beta Blocker    pantoprazole (Protonix) DR tab 40 mg  40 mg Oral QAM AC    rosuvastatin (Crestor) tab 40 mg  40 mg Oral Daily    [COMPLETED] iopamidol 76% (ISOVUE-370) injection for power injector  100 mL Intravenous ONCE PRN    [COMPLETED] piperacillin-tazobactam (Zosyn) 4.5 g in dextrose 5% 100 mL IVPB-ADDV  4.5 g Intravenous Once      Allergies   Mr. Olsen has no known allergies.       Review of Systems       Vital Signs   /59 (BP Location: Right arm)   Pulse 64   Temp 98.1 °F (36.7 °C) (Oral)   Resp 14   Ht 1.702 m (5' 7\")   Wt 105.7 kg (233 lb 0.4 oz)   SpO2 99%   BMI 36.50 kg/m²     Physical Examination       Laboratory   Recent Results (from the past 48 hours)   BNP (BRAIN NATRIURETIC PEPTIDE) [06517] [Q]    Collection Time: 04/12/25 12:10 PM   Result Value Ref Range    Beta Natriuretic Peptide 489 (H) <100 pg/mL   CBC W Differential W Platelet [E]    Collection Time: 04/12/25 12:10 PM   Result Value Ref Range    WBC 5.9 4.0 - 11.0 x10(3) uL    RBC 2.92 (L) 4.30 - 5.70 x10(6)uL    HGB 4.6 (LL) 13.0 - 17.5 g/dL    HCT 18.6 (L) 39.0 - 53.0 %    .0 150.0 - 450.0 10(3)uL    Immature Platelet Fraction 5.4 0.0 - 7.0 %    MCV 63.7 (L) 80.0 - 100.0 fL    MCH 15.8 (L) 26.0 - 34.0 pg    MCHC 24.7 (L) 31.0 - 37.0 g/dL    RDW 23.6 %    Neutrophil Absolute Prelim 3.19 1.50 - 7.70 x10 (3) uL    Neutrophil Absolute 3.19 1.50 - 7.70 x10(3) uL    Lymphocyte Absolute 1.50 1.00 - 4.00 x10(3) uL    Monocyte Absolute 0.83 0.10 - 1.00 x10(3) uL    Eosinophil Absolute 0.27 0.00 - 0.70 x10(3) uL    Basophil Absolute 0.05 0.00 - 0.20 x10(3) uL    Immature Granulocyte Absolute 0.03 0.00 - 1.00 x10(3) uL    Neutrophil % 54.3 %    Lymphocyte % 25.6 %    Monocyte %  14.1 %    Eosinophil % 4.6 %    Basophil % 0.9 %    Immature Granulocyte % 0.5 %   Ferritin [E]    Collection Time: 04/12/25 12:10 PM   Result Value Ref Range    Ferritin 2 (L) 50 - 336 ng/mL   Basic Metabolic Panel (8) [E]    Collection Time: 04/12/25 12:10 PM   Result Value Ref Range    Glucose 85 70 - 99 mg/dL    Sodium 144 136 - 145 mmol/L    Potassium 3.8 3.5 - 5.1 mmol/L    Chloride 107 98 - 112 mmol/L    CO2 27.0 21.0 - 32.0 mmol/L    Anion Gap 10 0 - 18 mmol/L    BUN 17 9 - 23 mg/dL    Creatinine 0.98 0.70 - 1.30 mg/dL    Calcium, Total 9.8 8.7 - 10.6 mg/dL    Calculated Osmolality 299 (H) 275 - 295 mOsm/kg    eGFR-Cr 86 >=60 mL/min/1.73m2    Patient Fasting for BMP? No    Hemoglobin A1C [E]    Collection Time: 04/12/25 12:10 PM   Result Value Ref Range    HgbA1C      Estimated Average Glucose     Hepatic Function Panel (7)    Collection Time: 04/12/25 12:10 PM   Result Value Ref Range    AST 14 <34 U/L    ALT 8 (L) 10 - 49 U/L    Alkaline Phosphatase 57 45 - 117 U/L    Bilirubin, Total 0.5 0.2 - 1.1 mg/dL    Bilirubin, Direct 0.2 <=0.3 mg/dL    Total Protein 7.1 5.7 - 8.2 g/dL    Albumin 4.6 3.2 - 4.8 g/dL   RBC Morphology Scan    Collection Time: 04/12/25 12:10 PM   Result Value Ref Range    RBC Morphology See morphology below (A) Normal, Slide reviewed, see previous RBC morphology.    Platelet Morphology Normal Normal    Hypochromia 3+ (A)      Microcytosis 2+ (A)      Ovalocytes 1+     Comp Metabolic Panel (14)    Collection Time: 04/12/25 10:04 PM   Result Value Ref Range    Glucose 105 (H) 70 - 99 mg/dL    Sodium 143 136 - 145 mmol/L    Potassium 3.8 3.5 - 5.1 mmol/L    Chloride 107 98 - 112 mmol/L    CO2 26.0 21.0 - 32.0 mmol/L    Anion Gap 10 0 - 18 mmol/L    BUN 17 9 - 23 mg/dL    Creatinine 1.18 0.70 - 1.30 mg/dL    Calcium, Total 9.5 8.7 - 10.6 mg/dL    Calculated Osmolality 298 (H) 275 - 295 mOsm/kg    eGFR-Cr 69 >=60 mL/min/1.73m2    AST 19 <34 U/L    ALT 7 (L) 10 - 49 U/L    Alkaline  Phosphatase 58 45 - 117 U/L    Bilirubin, Total 0.5 0.2 - 1.1 mg/dL    Total Protein 7.0 5.7 - 8.2 g/dL    Albumin 4.6 3.2 - 4.8 g/dL    Globulin  2.4 2.0 - 3.5 g/dL    A/G Ratio 1.9 1.0 - 2.0   CBC With Differential With Platelet    Collection Time: 04/12/25 10:04 PM   Result Value Ref Range    WBC 5.0 4.0 - 11.0 x10(3) uL    RBC 2.96 (L) 4.30 - 5.70 x10(6)uL    HGB 4.7 (LL) 13.0 - 17.5 g/dL    HCT 18.5 (L) 39.0 - 53.0 %    .0 150.0 - 450.0 10(3)uL    Immature Platelet Fraction 5.6 0.0 - 7.0 %    MCV 62.5 (L) 80.0 - 100.0 fL    MCH 15.9 (L) 26.0 - 34.0 pg    MCHC 25.4 (L) 31.0 - 37.0 g/dL    RDW 23.3 %    Neutrophil Absolute Prelim 2.99 1.50 - 7.70 x10 (3) uL    Neutrophil Absolute 2.99 1.50 - 7.70 x10(3) uL    Lymphocyte Absolute 1.17 1.00 - 4.00 x10(3) uL    Monocyte Absolute 0.63 0.10 - 1.00 x10(3) uL    Eosinophil Absolute 0.18 0.00 - 0.70 x10(3) uL    Basophil Absolute 0.05 0.00 - 0.20 x10(3) uL    Immature Granulocyte Absolute 0.02 0.00 - 1.00 x10(3) uL    Neutrophil % 59.3 %    Lymphocyte % 23.2 %    Monocyte % 12.5 %    Eosinophil % 3.6 %    Basophil % 1.0 %    Immature Granulocyte % 0.4 %   Prothrombin Time (PT)    Collection Time: 04/12/25 10:04 PM   Result Value Ref Range    PT 16.4 (H) 11.6 - 14.8 seconds    INR 1.30 (H) 0.80 - 1.20   PTT, Activated    Collection Time: 04/12/25 10:04 PM   Result Value Ref Range    PTT 34.5 23.0 - 36.0 seconds   ABORH (Blood Type)    Collection Time: 04/12/25 10:04 PM   Result Value Ref Range    ABO BLOOD TYPE O     RH BLOOD TYPE Positive    Antibody Screen    Collection Time: 04/12/25 10:04 PM   Result Value Ref Range    Antibody Screen Negative    CBC With Differential With Platelet    Collection Time: 04/13/25  4:44 AM   Result Value Ref Range    WBC 5.0 4.0 - 11.0 x10(3) uL    RBC 3.10 (L) 4.30 - 5.70 x10(6)uL    HGB 5.4 (LL) 13.0 - 17.5 g/dL    HCT 20.2 (L) 39.0 - 53.0 %    .0 150.0 - 450.0 10(3)uL    Immature Platelet Fraction 4.8 0.0 - 7.0 %    MCV  65.2 (L) 80.0 - 100.0 fL    MCH 17.4 (L) 26.0 - 34.0 pg    MCHC 26.7 (L) 31.0 - 37.0 g/dL    RDW 25.9 %    Neutrophil Absolute Prelim 3.09 1.50 - 7.70 x10 (3) uL    Neutrophil Absolute 3.09 1.50 - 7.70 x10(3) uL    Lymphocyte Absolute 1.13 1.00 - 4.00 x10(3) uL    Monocyte Absolute 0.52 0.10 - 1.00 x10(3) uL    Eosinophil Absolute 0.21 0.00 - 0.70 x10(3) uL    Basophil Absolute 0.06 0.00 - 0.20 x10(3) uL    Immature Granulocyte Absolute 0.03 0.00 - 1.00 x10(3) uL    Neutrophil % 61.3 %    Lymphocyte % 22.4 %    Monocyte % 10.3 %    Eosinophil % 4.2 %    Basophil % 1.2 %    Immature Granulocyte % 0.6 %   Prepare RBC Once    Collection Time: 25  5:33 AM   Result Value Ref Range    Blood Product [id number]     Unit Number [id number]     UNIT ABO O     UNIT RH POS     Product Status Issued     Expiration Date [id number]     Blood Type Barcode 5100     Unit Volume 350 ml     Radiology   [facility]  Department of Radiology  [address] [place] [address] [address] [place] [id number] [phone]      Name: Moises Olsen Dr: Tona Choi DO  : 7/15/1960    Age/Sex: 64 year old Male  Pt. Phone: [phone]  Exam Date: 2025  Procedure: CT ABDOMEN+PELVIS(ALL W+WO)(CPT=74178)   -----------------------------------------------------------------------------------------------------------------------------------------------                  Tona Choi DO  [address] [facility] [id number]      Interpretation   PROCEDURE:  CT ABDOMEN+PELVIS (ALL W+WO) (CPT=74178)     COMPARISON:  None.     INDICATIONS:  was called to come get blood transfusion; hgb 4.6 history of rectal bleeing     TECHNIQUE:  Noncontrast scanning through the abdomen and pelvis was performed from the dome of the diaphragm to the pubic symphysis followed by IV contrast-enhanced multislice CT scanning through the abdomen and pelvis using nonionic contrast.  Post   contrast coronal MPR imaging was performed.   Dose reduction techniques were used. Dose information is transmitted to the ACR (American College of Radiology) NRDR (National Radiology Data Registry) which includes the Dose Index Registry.     PATIENT STATED HISTORY:(As transcribed by Technologist)  Was called to come get blood transfusion; hgb 4.6 history of rectal bleeing       CONTRAST USED:  100cc of Isovue 370     FINDINGS:    LIVER:  No enlargement, atrophy, abnormal density, or significant focal lesion.    BILIARY:  No visible dilatation or calcification.    PANCREAS:  No lesion, fluid collection, ductal dilatation, or atrophy.    SPLEEN:  No enlargement or focal lesion.    KIDNEYS:  No mass, obstruction, or calcification.  There are small cysts measuring under 1 cm bilaterally.  ADRENALS:  No mass or enlargement.    AORTA/VASCULAR:  No aneurysm or dissection.    RETROPERITONEUM:  No mass or adenopathy.  There are small lymph nodes within the retroperitoneum which are likely reactive.  BOWEL/MESENTERY:  The appendix is enlarged with Trena appendiceal inflammatory change and enhancement of the mucosa.  The appendix measures up to 11 mm in size consistent with acute appendicitis.  There is no abscess or free air.  There is diverticular   disease of the sigmoid colon without diverticulitis.  There is no abscess or free air.  The visualized small bowel is unremarkable.  ABDOMINAL WALL:  No mass or hernia.    URINARY BLADDER:  No visible focal wall thickening, lesion, or calculus.    PELVIC NODES:  No adenopathy.    PELVIC ORGANS:  No visible mass.  Pelvic organs appropriate for patient age.    BONES:  No bony lesion or fracture.    LUNG BASES:  No visible pulmonary or pleural disease.    OTHER:  Negative.                     =====  CONCLUSION:       1.  Findings consistent with acute appendicitis without abscess findings were discussed with Dr. Choi     2. Diverticulosis of the sigmoid colon without diverticulitis.        LOCATION:  [facility]        Dictated by  (CST): Jewel Barrios MD on 4/12/2025 at 11:41 PM       Finalized by (CST): Jewel Barrios MD on 4/12/2025 at 11:45 PM        Impression and Plan   Iron deficiency anemia: Due to GI blood losses. GI to evaluate patient to determine if any acute workup is required.     Patient receiving 2 units of PRBC. Hospitalist service can determine if further transfusion needed based upon repeat Hb after transfusion.     From hematology point of view, patient can be discharged if no procedures recommended by GI or surgery and Hb is adequate from point of view of hospitalist service.     I will arrange for IV iron therapy as an outpatient. If patient remains in house, I will give IV iron tomorrow but he does not need to remain in house for such therapy.     Start folic acid 1 mg daily.     Electronically Signed by:     Lj Hester M.D.  System Medical Director, Oncology Services  [facility]

## 2025-04-14 ENCOUNTER — ANESTHESIA (OUTPATIENT)
Dept: SURGERY | Facility: HOSPITAL | Age: 65
End: 2025-04-14
Payer: COMMERCIAL

## 2025-04-14 ENCOUNTER — ANESTHESIA EVENT (OUTPATIENT)
Dept: SURGERY | Facility: HOSPITAL | Age: 65
End: 2025-04-14
Payer: COMMERCIAL

## 2025-04-14 ENCOUNTER — ANESTHESIA EVENT (OUTPATIENT)
Dept: ENDOSCOPY | Facility: HOSPITAL | Age: 65
End: 2025-04-14
Payer: COMMERCIAL

## 2025-04-14 LAB
ANION GAP SERPL CALC-SCNC: 9 MMOL/L (ref 0–18)
BASOPHILS # BLD AUTO: 0.04 X10(3) UL (ref 0–0.2)
BASOPHILS # BLD AUTO: 0.1 X10(3) UL (ref 0–0.2)
BASOPHILS NFR BLD AUTO: 0.4 %
BASOPHILS NFR BLD AUTO: 1.5 %
BUN BLD-MCNC: 12 MG/DL (ref 9–23)
CALCIUM BLD-MCNC: 9.7 MG/DL (ref 8.7–10.6)
CHLORIDE SERPL-SCNC: 103 MMOL/L (ref 98–112)
CO2 SERPL-SCNC: 27 MMOL/L (ref 21–32)
CREAT BLD-MCNC: 0.89 MG/DL (ref 0.7–1.3)
EGFRCR SERPLBLD CKD-EPI 2021: 96 ML/MIN/1.73M2 (ref 60–?)
EOSINOPHIL # BLD AUTO: 0.01 X10(3) UL (ref 0–0.7)
EOSINOPHIL # BLD AUTO: 0.35 X10(3) UL (ref 0–0.7)
EOSINOPHIL NFR BLD AUTO: 0.1 %
EOSINOPHIL NFR BLD AUTO: 5.4 %
ERYTHROCYTE [DISTWIDTH] IN BLOOD BY AUTOMATED COUNT: 27.9 %
ERYTHROCYTE [DISTWIDTH] IN BLOOD BY AUTOMATED COUNT: 28.5 %
GLUCOSE BLD-MCNC: 130 MG/DL (ref 70–99)
GLUCOSE BLD-MCNC: 89 MG/DL (ref 70–99)
HCT VFR BLD AUTO: 27.7 % (ref 39–53)
HCT VFR BLD AUTO: 28.7 % (ref 39–53)
HGB BLD-MCNC: 7.8 G/DL (ref 13–17.5)
HGB BLD-MCNC: 7.9 G/DL (ref 13–17.5)
IMM GRANULOCYTES # BLD AUTO: 0.04 X10(3) UL (ref 0–1)
IMM GRANULOCYTES # BLD AUTO: 0.08 X10(3) UL (ref 0–1)
IMM GRANULOCYTES NFR BLD: 0.6 %
IMM GRANULOCYTES NFR BLD: 0.8 %
LYMPHOCYTES # BLD AUTO: 0.35 X10(3) UL (ref 1–4)
LYMPHOCYTES # BLD AUTO: 1.1 X10(3) UL (ref 1–4)
LYMPHOCYTES NFR BLD AUTO: 16.9 %
LYMPHOCYTES NFR BLD AUTO: 3.5 %
MCH RBC QN AUTO: 19.4 PG (ref 26–34)
MCH RBC QN AUTO: 19.8 PG (ref 26–34)
MCHC RBC AUTO-ENTMCNC: 27.2 G/DL (ref 31–37)
MCHC RBC AUTO-ENTMCNC: 28.5 G/DL (ref 31–37)
MCV RBC AUTO: 68.1 FL (ref 80–100)
MCV RBC AUTO: 72.8 FL (ref 80–100)
MONOCYTES # BLD AUTO: 0.38 X10(3) UL (ref 0.1–1)
MONOCYTES # BLD AUTO: 0.64 X10(3) UL (ref 0.1–1)
MONOCYTES NFR BLD AUTO: 3.8 %
MONOCYTES NFR BLD AUTO: 9.9 %
NEUTROPHILS # BLD AUTO: 4.26 X10 (3) UL (ref 1.5–7.7)
NEUTROPHILS # BLD AUTO: 4.26 X10(3) UL (ref 1.5–7.7)
NEUTROPHILS # BLD AUTO: 9.17 X10 (3) UL (ref 1.5–7.7)
NEUTROPHILS # BLD AUTO: 9.17 X10(3) UL (ref 1.5–7.7)
NEUTROPHILS NFR BLD AUTO: 65.7 %
NEUTROPHILS NFR BLD AUTO: 91.4 %
OSMOLALITY SERPL CALC.SUM OF ELEC: 287 MOSM/KG (ref 275–295)
PLATELET # BLD AUTO: 234 10(3)UL (ref 150–450)
PLATELET # BLD AUTO: 235 10(3)UL (ref 150–450)
PLATELETS.RETICULATED NFR BLD AUTO: 5.8 % (ref 0–7)
PLATELETS.RETICULATED NFR BLD AUTO: 6.4 % (ref 0–7)
POTASSIUM SERPL-SCNC: 4 MMOL/L (ref 3.5–5.1)
RBC # BLD AUTO: 3.94 X10(6)UL (ref 4.3–5.7)
RBC # BLD AUTO: 4.07 X10(6)UL (ref 4.3–5.7)
SODIUM SERPL-SCNC: 139 MMOL/L (ref 136–145)
WBC # BLD AUTO: 10 X10(3) UL (ref 4–11)
WBC # BLD AUTO: 6.5 X10(3) UL (ref 4–11)

## 2025-04-14 PROCEDURE — 0DTJ4ZZ RESECTION OF APPENDIX, PERCUTANEOUS ENDOSCOPIC APPROACH: ICD-10-PCS | Performed by: SURGERY

## 2025-04-14 PROCEDURE — 44970 LAPAROSCOPY APPENDECTOMY: CPT | Performed by: SURGERY

## 2025-04-14 RX ORDER — HYDROCODONE BITARTRATE AND ACETAMINOPHEN 5; 325 MG/1; MG/1
1 TABLET ORAL ONCE AS NEEDED
Status: DISCONTINUED | OUTPATIENT
Start: 2025-04-14 | End: 2025-04-14 | Stop reason: HOSPADM

## 2025-04-14 RX ORDER — ONDANSETRON 2 MG/ML
4 INJECTION INTRAMUSCULAR; INTRAVENOUS EVERY 6 HOURS PRN
Status: DISCONTINUED | OUTPATIENT
Start: 2025-04-14 | End: 2025-04-19

## 2025-04-14 RX ORDER — HEPARIN SODIUM 5000 [USP'U]/ML
5000 INJECTION, SOLUTION INTRAVENOUS; SUBCUTANEOUS EVERY 8 HOURS SCHEDULED
Status: DISCONTINUED | OUTPATIENT
Start: 2025-04-14 | End: 2025-04-19

## 2025-04-14 RX ORDER — LIDOCAINE HYDROCHLORIDE 10 MG/ML
INJECTION, SOLUTION EPIDURAL; INFILTRATION; INTRACAUDAL; PERINEURAL AS NEEDED
Status: DISCONTINUED | OUTPATIENT
Start: 2025-04-14 | End: 2025-04-14 | Stop reason: SURG

## 2025-04-14 RX ORDER — ACETAMINOPHEN 500 MG
1000 TABLET ORAL EVERY 8 HOURS SCHEDULED
Status: DISCONTINUED | OUTPATIENT
Start: 2025-04-14 | End: 2025-04-19

## 2025-04-14 RX ORDER — BUPIVACAINE HYDROCHLORIDE AND EPINEPHRINE 5; 5 MG/ML; UG/ML
INJECTION, SOLUTION EPIDURAL; INTRACAUDAL; PERINEURAL AS NEEDED
Status: DISCONTINUED | OUTPATIENT
Start: 2025-04-14 | End: 2025-04-14 | Stop reason: HOSPADM

## 2025-04-14 RX ORDER — DEXAMETHASONE SODIUM PHOSPHATE 4 MG/ML
VIAL (ML) INJECTION AS NEEDED
Status: DISCONTINUED | OUTPATIENT
Start: 2025-04-14 | End: 2025-04-14 | Stop reason: SURG

## 2025-04-14 RX ORDER — NALOXONE HYDROCHLORIDE 0.4 MG/ML
0.08 INJECTION, SOLUTION INTRAMUSCULAR; INTRAVENOUS; SUBCUTANEOUS AS NEEDED
Status: DISCONTINUED | OUTPATIENT
Start: 2025-04-14 | End: 2025-04-14 | Stop reason: HOSPADM

## 2025-04-14 RX ORDER — SODIUM CHLORIDE, SODIUM LACTATE, POTASSIUM CHLORIDE, CALCIUM CHLORIDE 600; 310; 30; 20 MG/100ML; MG/100ML; MG/100ML; MG/100ML
INJECTION, SOLUTION INTRAVENOUS CONTINUOUS
Status: DISCONTINUED | OUTPATIENT
Start: 2025-04-14 | End: 2025-04-14 | Stop reason: HOSPADM

## 2025-04-14 RX ORDER — NICOTINE POLACRILEX 4 MG
15 LOZENGE BUCCAL
Status: DISCONTINUED | OUTPATIENT
Start: 2025-04-14 | End: 2025-04-14 | Stop reason: HOSPADM

## 2025-04-14 RX ORDER — HYDROMORPHONE HYDROCHLORIDE 1 MG/ML
0.8 INJECTION, SOLUTION INTRAMUSCULAR; INTRAVENOUS; SUBCUTANEOUS EVERY 2 HOUR PRN
Status: DISCONTINUED | OUTPATIENT
Start: 2025-04-14 | End: 2025-04-19

## 2025-04-14 RX ORDER — NICOTINE POLACRILEX 4 MG
30 LOZENGE BUCCAL
Status: DISCONTINUED | OUTPATIENT
Start: 2025-04-14 | End: 2025-04-14 | Stop reason: HOSPADM

## 2025-04-14 RX ORDER — DEXTROSE MONOHYDRATE 25 G/50ML
50 INJECTION, SOLUTION INTRAVENOUS
Status: DISCONTINUED | OUTPATIENT
Start: 2025-04-14 | End: 2025-04-14 | Stop reason: HOSPADM

## 2025-04-14 RX ORDER — METRONIDAZOLE 500 MG/100ML
INJECTION, SOLUTION INTRAVENOUS AS NEEDED
Status: DISCONTINUED | OUTPATIENT
Start: 2025-04-14 | End: 2025-04-14 | Stop reason: SURG

## 2025-04-14 RX ORDER — HYDROMORPHONE HYDROCHLORIDE 1 MG/ML
0.4 INJECTION, SOLUTION INTRAMUSCULAR; INTRAVENOUS; SUBCUTANEOUS EVERY 5 MIN PRN
Status: DISCONTINUED | OUTPATIENT
Start: 2025-04-14 | End: 2025-04-14 | Stop reason: HOSPADM

## 2025-04-14 RX ORDER — ONDANSETRON 2 MG/ML
4 INJECTION INTRAMUSCULAR; INTRAVENOUS EVERY 6 HOURS PRN
Status: DISCONTINUED | OUTPATIENT
Start: 2025-04-14 | End: 2025-04-14 | Stop reason: HOSPADM

## 2025-04-14 RX ORDER — SODIUM CHLORIDE 9 MG/ML
INJECTION, SOLUTION INTRAVENOUS ONCE
Status: COMPLETED | OUTPATIENT
Start: 2025-04-14 | End: 2025-04-14

## 2025-04-14 RX ORDER — GLYCOPYRROLATE 0.2 MG/ML
INJECTION, SOLUTION INTRAMUSCULAR; INTRAVENOUS AS NEEDED
Status: DISCONTINUED | OUTPATIENT
Start: 2025-04-14 | End: 2025-04-14 | Stop reason: SURG

## 2025-04-14 RX ORDER — PROCHLORPERAZINE EDISYLATE 5 MG/ML
5 INJECTION INTRAMUSCULAR; INTRAVENOUS EVERY 8 HOURS PRN
Status: DISCONTINUED | OUTPATIENT
Start: 2025-04-14 | End: 2025-04-14 | Stop reason: HOSPADM

## 2025-04-14 RX ORDER — ONDANSETRON 2 MG/ML
INJECTION INTRAMUSCULAR; INTRAVENOUS AS NEEDED
Status: DISCONTINUED | OUTPATIENT
Start: 2025-04-14 | End: 2025-04-14 | Stop reason: SURG

## 2025-04-14 RX ORDER — HYDROMORPHONE HYDROCHLORIDE 1 MG/ML
0.4 INJECTION, SOLUTION INTRAMUSCULAR; INTRAVENOUS; SUBCUTANEOUS EVERY 2 HOUR PRN
Status: DISCONTINUED | OUTPATIENT
Start: 2025-04-14 | End: 2025-04-19

## 2025-04-14 RX ORDER — HYDROMORPHONE HYDROCHLORIDE 1 MG/ML
0.2 INJECTION, SOLUTION INTRAMUSCULAR; INTRAVENOUS; SUBCUTANEOUS EVERY 5 MIN PRN
Status: DISCONTINUED | OUTPATIENT
Start: 2025-04-14 | End: 2025-04-14 | Stop reason: HOSPADM

## 2025-04-14 RX ORDER — HYDROCODONE BITARTRATE AND ACETAMINOPHEN 5; 325 MG/1; MG/1
2 TABLET ORAL ONCE AS NEEDED
Status: DISCONTINUED | OUTPATIENT
Start: 2025-04-14 | End: 2025-04-14 | Stop reason: HOSPADM

## 2025-04-14 RX ORDER — NEOSTIGMINE METHYLSULFATE 1 MG/ML
INJECTION INTRAVENOUS AS NEEDED
Status: DISCONTINUED | OUTPATIENT
Start: 2025-04-14 | End: 2025-04-14 | Stop reason: SURG

## 2025-04-14 RX ORDER — ROCURONIUM BROMIDE 10 MG/ML
INJECTION, SOLUTION INTRAVENOUS AS NEEDED
Status: DISCONTINUED | OUTPATIENT
Start: 2025-04-14 | End: 2025-04-14 | Stop reason: SURG

## 2025-04-14 RX ORDER — ACETAMINOPHEN 500 MG
1000 TABLET ORAL ONCE AS NEEDED
Status: DISCONTINUED | OUTPATIENT
Start: 2025-04-14 | End: 2025-04-14 | Stop reason: HOSPADM

## 2025-04-14 RX ORDER — SODIUM CHLORIDE 9 MG/ML
INJECTION, SOLUTION INTRAVENOUS CONTINUOUS
Status: DISCONTINUED | OUTPATIENT
Start: 2025-04-14 | End: 2025-04-14 | Stop reason: HOSPADM

## 2025-04-14 RX ORDER — HYDROMORPHONE HYDROCHLORIDE 1 MG/ML
0.6 INJECTION, SOLUTION INTRAMUSCULAR; INTRAVENOUS; SUBCUTANEOUS EVERY 5 MIN PRN
Status: DISCONTINUED | OUTPATIENT
Start: 2025-04-14 | End: 2025-04-14 | Stop reason: HOSPADM

## 2025-04-14 RX ORDER — OXYCODONE HYDROCHLORIDE 5 MG/1
5 TABLET ORAL EVERY 4 HOURS PRN
Status: DISCONTINUED | OUTPATIENT
Start: 2025-04-14 | End: 2025-04-19

## 2025-04-14 RX ORDER — PROCHLORPERAZINE EDISYLATE 5 MG/ML
5 INJECTION INTRAMUSCULAR; INTRAVENOUS EVERY 8 HOURS PRN
Status: DISCONTINUED | OUTPATIENT
Start: 2025-04-14 | End: 2025-04-19

## 2025-04-14 RX ADMIN — DEXAMETHASONE SODIUM PHOSPHATE 4 MG: 4 MG/ML VIAL (ML) INJECTION at 11:43:00

## 2025-04-14 RX ADMIN — SODIUM CHLORIDE: 9 INJECTION, SOLUTION INTRAVENOUS at 12:59:00

## 2025-04-14 RX ADMIN — METRONIDAZOLE 500 MG: 500 INJECTION, SOLUTION INTRAVENOUS at 11:41:00

## 2025-04-14 RX ADMIN — ONDANSETRON 4 MG: 2 INJECTION INTRAMUSCULAR; INTRAVENOUS at 12:28:00

## 2025-04-14 RX ADMIN — LIDOCAINE HYDROCHLORIDE 100 MG: 10 INJECTION, SOLUTION EPIDURAL; INFILTRATION; INTRACAUDAL; PERINEURAL at 11:39:00

## 2025-04-14 RX ADMIN — NEOSTIGMINE METHYLSULFATE 4 MG: 1 INJECTION INTRAVENOUS at 12:28:00

## 2025-04-14 RX ADMIN — GLYCOPYRROLATE 0.8 MG: 0.2 INJECTION, SOLUTION INTRAMUSCULAR; INTRAVENOUS at 12:28:00

## 2025-04-14 RX ADMIN — ROCURONIUM BROMIDE 50 MG: 10 INJECTION, SOLUTION INTRAVENOUS at 11:43:00

## 2025-04-14 RX ADMIN — SODIUM CHLORIDE: 9 INJECTION, SOLUTION INTRAVENOUS at 11:34:00

## 2025-04-14 NOTE — ANESTHESIA POSTPROCEDURE EVALUATION
[facility]    Moises Olsen Patient Status:  Inpatient   Age/Gender 64 year old male MRN [id number]   Location [facility] POST ANESTHESIA CARE UNIT Attending Lili Morrissey MD   Hosp Day # 1 PCP Osiel Rader MD       Anesthesia Post-op Note    ACUTE LAPAROSCOPIC APPENDECTOMY WITH LOCALIZED PERITONITIS    Procedure Summary       Date: 04/14/25 Room / Location:  MAIN OR 06 /  MAIN OR    Anesthesia Start: 1134 Anesthesia Stop: 1259    Procedure: ACUTE LAPAROSCOPIC APPENDECTOMY WITH LOCALIZED PERITONITIS (Abdomen) Diagnosis:       Acute appendicitis      (Acute appendicitis [K35.80])    Surgeons: Carla Mcdonnell MD Anesthesiologist: Nicky Tracy MD    Anesthesia Type: general ASA Status: Not recorded            Anesthesia Type: general    Vitals Value Taken Time   /68 04/14/25 13:07   Temp 98 04/14/25 13:11   Pulse 64 04/14/25 13:11   Resp 19 04/14/25 13:11   SpO2 93% 04/14/25 13:11   Vitals shown include unfiled device data.        Patient Location: PACU    Anesthesia Type: general    Airway Patency: patent and extubated    Postop Pain Control: adequate    Mental Status: mildly sedated but able to meaningfully participate in the post-anesthesia evaluation    Nausea/Vomiting: none    Cardiopulmonary/Hydration status: stable euvolemic    Complications: no apparent anesthesia related complications    Postop vital signs: stable    Dental Exam: Unchanged from Preop    Patient to be discharged from PACU when criteria met.

## 2025-04-14 NOTE — PROGRESS NOTES
Back from surgery a/o x4, V/S taken and recorded, 3 lap sites   Dry and intact, Started on clear liquid diet and tolerated well.  Will be npo after midnight for Mri and Egd and video capsule  endoscopy tomorrow, Consent was signed..

## 2025-04-14 NOTE — ANESTHESIA PREPROCEDURE EVALUATION
PRE-OP EVALUATION    Patient Name: Moises Olsen    Admit Diagnosis: Microcytic anemia [D50.9]  Guaiac positive stools [R19.5]  Symptomatic anemia [D64.9]  Acute appendicitis, unspecified acute appendicitis type [K35.80]    Pre-op Diagnosis: Acute appendicitis [K35.80]    ACUTE LAPAROSCOPIC APPENDECTOMY WITH LOCALIZED PERITONITIS    Anesthesia Procedure: ACUTE LAPAROSCOPIC APPENDECTOMY WITH LOCALIZED PERITONITIS (Abdomen)    Surgeons and Role:     * Carla Mcdonnell MD - Primary    Pre-op vitals reviewed.  Temp: 98.2 °F (36.8 °C)  Pulse: 58  Resp: 17  BP: 132/70  SpO2: 96 %  Body mass index is 36.5 kg/m².    Current medications reviewed.  Hospital Medications:  Current Medications[1]    Outpatient Medications:   Prescriptions Prior to Admission[2]    Allergies: Patient has no known allergies.      Anesthesia Evaluation    Patient summary reviewed.    Anesthetic Complications  (-) history of anesthetic complications         GI/Hepatic/Renal  Comment: GIB / duodenal ulcers                               Cardiovascular                (+) obesity  (+) hypertension                                     Endo/Other      (+) diabetes         (+) anemia                   Pulmonary               (-) shortness of breath            Neuro/Psych                                      Past Surgical History[3]  Social Hx on file[4]  History   Drug Use Unknown     Available pre-op labs reviewed.  Lab Results   Component Value Date    WBC 6.5 04/14/2025    RBC 4.07 (L) 04/14/2025    HGB 7.9 (L) 04/14/2025    HCT 27.7 (L) 04/14/2025    MCV 68.1 (L) 04/14/2025    MCH 19.4 (L) 04/14/2025    MCHC 28.5 (L) 04/14/2025    RDW 27.9 04/14/2025    .0 04/14/2025     Lab Results   Component Value Date     04/14/2025    K 4.0 04/14/2025     04/14/2025    CO2 27.0 04/14/2025    BUN 12 04/14/2025    CREATSERUM 0.89 04/14/2025    GLU 89 04/14/2025    CA 9.7 04/14/2025     Lab Results   Component Value Date    INR 1.30 (H) 04/12/2025          Airway      Mallampati: II  Mouth opening: >3 FB  TM distance: > 6 cm  Neck ROM: full Cardiovascular    Cardiovascular exam normal.  Rhythm: regular  Rate: normal     Dental  Comment: Denies chipped or loose teeth           Pulmonary    Pulmonary exam normal.  Breath sounds clear to auscultation bilaterally.               Other findings              ASA: 3   Plan: general  NPO status verified and patient meets guidelines.    Post-procedure pain management plan discussed with surgeon and patient.    Comment: Risks include but limited to oral and dental injury, nausea/vomiting, anaphylaxis, prolonged ventilation and myocardial infarction. All questions were answered to the patient's satisfaction. Patient expressed understanding and wishes to proceed.   Plan/risks discussed with: patient                Present on Admission:   Hypertension   High blood cholesterol             [1]    [COMPLETED] sodium chloride 0.9% infusion   Intravenous Once    [Transfer Hold] sodium ferric gluconate (Ferrlecit) 125 mg in sodium chloride 0.9% 100mL IVPB premix  125 mg Intravenous Daily    [Transfer Hold] pantoprazole (Protonix) 40 mg in sodium chloride 0.9% PF 10 mL IV push  40 mg Intravenous Q24H    glucose (Dex4) 15 GM/59ML oral liquid 15 g  15 g Oral Q15 Min PRN    Or    glucose (Glutose) 40% oral gel 15 g  15 g Oral Q15 Min PRN    Or    glucose-vitamin C (Dex-4) chewable tab 4 tablet  4 tablet Oral Q15 Min PRN    Or    dextrose 50% injection 50 mL  50 mL Intravenous Q15 Min PRN    Or    glucose (Dex4) 15 GM/59ML oral liquid 30 g  30 g Oral Q15 Min PRN    Or    glucose (Glutose) 40% oral gel 30 g  30 g Oral Q15 Min PRN    Or    glucose-vitamin C (Dex-4) chewable tab 8 tablet  8 tablet Oral Q15 Min PRN    lactated ringers infusion   Intravenous Continuous    lactated ringers IV bolus 500 mL  500 mL Intravenous Once PRN    atropine 0.1 MG/ML injection 0.5 mg  0.5 mg Intravenous PRN    naloxone (Narcan) 0.4 MG/ML injection 0.08  mg  0.08 mg Intravenous PRN    fentaNYL (Sublimaze) 50 mcg/mL injection 25 mcg  25 mcg Intravenous Q5 Min PRN    Or    fentaNYL (Sublimaze) 50 mcg/mL injection 50 mcg  50 mcg Intravenous Q5 Min PRN    HYDROmorphone (Dilaudid) 1 MG/ML injection 0.2 mg  0.2 mg Intravenous Q5 Min PRN    Or    HYDROmorphone (Dilaudid) 1 MG/ML injection 0.4 mg  0.4 mg Intravenous Q5 Min PRN    Or    HYDROmorphone (Dilaudid) 1 MG/ML injection 0.6 mg  0.6 mg Intravenous Q5 Min PRN    acetaminophen (Tylenol Extra Strength) tab 1,000 mg  1,000 mg Oral Once PRN    Or    HYDROcodone-acetaminophen (Norco) 5-325 MG per tab 1 tablet  1 tablet Oral Once PRN    Or    HYDROcodone-acetaminophen (Norco) 5-325 MG per tab 2 tablet  2 tablet Oral Once PRN    ondansetron (Zofran) 4 MG/2ML injection 4 mg  4 mg Intravenous Q6H PRN    prochlorperazine (Compazine) 10 MG/2ML injection 5 mg  5 mg Intravenous Q8H PRN    sodium chloride 0.9% infusion   Intravenous Continuous    [] sodium chloride 0.9% infusion   Intravenous Continuous    [Transfer Hold] acetaminophen (Tylenol Extra Strength) tab 500 mg  500 mg Oral Q4H PRN    [Transfer Hold] ondansetron (Zofran) 4 MG/2ML injection 4 mg  4 mg Intravenous Q6H PRN    [Transfer Hold] metoprolol succinate ER (Toprol XL) 24 hr tab 25 mg  25 mg Oral Daily Beta Blocker    [Transfer Hold] rosuvastatin (Crestor) tab 40 mg  40 mg Oral Daily    [COMPLETED] sodium ferric gluconate (Ferrlecit) 125 mg in sodium chloride 0.9% 100mL IVPB premix  125 mg Intravenous Once    [Transfer Hold] folic acid (Folvite) tab 1 mg  1 mg Oral Daily    piperacillin-tazobactam (Zosyn) 3.375 g in dextrose 5% 100 mL IVPB-ADDV  3.375 g Intravenous Q8H    [COMPLETED] iopamidol 76% (ISOVUE-370) injection for power injector  100 mL Intravenous ONCE PRN    [COMPLETED] piperacillin-tazobactam (Zosyn) 4.5 g in dextrose 5% 100 mL IVPB-ADDV  4.5 g Intravenous Once   [2]   Medications Prior to Admission   Medication Sig Dispense Refill Last  Dose/Taking    rosuvastatin 40 MG Oral Tab Take 1 tablet (40 mg total) by mouth in the morning.   2025 Evening    pantoprazole 40 MG Oral Tab EC Take 1 tablet (40 mg total) by mouth every morning before breakfast.   2025 Morning    Losartan Potassium-HCTZ 100-25 MG Oral Tab Take 1 tablet by mouth daily. 90 tablet 1 2025 Morning    metoprolol succinate ER 25 MG Oral Tablet 24 Hr TAKE A HALF (1/2) TABLET BY MOUTH DAILY WITH A MEAL       losartan 50 MG Oral Tab 1 tablet (50 mg total).       Tadalafil 10 MG Oral Tab Take 1 tablet (10 mg total) by mouth daily as needed for Erectile Dysfunction. 20 tablet 0     Ferrous Sulfate 325 (65 Fe) MG Oral Tab Take 1 tablet (325 mg total) by mouth every other day. 60 tablet 0     [] Bismuth Subsalicylate 525 MG Oral Tab Take 1 tablet by mouth in the morning and 1 tablet before bedtime. Do all this for 10 days. 20 tablet 0     [] Tetracycline HCl 500 MG Oral Cap Take 1 capsule (500 mg total) by mouth 4 (four) times daily for 10 days. 40 capsule 0     [] metroNIDAZOLE 250 MG Oral Tab Take 1 tablet (250 mg total) by mouth 4 (four) times daily for 10 days. 40 tablet 0     atorvastatin 40 MG Oral Tab Take 1 tablet (40 mg total) by mouth nightly. 90 tablet 1    [3]   Past Surgical History:  Procedure Laterality Date    Colonoscopy N/A 2025    Procedure: COLONOSCOPY with cold snare polypectomy;  Surgeon: Geoffrey Milan MD;  Location:  ENDOSCOPY   [4]   Social History  Socioeconomic History    Marital status: Single   Tobacco Use    Smoking status: Former     Current packs/day: 0.50     Average packs/day: 0.5 packs/day for 30.0 years (15.0 ttl pk-yrs)     Types: Cigarettes    Smokeless tobacco: Current    Tobacco comments:     Nicotine disposable vape use    Vaping Use    Vaping status: Every Day    Substances: Nicotine    Devices: Disposable, Pt states he is trying to quit   Substance and Sexual Activity    Alcohol use: Not Currently    Drug  use: Never

## 2025-04-14 NOTE — PROGRESS NOTES
To surgery with prbc running, Tolerated the infusion well, For laparoscopic appendectomy,Consent was signed.

## 2025-04-14 NOTE — DISCHARGE INSTRUCTIONS
Follow up with hematologist Dr. Lj Hester on 4/30/25 at 1:05PM in [facility].         Home Care Instructions  Appendectomy  Dr. Carla Mcdonnell    MEDICATIONS  For post-operative pain control, the medications are usually oxycodone.  This is a narcotic and is best taken by starting with a half a tablet every four to six hours as needed.  If the patient does not feel they need the narcotics they shouldn’t take them.  If the pain is severe the patient may take a whole pill every six hours.  The patient can take tylenol 1g three times a day and ibuprofen 400-600mg in between up to 4 times a day as needed for pain as well.  The patient can also take miralax or colace as needed for constipation. Please ask your surgeon before resuming blood thinners such as aspirin, Plavix or Coumadin.  All other home medications may be resumed as scheduled. With appendicitis, antibiotics will also be prescribed.  With antibiotics, please watch for rash, facial swelling or severe diarrhea.     DIET  The patient may resume a general diet immediately.  This is not a good time to eat excessively.  The patient should eat in moderation and stick with foods the patient feels are easy to digest.  There should be no alcohol consumption in the immediate recover time period or within six hours of taking narcotics.    WOUND CARE  The top dressing may be removed the day after surgery.  This includes the gauze, tape and band-aids if they are present.  Do not remove the steri-strips or butterfly tapes that are white and adherent to the skin.  The steri-strips will eventually peel up at the ends and at this point they may be removed.  This is usually seven to ten days after surgery.  The patient may shower the day after surgery.  There is no need to cover the incisions and all top gauze type dressings should be removed prior to showering.  Soap can get on the wounds but do not scrub over the wounds.  No hair dye or chemicals of any kind  should get in the wounds.  Avoid tub baths for two weeks.  If visible sutures or staples are present they will be removed in the office by the surgeon or nurse.  Most wounds will be closed with dissolving suture underneath the skin.  These sutures will dissolve on their own.    ACTIVITY  Every day the patient should be up, showered and dressed.  Each day the patient should be up and around the house.  The patient should not lie in bed and should not stay in pajamas.  We count on the patient being up, coughing, walking and deep breathing to avoid pneumonia and blood clots in the legs.  Once a day the patient should get out of the house and go shopping, go to the mall, the [employer] store, the movies or a restaurant.  The patient may ride in a car but should not drive the car for at least one week.  Patients should be off narcotics for at least 8 hours prior to driving.  The average time off work is 10 to 14 days; most adults will be seeing the surgeon prior to returning to work.  Students will return to school within 1-5 days after discharge from the hospital but will be off gym, sports, and indoors for recess for one month.  Patients may go up and down stairs and lift up to five pounds but no bending, pushing or pulling.  Patients should do nothing called work or exercise until the follow up visit.  Patients should seek further activity limits at the time of their appointment.    APPOINTMENT  Please call our office for an appointment within five to ten days of discharge.  Any fever greater than 100.5, chills, nausea, vomiting, or severe diarrhea please call our office.  If the wound turns red, hot, swollen, becomes increasingly painful, or drains pus call us immediately at [phone].  For life threatening emergencies call [phone].  For non-emergent care please call our office after 8:30 a.m. Monday through Friday.  The number listed above is our office number.  Our phone automatically switches to our answering  service if we are not there.  Please do not use the emergency room for non-urgent care.    Thank you for entrusting us with your care.  [facility]--General Surgery       Please avoid Ibuprofen, Motrin, Aleve, Excedrin, etc. Ok to take Tylenol for minor aches and pains.     Low fiber diet    MRI of the Abdomen to be done as outpatient. Please call to make appointment..

## 2025-04-14 NOTE — ANESTHESIA PROCEDURE NOTES
Airway  Date/Time: 4/14/2025 11:41 AM  Reason: elective    Airway not difficult    General Information and Staff   Patient location during procedure: OR  Anesthesiologist: Nicky Tracy MD  Performed: anesthesiologist   Performed by: Nicky Tracy MD  Authorized by: Nicky Tracy MD        Indications and Patient Condition  Indications for airway management: anesthesia  Sedation level: deep      Preoxygenated: yesPatient position: sniffing    Mask difficulty assessment: 0 - not attempted    Final Airway Details    Final airway type: endotracheal airway    Successful airway: ETT  Cuffed: yes   Successful intubation technique: Video laryngoscopy  Facilitating devices/methods: rapid sequence intubation  Endotracheal tube insertion site: oral  Blade: GlideScope  Blade size: #3  ETT size (mm): 8.0    Cormack-Lehane Classification: grade I - full view of glottis  Placement verified by: capnometry   Measured from: lips  ETT to lips (cm): 24  Number of attempts at approach: 1

## 2025-04-14 NOTE — PLAN OF CARE
Alert and oriented. Desats to 83% room air.2 LNC at night reports history of sleep apnea no Cpap at home. Denies nausea/ vomiting. Report mild abdominal pain , declined pain meds. NPO for capsule study. Afebrile. IV antibiotic continues.   Problem: HEMATOLOGIC - ADULT  Goal: Maintains hematologic stability  Description: INTERVENTIONS- Assess for signs and symptoms of bleeding or hemorrhage- Monitor labs and vital signs for trends- Administer supportive blood products/factors, fluids and medications as ordered and appropriate- Administer supportive blood products/factors as ordered and appropriate  Outcome: Progressing

## 2025-04-14 NOTE — PROGRESS NOTES
[facility]  Progress Note    Moises Olsen Patient Status:  Inpatient    7/15/1960 MRN [id number]   Location [facility] 4NW-A Attending Lili Morrissey MD   Hosp Day # 1 PCP Osiel Rader MD     Subjective:  He is seen and examined resting in bed. He reports continued abdominal pain today. He denies nausea or vomiting. He denies fever or chills.     Lap appy canceled yesterday secondary to anemia.  Hemoglobin 7.9 this morning, up from 7.3.   Objective/Physical Exam:  /68 (BP Location: Left arm)   Pulse 62   Temp 97.9 °F (36.6 °C) (Oral)   Resp 19   Ht 5' 7\" (1.702 m)   Wt 233 lb 0.4 oz (105.7 kg)   SpO2 98%   BMI 36.50 kg/m²     Intake/Output Summary (Last 24 hours) at 2025 0744  Last data filed at 2025 1620  Gross per 24 hour   Intake 1252.5 ml   Output --   Net 1252.5 ml         General: awake, Alert,  Cooperative.  No apparent distress.  HEENT: Normocephalic, atraumatic. No scleral icterus   Pulm: no respiratory distress, no increased work of breathing  Abdomen:  Soft, obese, non-distended,mildly tender to palpation, with no rebound or guarding.  No peritoneal signs.  Skin: warm, dry. No jaundice.     Labs:  Lab Results   Component Value Date    WBC 6.5 2025    HGB 7.9 2025    HCT 27.7 2025    .0 2025      Lab Results   Component Value Date    INR 1.30 (H) 2025    INR 1.25 (H) 2025    INR 1.25 (H) 12/15/2024     Lab Results   Component Value Date     2025    K 4.0 2025     2025    CO2 27.0 2025    BUN 12 2025    CREATSERUM 0.89 2025    GLU 89 2025    CA 9.7 2025            Assessment:  Problem List[1]    Acute appendicitis  Acute on chronic blood loss anemia     Plan:  Plan for laparoscopic appendectomy today with Dr. Kecia Mcdonnell  recommends patient receive 1 unit of pRBC prior to surgery today. Repeat hemoglobin following transfusion.   Continue NPO  Analgesics and  antiemetics as needed  Encourage ambulation and up to chair  GI following regarding anemia; appreciate their recommendations  Medical management per primary   Continue IV antibiotics- Zosyn  DVT prophylaxis- on hold secondary to anemia    The patient was discussed with Dr. Mcdonnell.     Odalys Hannah PA-C  4/14/2025  7:44 AM    The patient denies nausea or vomiting.  He reports right lower quadrant abdominal pain.  He denies any evidence of melena, hematochezia or dark stool.  Hemoglobin yesterday 7.3, this morning 7.9.  INR 1.3.  BMP within normal limits  Abdomen is rotund given BMI of 42.6.  Tender to deep palpation in the right lower quadrant.  Negative Rovsing sign.  No evidence of guarding, rebound or percussion tenderness.  CT scan abdomen pelvis performed at the time of admission 2 days ago.  The appendix is noted to be enlarged up to 11 mm with periappendiceal inflammatory changes and enhancement of the mucosa consistent with acute appendicitis.  Sigmoid colonic diverticulosis is also noted.  Initially on admission the patient's hemoglobin was quite low at 4.9.  He has received 1 unit of blood thus far and his hemoglobin this morning 7.9.    Surgical treatment options for acute appendicitis were also discussed.  At this time the recommendation is to proceed with laparoscopic appendectomy for acute appendicitis.  As the patient will be undergoing surgery today and there will be some blood loss associated with this procedure, the patient will be given an additional unit of packed RBCs prior to surgery.  Moises is comfortable with this treatment plan.  He has no further questions at this time and will proceed as discussed  Postoperatively, GI service on consult and will proceed with workup for GI bleed.  Patient does have a history of chronic peptic ulcer disease and was admitted for anemia earlier this year as well.  The risks, benefits, complications, possible outcomes and alternatives of the procedure were  explained to the patient in detail.  Potential complications of this procedure and as with any surgical procedure and anesthesia were reviewed including but not limited to bleeding, infection, thromboembolic event, pneumonia were discussed.  Expected postoperative pain, recuperation and postoperative course and possible complications were also reviewed.  All questions from the patient were answered in detail.  The patient did verbalize understanding and does not have any further questions at this time.  The patient does wish to proceed with the proposed procedure.    I agree with the note above and attest to its accuracy with the following changes below after my interview and examination of the patient    The patient was seen and examined.  Available labs and radiology is noted.    Carla Mcdonnell MD FACS    Please note that this report has been produced using speech recognition software and may contain errors related to that system including but not limited to errors in grammar, punctuation and spelling as well as words and phrases that possibly may have been recognized inappropriately.  If there are any questions or concerns please contact the dictating provider for clarification.    The 21st Century Cures Act makes medical notes like these available to patients in the interest of trans parency. Please be advised this is a medical document. Medical documents are intended to carry relevant information, facts as evident, and the clinical opinion of the practitioner. The medical note is intended as peer to peer communication and may appear blunt or direct. It is written in medical language and may contain abbreviations or verbiage that are unfamiliar.   Again if there are any questions or concerns please contact the dictating provider for clarification.                [1]   Patient Active Problem List  Diagnosis    Gastrointestinal hemorrhage    Gastrointestinal hemorrhage, unspecified gastrointestinal hemorrhage  type    Iron deficiency anemia due to chronic blood loss    Renal insufficiency    Accelerated hypertension    High blood cholesterol    History of GI bleed    Upper GI bleed    Hypertension    Undiagnosed cardiac murmurs    Melena    AS (atherosclerosis)    Atherosclerosis of abdominal aorta    Dyslipidemia    Dyspnea on exertion    Elevated coronary artery calcium score    Snoring    Vitamin D deficiency    Symptomatic anemia    Microcytic anemia    Guaiac positive stools    Acute appendicitis, unspecified acute appendicitis type    Iron deficiency anemia secondary to blood loss (chronic)    Iron refractory iron deficiency anemia

## 2025-04-14 NOTE — OPERATIVE REPORT
[facility]  Operative Note    Moises Olsen Location: OR   [facility] [id number] MRN [id number]   Admission Date 4/12/2025 Operation Date 4/14/2025   Attending Physician Lili Morirssey MD Operating Physician Carla Mcdonnell MD     Date of procedure:   4-    Pre-Operative Diagnosis: Acute appendicitis with localized peritonitis    Indication for Surgery: Acute appendicitis    Post-Operative Diagnosis: Same as above-liver clinically appears nodular consistent with cirrhosis-images taken    Procedure Performed: Laparoscopic appendectomy    Surgeons and Role:     * Carla Mcdonnell MD - Primary    Anesthesia: General    History of Present Illness:         64-year-old gentleman who was admitted through [facility] emergency department with complaints of dizziness, severe anemia and CT scan findings consistent with acute appendicitis.  This morning the patient denies nausea or vomiting.  He reports right lower quadrant abdominal pain.  He denies any evidence of melena, hematochezia or dark stool.  Hemoglobin yesterday 7.3, this morning 7.9.  INR 1.3.  BMP within normal limits  Abdomen is rotund given BMI of 42.6.  Tender to deep palpation in the right lower quadrant.  Negative Rovsing sign.  No evidence of guarding, rebound or percussion tenderness.  CT scan abdomen pelvis performed at the time of admission 2 days ago.  The appendix is noted to be enlarged up to 11 mm with periappendiceal inflammatory changes and enhancement of the mucosa consistent with acute appendicitis.  Sigmoid colonic diverticulosis is also noted.  Initially on admission the patient's hemoglobin was quite low at 4.9.  He has received 1 unit of blood thus far and his hemoglobin this morning 7.9.    Surgical treatment options for acute appendicitis were also discussed.  At this time the recommendation is to proceed with laparoscopic appendectomy for acute appendicitis.  As the patient will be undergoing surgery today and there will be some blood  loss associated with this procedure, the patient will be given an additional unit of packed RBCs prior to surgery.  Moises is comfortable with this treatment plan.  He has no further questions at this time and will proceed as discussed  Postoperatively, GI service on consult and will proceed with workup for GI bleed.  Patient does have a history of chronic peptic ulcer disease and was admitted for anemia earlier this year as well.  He has no further questions at this time and wishes to proceed with surgery today as discussed      Discussed with patient:  The potential treatment options, risks and benefits of the procedure were discussed in detail with the patient including but not limited to bleeding, infection, seroma/ hematoma formation, postoperative wound complications including development of a hernia, possible trocar injury and injury to other internal organs, possible removal of a normal appendix, possible need for a drain, possible conversion to open appendectomy, postoperative pneumonia, postoperative thromboembolic event including PE and DVT.  These and other potential intraoperative and perioperative risks were reviewed.  The patient states understanding and does not have any further questions at this time and does wish to proceed with surgery.    Summary of case: The patient was taken to the operating room and was placed on the OR table in the supine position. After the induction of general anesthesia perioperative antibiotics were given. The patient was prepped and draped in usual sterile fashion. Using local anesthesia a lavinia-umbilical incision was made and the anterior abdominal fascia was elevated with a Grupo forcep.  The Veress needle was introduced into the abdomen and the abdomen was insufflated to 15 mmHg.  The Veress needle was exchanged for a 11 mm port. Under direct vision 2 accessory ports were placed without incidence. The patient was turned into a Trendelenburg position with the right side  up.  The liver was noted to be diffusely nodular and cirrhotic.  Images were taken and included in the patient's chart the cecum was identified and this was swept medially to visualize the appendix.  The appendix was noted to be indurated, nodular with erythema.  There was no evidence of transmural necrosis, perforation or abscess formation.   The meso-appendix was grasped and elevated towards the anterior abdominal wall a small mesenteric defect was made and the base of the appendix was stapled across and divided.  In a similar fashion the mesoappendix was also stapled across and divided. The appendix was placed into an Endopouch and was withdrawn through the suprapubic port site under direct vision. The trocar was then replaced.  The staple lines across the mesoappendix and base of the appendix was identified and found to be intact. There was no evidence of bleeding. The right lower quadrant and pelvis was copiously irrigated with antibiotic irrigation fluid until the irrigant was clear. The accessory ports were removed under direct vision with no evidence of bleeding from the anterior abdominal wall. The abdomen was deflated. The suprapubic and umbilical ports were closed with 0 Vicryl in a figure-of-eight fascial stitch. All skin incisions were closed with 4-0 Vicryl in a subcuticular manner Steri-Strips and sterile dressing were placed.  All sponge instrument and needle counts were correct at the conclusion of the procedure. The patient was extubated in the operating room and was taken to the recovery room in stable condition.  Postoperatively, the patient's daughter Evelyn, who is a nurse at [employer] on the third floor and was contacted.  Intraoperative findings were discussed including the finding of clinical hepatic nodularity which may reflect cirrhosis of the liver.  The patient's other daughter Kateryna was also contacted however there was no answer and a message was left on her voicemail.      EBL:  Minimal    Pathologic Specimen: Appendix    Drains:  None      Carla Mcdonnell MD      Please note that this report has been produced using speech recognition software and may contain errors related to that system including but not limited to errors in grammar, punctuation and spelling as well as words and phrases that possibly may have been recognized inappropriately.  If there are any questions or concerns please contact the dictating provider for clarification.  The 21st Century Cures Act makes medical notes like these available to patients in the interest of transparency. Please be advised this is a medical document. Medical documents are intended to carry relevant information, facts as evident, and the clinical opinion of the practitioner. The medical note is intended as peer to peer communication and may appear blunt or direct. It is written in medical language and may contain abbreviations or verbiage that are unfamiliar.  If there are any questions or concerns please contact the dictating provider for clarification.

## 2025-04-15 ENCOUNTER — ANESTHESIA (OUTPATIENT)
Dept: ENDOSCOPY | Facility: HOSPITAL | Age: 65
End: 2025-04-15
Payer: COMMERCIAL

## 2025-04-15 LAB
BASOPHILS # BLD AUTO: 0.02 X10(3) UL (ref 0–0.2)
BASOPHILS NFR BLD AUTO: 0.2 %
BLOOD TYPE BARCODE: 5100
EOSINOPHIL # BLD AUTO: 0 X10(3) UL (ref 0–0.7)
EOSINOPHIL NFR BLD AUTO: 0 %
ERYTHROCYTE [DISTWIDTH] IN BLOOD BY AUTOMATED COUNT: 28.6 %
HCT VFR BLD AUTO: 25.7 % (ref 39–53)
HGB BLD-MCNC: 7.4 G/DL (ref 13–17.5)
HGBA1C: 5.9 %
IMM GRANULOCYTES # BLD AUTO: 0.05 X10(3) UL (ref 0–1)
IMM GRANULOCYTES NFR BLD: 0.6 %
LYMPHOCYTES # BLD AUTO: 0.57 X10(3) UL (ref 1–4)
LYMPHOCYTES NFR BLD AUTO: 6.9 %
MCH RBC QN AUTO: 20.4 PG (ref 26–34)
MCHC RBC AUTO-ENTMCNC: 28.8 G/DL (ref 31–37)
MCV RBC AUTO: 71 FL (ref 80–100)
MONOCYTES # BLD AUTO: 0.86 X10(3) UL (ref 0.1–1)
MONOCYTES NFR BLD AUTO: 10.4 %
NEUTROPHILS # BLD AUTO: 6.79 X10 (3) UL (ref 1.5–7.7)
NEUTROPHILS # BLD AUTO: 6.79 X10(3) UL (ref 1.5–7.7)
NEUTROPHILS NFR BLD AUTO: 81.9 %
PLATELET # BLD AUTO: 212 10(3)UL (ref 150–450)
PLATELETS.RETICULATED NFR BLD AUTO: 6.7 % (ref 0–7)
RBC # BLD AUTO: 3.62 X10(6)UL (ref 4.3–5.7)
UNIT VOLUME: 350 ML
WBC # BLD AUTO: 8.3 X10(3) UL (ref 4–11)

## 2025-04-15 PROCEDURE — 99232 SBSQ HOSP IP/OBS MODERATE 35: CPT | Performed by: HOSPITALIST

## 2025-04-15 PROCEDURE — 0DB98ZX EXCISION OF DUODENUM, VIA NATURAL OR ARTIFICIAL OPENING ENDOSCOPIC, DIAGNOSTIC: ICD-10-PCS | Performed by: INTERNAL MEDICINE

## 2025-04-15 DEVICE — REPLAY HEMOSTASIS CLIP, 11MM SPAN
Type: IMPLANTABLE DEVICE | Site: DUODENAL | Status: FUNCTIONAL
Brand: REPLAY

## 2025-04-15 RX ORDER — SODIUM CHLORIDE, SODIUM LACTATE, POTASSIUM CHLORIDE, CALCIUM CHLORIDE 600; 310; 30; 20 MG/100ML; MG/100ML; MG/100ML; MG/100ML
INJECTION, SOLUTION INTRAVENOUS CONTINUOUS PRN
Status: DISCONTINUED | OUTPATIENT
Start: 2025-04-15 | End: 2025-04-15 | Stop reason: SURG

## 2025-04-15 RX ORDER — LIDOCAINE HYDROCHLORIDE 10 MG/ML
INJECTION, SOLUTION EPIDURAL; INFILTRATION; INTRACAUDAL; PERINEURAL AS NEEDED
Status: DISCONTINUED | OUTPATIENT
Start: 2025-04-15 | End: 2025-04-15 | Stop reason: SURG

## 2025-04-15 RX ADMIN — SODIUM CHLORIDE, SODIUM LACTATE, POTASSIUM CHLORIDE, CALCIUM CHLORIDE: 600; 310; 30; 20 INJECTION, SOLUTION INTRAVENOUS at 13:06:00

## 2025-04-15 RX ADMIN — LIDOCAINE HYDROCHLORIDE 50 MG: 10 INJECTION, SOLUTION EPIDURAL; INFILTRATION; INTRACAUDAL; PERINEURAL at 13:10:00

## 2025-04-15 NOTE — ANESTHESIA POSTPROCEDURE EVALUATION
[facility]    Moises Olsen Patient Status:  Inpatient   Age/Gender 64 year old male MRN [id number]   Location [facility] Attending Lili Morrissey MD   Hosp Day # 2 PCP Osiel Rader MD       Anesthesia Post-op Note    ESOPHAGOGASTRODUODENOSCOPY (EGD) WITH BIOPSIES AND CLIP PLACEMENT X2    Procedure Summary       Date: 04/15/25 Room / Location:  ENDOSCOPY 04 /  ENDOSCOPY    Anesthesia Start: 1305 Anesthesia Stop: 1336    Procedure: ESOPHAGOGASTRODUODENOSCOPY (EGD) WITH BIOPSIES AND CLIP PLACEMENT X2 Diagnosis: (DUODENAL ULCER)    Surgeons: Jacques Mack MD Anesthesiologist: Josué Ann MD    Anesthesia Type: MAC ASA Status: 3            Anesthesia Type: MAC    Vitals Value Taken Time   /71 04/15/25 13:37        Pulse 66 04/15/25 13:37   Resp 16 04/15/25 13:37   SpO2 98 % 04/15/25 13:37   Vitals shown include unfiled device data.        Patient Location: Endoscopy    Anesthesia Type: MAC    Airway Patency: patent    Postop Pain Control: adequate    Mental Status: mildly sedated but able to meaningfully participate in the post-anesthesia evaluation    Nausea/Vomiting: none    Cardiopulmonary/Hydration status: stable euvolemic    Complications: no apparent anesthesia related complications    Postop vital signs: stable    Dental Exam: Unchanged from Preop    Patient to be discharged from PACU when criteria met.

## 2025-04-15 NOTE — OPERATIVE REPORT
Operative Report-Esophagogastroduodenoscopy      PREOPERATIVE DIAGNOSIS/INDICATION: GILMA    POSTOPERTATIVE DIAGNOSIS: Duodenal ulcers    PROCEDURE PERFORMED: EGD    INFORMED CONSENT: Once a brief history and physical examination was performed, the risks, benefits and alternatives to the procedure were discussed with the patient and/or family and informed consent was obtained.  The risks of sedation, perforation, missed lesions and need for surgery were all discussed.  Patient expressed understanding of the risks and agreed to proceed.      PROCEDURE DESCRIPTION:  The patient was then brought to the endoscopy suite where his/her pulse, pulse oximetry and blood pressure were monitored. He/she was placed in the left lateral decubitus position and deep sedation was administered. Once adequate sedation was achieved, a bite block was placed and a lubricated tip of an Olympus video upper endoscope was inserted through the oropharynx and gently manipulated through the esophagus into the stomach and the distal duodenum. Upon withdrawal of the endoscope, careful visualization of the mucosa was performed.     FINDINGS:    ESOPHAGUS: Normal esophagus.  GE junction at 38 cm.  STOMACH: Normal stomach.  Normal stomach on retroflexion.  DUODENUM: 4 mm linear clean-based ulceration of the duodenal bulb with no active bleeding or high risk stigmata.  Biopsies taken with cold forceps for histology.  Clip x 2 applied for hemostasis.  Otherwise normal duodenum.    THERAPEUTICS: Biopsies were performed.    RECOMMENDATIONS:     Post EGD precautions, watch for bleeding, infection, perforation, adverse drug reactions   Follow biopsies.  Pantoprazole 40 mg qday  ERIC Mack MD  4/15/2025  1:25 PM

## 2025-04-15 NOTE — PROGRESS NOTES
[facility]  Progress Note    Moises Olsen Patient Status:  Inpatient    7/15/1960 MRN [id number]   Location [facility] 4NW-A Attending Lili Morrissey MD   Hosp Day # 2 PCP Osiel Rader MD     Subjective:  He is seen and examined resting in bed. He reports overall feeling well this morning. He reports abdominal soreness. He reports tolerating clear liquid diet yesterday without nausea or vomiting. He denies passing flatus. He reports he has not ambulated since surgery    Hemoglobin 7.8 --> 7.4.   Objective/Physical Exam:  /60 (BP Location: Right arm)   Pulse 62   Temp 97.8 °F (36.6 °C) (Oral)   Resp 22   Ht 5' 7\" (1.702 m)   Wt 233 lb 0.4 oz (105.7 kg)   SpO2 93%   BMI 36.50 kg/m²     Intake/Output Summary (Last 24 hours) at 4/15/2025 0938  Last data filed at 4/15/2025 0453  Gross per 24 hour   Intake 2130 ml   Output 60 ml   Net 2070 ml         General: Awake, Alert, Cooperative.  No apparent distress.  HEENT: Normocephalic, atraumatic.  Pulm: no respiratory distress, no increased work of breathing  Abdomen:  Soft, mildly distended,appropriate incisional tenderness, with no rebound or guarding.  No peritoneal signs.  Incision:  Clean, dry, intact without erythema.  Steri strips and bandaid in place      Labs:  Lab Results   Component Value Date    WBC 8.3 04/15/2025    HGB 7.4 04/15/2025    HCT 25.7 04/15/2025    .0 04/15/2025      Lab Results   Component Value Date    INR 1.30 (H) 2025    INR 1.25 (H) 2025    INR 1.25 (H) 12/15/2024               Assessment:  Problem List[1]    POD 1 laparoscopic appendectomy   Acute on chronic blood loss anemia     Plan:  Patient currently NPO pending workup with GI  Anemia workup per GI recommendations; noted plan for MR enterography and video capsule study today.  Encourage ambulation and up to chair  Analgesics and antiemetics as needed  Continue IV antibiotics with zosyn   Medical management per primary   DVT prophylaxis with  heparin  GI prophylaxis with protonix      Odalys Hannah PA-C  4/15/2025  9:43 AM    POD 1 status post laparoscopic appendectomy.  Intraoperative findings discussed in detail with the patient.  He has no further questions at this time.  Continue IV antibiotics.  Hemoglobin this morning 7.4.  Yesterday 7.8.  Further workup for anemia per GI service.  Patient may advance diet when cleared by GI service    I agree with the note above and attest to its accuracy with the following changes below after my interview and examination of the patient    The patient was seen and examined.  Available labs and radiology is noted.    Carla Mcdonnell MD FACS    Please note that this report has been produced using speech recognition software and may contain errors related to that system including but not limited to errors in grammar, punctuation and spelling as well as words and phrases that possibly may have been recognized inappropriately.  If there are any questions or concerns please contact the dictating provider for clarification.    The 21st Century Cures Act makes medical notes like these available to patients in the interest of trans parency. Please be advised this is a medical document. Medical documents are intended to carry relevant information, facts as evident, and the clinical opinion of the practitioner. The medical note is intended as peer to peer communication and may appear blunt or direct. It is written in medical language and may contain abbreviations or verbiage that are unfamiliar.   Again if there are any questions or concerns please contact the dictating provider for clarification.                [1]   Patient Active Problem List  Diagnosis    Gastrointestinal hemorrhage    Gastrointestinal hemorrhage, unspecified gastrointestinal hemorrhage type    Iron deficiency anemia due to chronic blood loss    Renal insufficiency    Accelerated hypertension    High blood cholesterol    History of GI bleed    Upper GI  bleed    Hypertension    Undiagnosed cardiac murmurs    Melena    AS (atherosclerosis)    Atherosclerosis of abdominal aorta    Dyslipidemia    Dyspnea on exertion    Elevated coronary artery calcium score    Snoring    Vitamin D deficiency    Symptomatic anemia    Microcytic anemia    Guaiac positive stools    Acute appendicitis, unspecified acute appendicitis type    Iron deficiency anemia secondary to blood loss (chronic)    Iron refractory iron deficiency anemia

## 2025-04-15 NOTE — ANESTHESIA PREPROCEDURE EVALUATION
PRE-OP EVALUATION    Patient Name: Moises Olsen    Admit Diagnosis: Microcytic anemia [D50.9]  Guaiac positive stools [R19.5]  Symptomatic anemia [D64.9]  Acute appendicitis, unspecified acute appendicitis type [K35.80]    Pre-op Diagnosis: anemia    ESOPHAGOGASTRODUODENOSCOPY (EGD) WITH BIOPSIES AND CLIP PLACEMENT X2    Anesthesia Procedure: ESOPHAGOGASTRODUODENOSCOPY (EGD) WITH BIOPSIES AND CLIP PLACEMENT X2    Surgeons and Role:     * Jacques Mack MD - Primary    Pre-op vitals reviewed.  Temp: 97.8 °F (36.6 °C)  Pulse: 64  Resp: 20  BP: 143/71  SpO2: 94 %  Body mass index is 36.5 kg/m².    Current medications reviewed.  Hospital Medications:  Current Medications[1]    Outpatient Medications:   Prescriptions Prior to Admission[2]    Allergies: Patient has no known allergies.      Anesthesia Evaluation    Patient summary reviewed.    Anesthetic Complications  (-) history of anesthetic complications         GI/Hepatic/Renal             (+) chronic renal disease                    Cardiovascular        Exercise tolerance: good     MET: >4    (+) obesity  (+) hypertension   (+) hyperlipidemia                                  Endo/Other      (+) diabetes         (+) anemia                   Pulmonary    Negative pulmonary ROS.                       Neuro/Psych    Negative neuro/psych ROS.                                  Past Surgical History[3]  Social Hx on file[4]  History   Drug Use Unknown     Available pre-op labs reviewed.  Lab Results   Component Value Date    WBC 8.3 04/15/2025    RBC 3.62 (L) 04/15/2025    HGB 7.4 (L) 04/15/2025    HCT 25.7 (L) 04/15/2025    MCV 71.0 (L) 04/15/2025    MCH 20.4 (L) 04/15/2025    MCHC 28.8 (L) 04/15/2025    RDW 28.6 04/15/2025    .0 04/15/2025     Lab Results   Component Value Date     04/14/2025    K 4.0 04/14/2025     04/14/2025    CO2 27.0 04/14/2025    BUN 12 04/14/2025    CREATSERUM 0.89 04/14/2025    GLU 89 04/14/2025    CA 9.7 04/14/2025     Lab  Results   Component Value Date    INR 1.30 (H) 04/12/2025         Airway      Mallampati: III  Mouth opening: >3 FB  TM distance: > 6 cm  Neck ROM: full Cardiovascular    Cardiovascular exam normal.  Rhythm: regular  Rate: normal     Dental    Dentition appears grossly intact         Pulmonary    Pulmonary exam normal.  Breath sounds clear to auscultation bilaterally.               Other findings              ASA: 3   Plan: MAC  NPO status verified and patient meets guidelines.  Patient has not taken beta blockers in last 24 hours.  Post-procedure pain management plan discussed with surgeon and patient.      Plan/risks discussed with: patient                Present on Admission:   Hypertension   High blood cholesterol             [1]    [COMPLETED] sodium chloride 0.9% infusion   Intravenous Once    sodium ferric gluconate (Ferrlecit) 125 mg in sodium chloride 0.9% 100mL IVPB premix  125 mg Intravenous Daily    pantoprazole (Protonix) 40 mg in sodium chloride 0.9% PF 10 mL IV push  40 mg Intravenous Q24H    heparin (Porcine) 5000 UNIT/ML injection 5,000 Units  5,000 Units Subcutaneous Q8H BISI    acetaminophen (Tylenol Extra Strength) tab 1,000 mg  1,000 mg Oral Q8H BISI    oxyCODONE immediate release tab 5 mg  5 mg Oral Q4H PRN    HYDROmorphone (Dilaudid) 1 MG/ML injection 0.4 mg  0.4 mg Intravenous Q2H PRN    Or    HYDROmorphone (Dilaudid) 1 MG/ML injection 0.8 mg  0.8 mg Intravenous Q2H PRN    ondansetron (Zofran) 4 MG/2ML injection 4 mg  4 mg Intravenous Q6H PRN    prochlorperazine (Compazine) 10 MG/2ML injection 5 mg  5 mg Intravenous Q8H PRN    metoprolol succinate ER (Toprol XL) 24 hr tab 25 mg  25 mg Oral Daily Beta Blocker    rosuvastatin (Crestor) tab 40 mg  40 mg Oral Daily    [COMPLETED] sodium ferric gluconate (Ferrlecit) 125 mg in sodium chloride 0.9% 100mL IVPB premix  125 mg Intravenous Once    folic acid (Folvite) tab 1 mg  1 mg Oral Daily    piperacillin-tazobactam (Zosyn) 3.375 g in dextrose 5% 100  mL IVPB-ADDV  3.375 g Intravenous Q8H    [COMPLETED] iopamidol 76% (ISOVUE-370) injection for power injector  100 mL Intravenous ONCE PRN    [COMPLETED] piperacillin-tazobactam (Zosyn) 4.5 g in dextrose 5% 100 mL IVPB-ADDV  4.5 g Intravenous Once   [2]   Medications Prior to Admission   Medication Sig Dispense Refill Last Dose/Taking    rosuvastatin 40 MG Oral Tab Take 1 tablet (40 mg total) by mouth in the morning.   2025 Evening    pantoprazole 40 MG Oral Tab EC Take 1 tablet (40 mg total) by mouth every morning before breakfast.   2025 Morning    Losartan Potassium-HCTZ 100-25 MG Oral Tab Take 1 tablet by mouth daily. 90 tablet 1 2025 Morning    metoprolol succinate ER 25 MG Oral Tablet 24 Hr TAKE A HALF (1/2) TABLET BY MOUTH DAILY WITH A MEAL       losartan 50 MG Oral Tab 1 tablet (50 mg total).       Tadalafil 10 MG Oral Tab Take 1 tablet (10 mg total) by mouth daily as needed for Erectile Dysfunction. 20 tablet 0     Ferrous Sulfate 325 (65 Fe) MG Oral Tab Take 1 tablet (325 mg total) by mouth every other day. 60 tablet 0     [] Bismuth Subsalicylate 525 MG Oral Tab Take 1 tablet by mouth in the morning and 1 tablet before bedtime. Do all this for 10 days. 20 tablet 0     [] Tetracycline HCl 500 MG Oral Cap Take 1 capsule (500 mg total) by mouth 4 (four) times daily for 10 days. 40 capsule 0     [] metroNIDAZOLE 250 MG Oral Tab Take 1 tablet (250 mg total) by mouth 4 (four) times daily for 10 days. 40 tablet 0     atorvastatin 40 MG Oral Tab Take 1 tablet (40 mg total) by mouth nightly. 90 tablet 1    [3]   Past Surgical History:  Procedure Laterality Date    Colonoscopy N/A 2025    Procedure: COLONOSCOPY with cold snare polypectomy;  Surgeon: Geoffrey Milan MD;  Location:  ENDOSCOPY   [4]   Social History  Socioeconomic History    Marital status: Single   Tobacco Use    Smoking status: Former     Current packs/day: 0.50     Average packs/day: 0.5 packs/day for  30.0 years (15.0 ttl pk-yrs)     Types: Cigarettes    Smokeless tobacco: Current    Tobacco comments:     Nicotine disposable vape use    Vaping Use    Vaping status: Every Day    Substances: Nicotine    Devices: Disposable, Pt states he is trying to quit   Substance and Sexual Activity    Alcohol use: Not Currently    Drug use: Never

## 2025-04-15 NOTE — PAYOR COMM NOTE
--------------  ADMISSION REVIEW     Payor: [employer]  Subscriber #:  [id number]  Authorization Number: [id number]    Admit date: 4/13/25  Admit time:  1:14 AM       REVIEW DOCUMENTATION:  ED Provider Notes signed by Tona Choi DO at 4/13/2025 12:24 AM       Author: Tona Choi DO Service: -- Author Type: Physician    Filed: 4/13/2025 12:24 AM Date of Service: 4/12/2025 10:05 PM Status: Signed     Patient Seen in: [facility] Emergency Department    History     Chief Complaint   Patient presents with    Abnormal Labs     Stated Complaint: was called to come get blood transfusion; hgb 4.6    Patient is 64-year-old male who presents emergency room for low hemoglobin.  Patient has seen his doctor for shortness of breath and fatigue.  Patient had outpatient labs that showed hemoglobin of 4.6.  Patient has required blood transfusion back in December for similar episode.  He had bleeding from his small intestine at that time.  Patient reports no bloody stools no black stool since that time however on guaiac exam patient is guaiac positive.  He has not any blood thinners.  Patient does not take Motrin or any other NSAIDs.    The history is provided by the patient.     Physical Exam     ED Triage Vitals [04/12/25 2143]   /69   Pulse 80   Resp 20   Temp 98.1 °F (36.7 °C)   Temp src Temporal   SpO2 98 %   O2 Device None (Room air)     Vital Signs  BP: 152/68  Pulse: 73  Resp: 17  Temp: 98.1 °F (36.7 °C)  Temp src: Temporal  MAP (mmHg): 88    Oxygen Therapy  SpO2: 100 %  O2 Device: None (Room air)    Physical Exam  Vitals and nursing note reviewed. Exam conducted with a chaperone present.   Constitutional:       General: He is not in acute distress.     Appearance: Normal appearance. He is obese. He is not ill-appearing, toxic-appearing or diaphoretic.   HENT:      Head: Normocephalic and atraumatic.      Mouth/Throat:      Mouth: Mucous membranes are moist.   Eyes:      Extraocular Movements:  Extraocular movements intact.      Pupils: Pupils are equal, round, and reactive to light.      Comments: Pale conjunctiva   Cardiovascular:      Rate and Rhythm: Normal rate and regular rhythm.      Pulses: Normal pulses.   Pulmonary:      Effort: Pulmonary effort is normal. No respiratory distress.      Breath sounds: Normal breath sounds. No wheezing.   Abdominal:      General: Bowel sounds are normal. There is no distension.      Palpations: Abdomen is soft.      Tenderness: There is no abdominal tenderness.   Genitourinary:     Rectum: Guaiac result positive.   Musculoskeletal:         General: Normal range of motion.   Skin:     General: Skin is warm.      Capillary Refill: Capillary refill takes less than 2 seconds.      Coloration: Skin is pale.   Neurological:      General: No focal deficit present.      Mental Status: He is alert and oriented to person, place, and time.   Psychiatric:         Mood and Affect: Mood normal.         Behavior: Behavior normal.     ED Course     Labs Reviewed   COMP METABOLIC PANEL (14) - Abnormal; Notable for the following components:       Result Value    Glucose 105 (*)     Calculated Osmolality 298 (*)     ALT 7 (*)     All other components within normal limits   CBC WITH DIFFERENTIAL WITH PLATELET - Abnormal; Notable for the following components:    RBC 2.96 (*)     HGB 4.7 (*)     HCT 18.5 (*)     MCV 62.5 (*)     MCH 15.9 (*)     MCHC 25.4 (*)     All other components within normal limits   PROTHROMBIN TIME (PT) - Abnormal; Notable for the following components:    PT 16.4 (*)     INR 1.30 (*)     All other components within normal limits   PTT, ACTIVATED - Normal   TYPE AND SCREEN   ABORH (BLOOD TYPE)   ANTIBODY SCREEN   PREPARE RBC     CT ABDOMEN+PELVIS(ALL W+WO)(CPT=74178)  Result Date: 4/12/2025  CONCLUSION:   1.  Findings consistent with acute appendicitis without abscess findings were discussed with Dr. Choi  2. Diverticulosis of the sigmoid colon without  diverticulitis.       MDM      Family History-noncontributory  Past Medical History-anemia, hypertension, hyperlipidemia, diabetes    Differential diagnosis before testing included rectal bleeding, anemia, lab error    Co-morbidities that add to the complexity of management include: Recent need for transfusion in December    Testing ordered during this visit included baseline labs type and screen CT scan of the abdomen    Radiographic images  I personally reviewed the radiographs and my individual interpretation shows acute appendicitis  I also reviewed the official reports.    External chart review showed review of Care Everywhere in [facility] system shows patient was admitted for and transfused back in December of this last year    History obtained by an independent source included from patient    Discussion of management with patient, hospitalist    Medications Provided: Blood products    Course of Events during Emergency Room Visit include 64-year-old male presents emergency room for low hemoglobin level recheck the lab to show accuracy however given patient's paleness and history suspect it is accurate.  If accurate will transfuse.  Given patient will probably need 3 to 4 units of blood to get over 8 he will need admission for transfusion.  Patient will get a CT scan here.  His guaiac was trace positive on exam.  Patient discussed with hospitalist.    I had 1147 call from radiology informing me the patient has an acute appendicitis.  We were not expecting that on findings.  Patient denies any pain he denies any fever or vomiting.  He has had some back pain.  I will speak with general surgery of plan for admission will speak with hospitalist    Patient's appendix is visualized on CT it appears to be retropointing.  This would account for the only pain the patient is experience which is mild back pain.  Patient discussed with general surgery they will see him in the morning.  I will start on IV antibiotics.    Patient  discussed with hospitalist plan for admission.  Patient's vital signs are stable I do not think he requires ICU at this time.  He is completely asymptomatic regarding the appendicitis other than some mild back pain.  Patient is attributing that to the bed in the ER..  They want the patient to have GYN consult will page them.    Patient discussed with GI.  No further orders at this time    Disposition:  Admission  I have discussed with the patient the results of test, differential diagnosis, and treatment plan. They expressed clear understanding of these instructions and agrees to the plan provided.     Admission disposition: 4/13/2025 12:24 AM    Medical Decision Making  Disposition and Plan     Clinical Impression:  1. Symptomatic anemia    2. Microcytic anemia    3. Guaiac positive stools    4. Acute appendicitis, unspecified acute appendicitis type       Disposition:  Admit  4/13/2025 12:24 am    Tona Choi, DO on 4/13/2025 12:24 AM      History & Physical   Chief Complaint: Abnormal labs and labs done as outpatient with low hemoglobin and was sent to the emergency room by outpatient physician     History of Present Illness:  Moises Olsen is a 64 year old male admitted with Abnormal labs and labs done as outpatient with low hemoglobin and was sent to the emergency room by outpatient physician.  Patient complains of some exertional shortness of breath and fatigue.  Patient states he followed up with his outpatient physician who did some labs which showed hemoglobin of 4.6 and was sent to the emergency room.  Patient has had similar history in December when he needed blood transfusions.  Patient denies any abdominal pain.  Denies any chest pain or shortness of breath.  Denies any nausea vomiting or diarrhea.  Denies any blood in stools this time.  Denies any fever or chills.    Blood pressure 119/61, pulse 74, temperature 98.1 °F (36.7 °C), Temporal, resp. rate 18, height 5' 7\" (1.702 m), weight 233 lb (105.7 kg),  SpO2 100%.     Component Value Date     WBC 5.0 04/12/2025     HGB 4.7 04/12/2025     HCT 18.5 04/12/2025     .0 04/12/2025     CREATSERUM 1.18 04/12/2025     BUN 17 04/12/2025      04/12/2025     K 3.8 04/12/2025      04/12/2025     CO2 26.0 04/12/2025      04/12/2025     CA 9.5 04/12/2025     ALB 4.6 04/12/2025     ALKPHO 58 04/12/2025     BILT 0.5 04/12/2025     TP 7.0 04/12/2025     AST 19 04/12/2025     ALT 7 04/12/2025     PTT 34.5 04/12/2025     INR 1.30 04/12/2025     PTP 16.4 04/12/2025      PTP 16.4*   INR 1.30*     ASSESSMENT / PLAN:      #Symptomatic microcytic anemia with a hemoglobin of 4.7  Will transfuse with PRBC  Check iron studies  GI consult  Hematology consult with recurrent symptomatic anemia  PPI  #Acute appendicitis seen on CT abdomen  Surgeon consulted from ER  Started on IV Zosyn from the emergency room which we will continue at this time  Will keep n.p.o., on IV fluids, IV Zofran as needed nausea vomiting, IV pain medications as needed  #Hypertension  Continue home metoprolol  Hold home losartan hydrochlorothiazide at this time  #Prediabetes/mild diabetes mellitus type 2  Hyperglycemia protocol  Follow Accu-Cheks  On chart review last hemoglobin A1c done on 1/17/2025 was 5.7, before that had a hemoglobin A1c on 12/16/2019 which was 6.1 at that time.  #Hyperlipidemia  Continue home statin     DVT Prophylaxis: SCD, no pharmacological prophylaxis due to severe anemia, possibility of occult GI bleed    4/13/2025  [facility] Consultation Note   Reason for Consultation (Chief Complaint)  Iron deficiency anemia.      History of Present Illness  64 year old male who on on 12/15/2024 presented to the ED with with epigastric pain and dark stools. He reported that in 02/2024, he was diagnosed with ulcer disease at [facility]. He also has a previous history of H. pylori positive gastric ulcer in 2017.      Patient presented to ED on 04/12/2025 after  outpatient laboratory studies showed a hemoglobin of 4.6 g/dl. He denied any black stools but stool for occult blood was positive. He reports shortness of breath with exertion. CT abdomen/pelvis w contrast showed findings concerning for acute appendicitis. Ferritin was 2 ng/ml.      He has received 2 units of PRBC.     Patient states that he discontinued oral iron several weeks ago thinking he no longer needed the medication.     /59 (BP Location: Right arm)  Pulse 64  Temp 98.1 °F (36.7 °C) (Oral)  Resp 14  Ht 1.702 m (5' 7\")  Wt 105.7 kg (233 lb 0.4 oz)  SpO2 99%  BMI 36.50 kg/m²     Impression and Plan   Iron deficiency anemia: Due to GI blood losses. GI to evaluate patient to determine if any acute workup is required.      Patient receiving 2 units of PRBC. Hospitalist service can determine if further transfusion needed based upon repeat Hb after transfusion.      From hematology point of view, patient can be discharged if no procedures recommended by GI or surgery and Hb is adequate from point of view of hospitalist service.      I will arrange for IV iron therapy as an outpatient. If patient remains in house, I will give IV iron tomorrow but he does not need to remain in house for such therapy.      Start folic acid 1 mg daily.     Gastroenterology Initial Consultation   Reason for consultation: Anemia  CC: Anemia  HPI: This is a 65 yo male with HTN, dyslipidemia, DM and who also has a history of prior duodenal ulcer disease.  He has been seen by us over the past 4 months related to presentations for epigastric abdominal pain and melena with the finding of recurrent duodenal ulcers.  He was subsequently found to have H.pylori, and recently completed bismuth-based quadruple therapy in February.  The patient was seen in follow-up and had been noted to continue to have a mild down-trending hgb.  He presented yesterday with complaints of progressive weakness, dyspnea on exertion over the past 2 weeks.   He reports no complaints of nausea/vomiting or abdominal pain.  He has had no melena or gross hematochezia.  In the ER, he was noted to have a hgb of 4.6 with subsequent iron studies revealing iron deficiency.  He was to have an outpatient video capsule endoscopy, but insurance did not cover it.    Lab 04/13/25  0444   RBC 3.10*   HGB 5.4*   HCT 20.2*   MCV 65.2*   MCH 17.4*   MCHC 26.7*   RDW 25.9   NEPRELIM 3.09   WBC 5.0   .0     Component  Ref Range & Units (hover) 4/13/25  6:14 AM   Ferritin 3 Low      Component  Ref Range & Units (hover) 4/13/25  6:14 AM   Iron 10 Low    Transferrin 369 High    Total Iron Binding Capacity 455 High    % Saturation 2 Low      Impression: This is a 63 yo male who is presenting with recurrent anemia and profound iron deficiency.  He has had a negative egd and colonoscopy in January and was unable to have the outpatient capsule endoscopy due to insurance issues.  He warrants inpatient video capsule study.  However, his CT also revealed acute appendicitis for which he is expected to have surgery this morning.      Recommendations:   1) PRBC's to achieve hgb > 7  2) No indication for PPI, given recent negative EGD and having completed H.pylori therapy.  3) Stool antigen for H.pylori  4) Hematology service consulted for management of iron deficiency  5) Will plan inpatient capsule study if ok with General Surgery     General Surgery   Reason for Consultation:  Acute appendicitis     Chief Complaint:  Shortness of breath     History of Present Illness:  64 year old male who presents to [facility] on 4/12/2025 with complaints of shortness of breath over the past 2 weeks.  Patient was sent to emergency department for evaluation after outpatient lab work revealed hemoglobin level of 4.6.  Patient states low hemoglobin has been an ongoing issue with him secondary to bleeding duodenal ulcers, he has required multiple transfusions in the past.  He states his last transfusion was  in December 2024.  Patient states he previously completed treatment for H. pylori in February of this year, and he has been taking daily PPI.  He states that since then he has no longer been experiencing blood in his stool. Patient was recommended for capsule endoscopy, has not yet completed.  Patient's last colonoscopy completed January 2025.     The states he has been having normal bowel movements, he denies diarrhea or constipation.  He denies any abdominal pain.  Patient states he does feel bloated today.  He states he has been tolerating foods, denies any nausea or vomiting.  He denies fever or chills.     Upon presentation to the hospital, patient was afebrile and hemodynamically stable.  CBC demonstrating hemoglobin 4.7, HCT 18.5, ,000. RBC 2.96, MCH 15.9, MCHC 25.4, MCV 62.5.  There is no leukocytosis.  BMP within normal limits.  CT A/P revealing enlarged appendix, with periappendiceal inflammatory changes enhancement of mucosa.  Measures up to 11 mm in size, consistent with acute appendicitis.  There is no abscess or free air.  Evidence of diverticular disease in the sigmoid colon, without diverticulitis, no abscess or free air.     Past medical history significant for recurrent duodenal ulcer disease, HTN, dyslipidemia.     Patient denies history of intra-abdominal surgeries.  Patient reports distant history of abdominal skin graft used for repair of finger laceration.    /71 (BP Location: Right arm)  Pulse 67  Temp 98.2 °F (36.8 °C) (Oral)  Resp 17  Ht 67\"  Wt 233 lb 0.4 oz (105.7 kg)  SpO2 100%  BMI 36.50 kg/m²     Lab 04/12/25  1210 04/12/25  2204 04/13/25  0444   RBC 2.92* 2.96* 3.10*   HGB 4.6* 4.7* 5.4*   HCT 18.6* 18.5* 20.2*   MCV 63.7* 62.5* 65.2*   MCH 15.8* 15.9* 17.4*   MCHC 24.7* 25.4* 26.7*   RDW 23.6 23.3 25.9   NEPRELIM 3.19 2.99 3.09   WBC 5.9 5.0 5.0   .0 238.0 236.0     Impression:    presented with likely acute blood loss anemia and concurrent findings  concerning for acute appendicitis.     Due to to profound anemia with a hemoglobin approximately 5 this morning, surgical invention will be deferred until the patient can be appropriately resuscitated and packed red blood cell transfusion completed.     GI workup will require capsule endoscopy per Dr. Milan.     Outpatient iron therapy for hematology.     Will defer surgical intervention until tomorrow to allow for medical optimization, blood transfusion, and resuscitation.     N.p.o., IV fluids, and IV antibiotics today.     Care plan discussed with patient.  All questions answered.     Patient will be scheduled for surgical intervention tomorrow by the acute care service, Dr. Mcdonnell    4/14/2025  Operative Report    Pre-Operative Diagnosis: Acute appendicitis with localized peritonitis     Indication for Surgery: Acute appendicitis     Post-Operative Diagnosis: Same as above-liver clinically appears nodular consistent with cirrhosis-images taken     Procedure Performed: Laparoscopic appendectomy    4/15/2025  General Surgery   POD #1  reports abdominal soreness. He reports tolerating clear liquid diet yesterday without nausea or vomiting. He denies passing flatus. He reports he has not ambulated since surgery     Hemoglobin 7.8 --> 7.4.     /60 (BP Location: Right arm)   Pulse 62   Temp 97.8 °F (36.6 °C) (Oral)   Resp 22   Ht 5' 7\" (1.702 m)   Wt 233 lb 0.4 oz (105.7 kg)   SpO2 93%   BMI 36.50 kg/m²      4/15/2025 0453      Gross per 24 hour   Intake 2130 ml   Output 60 ml   Net 2070 ml     Component Value Date     WBC 8.3 04/15/2025     HGB 7.4 04/15/2025     HCT 25.7 04/15/2025     .0 04/15/2025      Assessment:  POD 1 laparoscopic appendectomy   Acute on chronic blood loss anemia      Plan:  Patient currently NPO pending workup with GI  Anemia workup per GI recommendations; noted plan for MR enterography and video capsule study today.  Encourage ambulation and up to chair  Analgesics and  antiemetics as needed  Continue IV antibiotics with zosyn   Medical management per primary   DVT prophylaxis with heparin  GI prophylaxis with protonix    Operative Report   PREOPERATIVE DIAGNOSIS/INDICATION: GILMA  POSTOPERTATIVE DIAGNOSIS: Duodenal ulcers  PROCEDURE PERFORMED: EGD  FINDINGS:   ESOPHAGUS: Normal esophagus.  GE junction at 38 cm.  STOMACH: Normal stomach.  Normal stomach on retroflexion.  DUODENUM: 4 mm linear clean-based ulceration of the duodenal bulb with no active bleeding or high risk stigmata.  Biopsies taken with cold forceps for histology.  Clip x 2 applied for hemostasis.  Otherwise normal duodenum.  THERAPEUTICS: Biopsies were performed.  RECOMMENDATIONS:   Post EGD precautions, watch for bleeding, infection, perforation, adverse drug reactions   Follow biopsies.  Pantoprazole 40 mg qday  MRE  MEDICATIONS ADMINISTERED:  Medications 04/12/25 04/13/25 04/14/25 04/15/25   acetaminophen (Tylenol Extra Strength) tab 1,000 mg  Dose: 1,000 mg  Freq: Every 8 hours scheduled Route: OR  Start: 04/14/25 1445      1526 IA-Given   2159 CP-Given      (0600 CP)-Not Given   1547 IA-Given     2200         folic acid (Folvite) tab 1 mg  Dose: 1 mg  Freq: Daily Route: OR  Start: 04/13/25 1015     (1015 AF)-Not Given       (0930 IA)-Not Given   1126 UE-MAR Hold     1440 MW-MAR Unhold       0844 IA-Given         glucagon (GlucaGen) injection 1 mg  Dose: 1 mg  Freq: On call Route: IV  Start: 04/15/25 1515 End: 04/16/25 0314   Admin Instructions:   For MRE--to be administered in MRI lab per MR protocol       1515         glucagon (GlucaGen) injection 1 mg  Dose: 1 mg  Freq: On call Route: IV  Start: 04/16/25 0000 End: 04/15/25 1503   Admin Instructions:   For MRE--to be administered in MRI lab per MR protocol       1503-D/C'd       heparin (Porcine) 5000 UNIT/ML injection 5,000 Units  Dose: 5,000 Units  Freq: Every 8 hours scheduled Route: SC  Start: 04/14/25 2015      2159 CP-Given       (0600 CP)-Not Given    (1400 IA)-Not Given [C]     2200         metoprolol succinate ER (Toprol XL) 24 hr tab 25 mg  Dose: 25 mg  Freq: Daily Beta Blocker Route: OR  Start: 04/13/25 0600   Admin Instructions:   Hold if systolic blood pressure less than 100 or pulse less than 60  Do not crush     (0600 LR)-Not Given       0427 CP-Given   1126 UE-MAR Hold     1440 MW-MAR Unhold       (0600 CP)-Not Given         pantoprazole (Protonix) 40 mg in sodium chloride 0.9% PF 10 mL IV push  Dose: 40 mg  Freq: Every 24 hours Route: IV  Start: 04/14/25 0900   Admin Instructions:   Dilute with 10 ml NS; IV push over 2 minutes      0936 IA-Given   1126 UE-MAR Hold     1440 MW-MAR Unhold       0844 IA-Given         pantoprazole (Protonix) DR tab 40 mg  Dose: 40 mg  Freq: Every morning before breakfast Route: OR  Start: 04/13/25 0700 End: 04/13/25 0753   Admin Instructions:   Do not crush     (0700 LR)-Not Given   0753-D/C'd        piperacillin-tazobactam (Zosyn) 3.375 g in dextrose 5% 100 mL IVPB-ADDV  Dose: 3.375 g  Freq: Every 8 hours Route: IV  Last Dose: 3.375 g (04/15/25 1052)  Start: 04/13/25 1200   Admin Instructions:   Incompatible with Lactated Ringers (LR)   Order specific questions:        1347 AF-New Bag   2045 CP-New Bag      0427 CP-New Bag   (1200 IA)-Not Given     1757 IA-New Bag       0200 CP-New Bag   1052 IA-New Bag     1800         piperacillin-tazobactam (Zosyn) 4.5 g in dextrose 5% 100 mL IVPB-ADDV  Dose: 4.5 g  Freq: Once Route: IV  Last Dose: Stopped (04/13/25 0053)  Start: 04/12/25 2356 End: 04/13/25 0053   Admin Instructions:   Incompatible with Lactated Ringers (LR)   Order specific questions:        0025 BG-New Bag   0053 BG-Stopped          rosuvastatin (Crestor) tab 40 mg  Dose: 40 mg  Freq: Daily Route: OR  Start: 04/13/25 0930     (0930 AF)-Not Given       (0930 IA)-Not Given   1126 UE-MAR Hold     1440 MW-MAR Unhold       0844 IA-Given         sodium chloride 0.9% infusion  Freq: Once Route: IV  Start: 04/14/25 0745 End:  04/14/25 0927   Admin Instructions:   Use a 250mL bag: To standard blood administration set with integral filter. Change every 4 hours or after 2 units, whichever comes first. ALERT: NEVER mix any medication or solution with blood or blood products.  Run at KVO rate      0927 IA-New Bag          sodium ferric gluconate (Ferrlecit) 125 mg in sodium chloride 0.9% 100mL IVPB premix  Dose: 125 mg  Freq: Daily Route: IV  Last Dose: 125 mg (04/15/25 0844)  Start: 04/14/25 0830 End: 04/20/25 0929      1126 UE-MAR Hold   1440 MW-MAR Unhold     1526 IA-New Bag       0844 IA-New Bag         sodium ferric gluconate (Ferrlecit) 125 mg in sodium chloride 0.9% 100mL IVPB premix  Dose: 125 mg  Freq: Once Route: IV  Last Dose: 125 mg (04/13/25 1249)  Start: 04/13/25 0845 End: 04/13/25 1349     1249 AF-New Bag             lactated ringers infusion  Rate: 100 mL/hr  Freq: Continuous Route: IV  Last Dose: Stopped (04/14/25 1300)  Start: 04/14/25 1300 End: 04/14/25 1436      1300 IA-Stopped   1436-D/C'd       sodium chloride 0.9% infusion  Rate: 100 mL/hr  Freq: Continuous Route: IV  Start: 04/14/25 1257 End: 04/14/25 1436      1257 PT-Rate/Dose Change [C]   1338 PT-Handoff [C]     1436-D/C'd        sodium chloride 0.9% infusion  Rate: 125 mL/hr  Freq: Continuous Route: IV  Last Dose: 0 mL (04/13/25 0345)  Start: 04/13/25 0145 End: 04/13/25 0344   Admin Instructions:   ED holding order only  Cancel if other admission orders exist!     0145 LR-New Bag   0344-D/C'd  0345 LR-Stopped      1134 VB-New Bag   1259 VB-Anesthesia Volume Adjustment           bupivacaine 0.5%-EPINEPHrine 1:200,000 PF (Marcaine/Epinephrine PF) injection  Freq: As needed  Start: 04/14/25 1203 End: 04/14/25 1301      1203 BK-Given   1301-D/C'd        fentaNYL (Sublimaze) 50 mcg/mL injection 25 mcg  Dose: 25 mcg  Freq: Every 5 min PRN Route: IV  PRN Reason: moderate pain  Start: 04/14/25 1248 End: 04/14/25 1436   Admin Instructions:   Total maximum of 100 mcg.  Use  PRN reason as a guide and follow range order policy.  Use as first line medication.      1328 PT-Given   1333 PT-Given     1349 PT-Given   1436-D/C'd        HYDROmorphone (Dilaudid) 1 MG/ML injection 0.4 mg  Dose: 0.4 mg  Freq: Every 2 hour PRN Route: IV  PRN Reason: moderate pain  Start: 04/14/25 1440   Admin Instructions:   Use PRN reason as a guide and follow range order policy. If oral pain meds are ordered and patient can tolerate oral intake, start with PRN oral pain medications first.      1951 CP-Given       0215 CP-Given         iopamidol 76% (ISOVUE-370) injection for power injector  Dose: 100 mL  Freq: IMG once as needed Route: IV  PRN Reason: contrast  Start: 04/12/25 2340 End: 04/12/25 2340 2340 LM-Given              Vitals       Date/Time Temp Pulse Resp BP SpO2 Weight O2 Device O2 Flow Rate (L/min) [facility]    04/15/25 1345 -- 66 16 127/71 98 % -- None (Room air) -- MM    04/15/25 1340 -- 64 22 133/91 96 % -- -- -- MM    04/15/25 1335 -- 65 13 122/84 99 % -- None (Room air) -- MM    04/15/25 1330 -- 65 19 123/78 97 % -- -- -- MM    04/15/25 1233 97.8 °F (36.6 °C) 64 20 143/71 94 % -- None (Room air) -- SS    04/15/25 0836 97.8 °F (36.6 °C) 62 22 131/60 93 % -- None (Room air) -- SS    04/15/25 0453 98 °F (36.7 °C) 63 20 119/60 96 % -- Nasal cannula 2 L/min EK    04/15/25 0037 98.5 °F (36.9 °C) 64 20 114/59 96 % -- Nasal cannula 2 L/min EK    04/14/25 2022 97.8 °F (36.6 °C) 70 20 114/58 97 % -- Nasal cannula 2.5 L/min EK    04/14/25 1648 -- 74 22 -- 92 % -- Nasal cannula 2 L/min MW    04/14/25 1646 -- 75 22 -- 87 % -- None (Room air) -- MW    04/14/25 1644 98.2 °F (36.8 °C) 70 22 131/64 93 % -- None (Room air) -- MW    04/14/25 1425 -- 66 16 -- 98 % -- -- -- PT    04/14/25 1420 -- 61 15 -- 99 % -- -- -- PT    04/14/25 1415 -- 64 19 132/61 98 % -- -- -- PT    04/14/25 1410 -- 61 12 -- 97 % -- -- -- PT    04/14/25 1405 -- 59 13 -- 96 % -- -- -- PT    04/14/25 1400 -- 59 12 125/60 96 % -- -- -- PT     04/14/25 1355 -- 60 15 -- 95 % -- Nasal cannula 3 L/min PT    04/14/25 1350 -- 62 19 -- 96 % -- -- -- PT    04/14/25 1345 -- 62 17 -- 97 % -- -- -- PT    04/14/25 1340 -- 60 16 123/64 96 % -- -- -- PT    04/14/25 1335 97.4 °F (36.3 °C) 56 14 -- 97 % -- -- -- PT    04/14/25 1330 -- 60 21 105/65 97 % -- -- -- PT    04/14/25 1325 -- 62 19 -- 96 % -- -- -- PT    04/14/25 1320 -- 61 17 -- 97 % -- -- 3 L/min PT    04/14/25 1315 -- 60 19 112/62 95 % -- -- 4 L/min PT    04/14/25 1310 -- 63 13 114/68 95 % -- Nasal cannula 5 L/min PT    04/14/25 1305 -- 65 20 115/55 94 % -- -- -- PT    04/14/25 1300 -- 66 21 121/60 93 % -- -- -- PT    04/14/25 1257 97.7 °F (36.5 °C) 66 18 121/60 93 % -- Simple mask 9 L/min PT    04/14/25 1048 98.2 °F (36.8 °C) 58 17 132/70 96 % -- None (Room air) -- MW    04/14/25 0943 97.4 °F (36.3 °C) -- -- 140/73 -- -- -- -- IA    04/14/25 0923 -- -- -- -- 92 % -- -- -- IA    04/14/25 0920 98.1 °F (36.7 °C) -- -- -- 92 % -- -- -- IA    04/14/25 0747 97.5 °F (36.4 °C) 59 20 133/69 95 % -- None (Room air) -- MW    04/14/25 0420 97.9 °F (36.6 °C) 62 19 142/68 98 % -- -- -- AM     04/13/25 2348 98.1 °F (36.7 °C) 64 20 141/65 -- -- -- -- AM   04/13/25 2000 97.9 °F (36.6 °C) 67 -- 145/88 -- -- -- -- AM   04/13/25 1620 98.2 °F (36.8 °C) 67 17 129/68 99 % -- None (Room air) -- LM   04/13/25 1244 98.1 °F (36.7 °C) 65 14 134/73 98 % -- None (Room air) -- LM   04/13/25 1005 98.2 °F (36.8 °C) 65 22 142/76 94 % -- None (Room air) -- AF   04/13/25 0845 98.2 °F (36.8 °C) 67 17 136/71 100 % -- None (Room air) -- LM   04/13/25 0747 -- -- -- -- -- -- Nasal cannula 2 L/min AMA   04/13/25 0747 97.8 °F (36.6 °C) 65 19 135/63 100 % -- -- -- LM   04/13/25 0643 -- 71 23 146/64 100 % -- Nasal cannula 2 L/min LR   04/13/25 0558 -- 64 14 132/59 99 % -- Nasal cannula 2 L/min LR   04/13/25 0504 -- 63 16 -- 97 % -- Nasal cannula 2 L/min JM   04/13/25 0504 98.1 °F (36.7 °C) -- -- 123/56 -- -- -- -- LR   04/13/25 0300 98.4 °F (36.9 °C)  -- -- -- -- -- -- -- JM   04/13/25 0300 -- 64 17 144/68 100 % -- None (Room air) -- LR   04/13/25 0231 -- 73 21 138/64 100 % -- None (Room air) -- LR   04/13/25 0207 -- 66 18 128/55 98 % -- None (Room air) -- LR   04/13/25 0145 98.2 °F (36.8 °C) 65 25 145/69 100 % -- None (Room air) -- LR   04/13/25 0115 -- -- -- -- -- 233 lb 0.4 oz (105.7 kg) -- -- LR   04/13/25 0045 98 °F (36.7 °C) 74 18 141/51 100 % -- None (Room air) -- BG   04/13/25 0028 98.1 °F (36.7 °C) 72 19 145/71 100 % -- -- -- BG   04/13/25 0000 -- 73 17 152/68 100 % -- None (Room air) -- BG   04/12/25 2300 -- 74 18 119/61 100 % -- None (Room air) -- BG   04/12/25 2143 98.1 °F (36.7 °C) 80 20 126/69 98 % 233 lb (105.7 kg) None (Room air) -- EC     Blood Transfusion Record       Product Unit Status Volume Start End            Transfuse RBC       25  [id number]  [id number] Transfusing  04/14/25 0923        25  [id number]  [id number] Completed 04/13/25 1150 437.5 mL 04/13/25 0930 04/13/25 1150       25  [id number]  [id number] Completed 04/13/25 0932 335 mL 04/13/25 0539 04/13/25 0900       25  [id number]  [id number] Completed 04/13/25 0428 670 mL 04/13/25 0028 04/14/25 1300               FOR REVIEW/APPROVAL OF INPT ADMISSION

## 2025-04-15 NOTE — PLAN OF CARE
A/o x4. 2 LNC at night. CLD, tolerated well. NPO midnight for possible EGD. Gu[place] sites intact and dry. C/o abdominal pain when ambulating to bathroom. Prn pain meds given with relief. Afebrile overnight. No stool overnight.  Problem: HEMATOLOGIC - ADULT  Goal: Maintains hematologic stability  Description: INTERVENTIONS- Assess for signs and symptoms of bleeding or hemorrhage- Monitor labs and vital signs for trends- Administer supportive blood products/factors, fluids and medications as ordered and appropriate- Administer supportive blood products/factors as ordered and appropriate  Outcome: Progressing

## 2025-04-15 NOTE — PROGRESS NOTES
Back from Egd in stable condition, Duodenal ulcer w/ no active bleeding noted, Still npo for Mre, Mri of abdomen, Glucagon iv for premeds,Attended needs.

## 2025-04-15 NOTE — PROGRESS NOTES
[facility]   part of [facility]     Hospitalist Progress Note     Moises Olsen Patient Status:  Inpatient    7/15/1960 MRN [id number]   Location [facility] 4NW-A Attending Lili Morrissey MD   Hosp Day # 2 PCP Osiel Rader MD     Chief Complaint:dizziness    Subjective:     Patient denies cp sob abd pain dizziness    Objective:    Review of Systems:   A comprehensive review of systems was completed; pertinent positive and negatives stated in subjective.    Vital signs:  Temp:  [97.4 °F (36.3 °C)-98.5 °F (36.9 °C)] 98 °F (36.7 °C)  Pulse:  [56-75] 63  Resp:  [12-22] 20  BP: (105-140)/(55-73) 119/60  SpO2:  [87 %-99 %] 96 %    Physical Exam:    General: No acute distress  Respiratory: No wheezes, no rhonchi  Cardiovascular: S1, S2, regular rate and rhythm  Abdomen: Soft, Non-tender, non-distended, positive bowel sounds  Neuro: No new focal deficits.   Extremities: No edema      Diagnostic Data:    Labs:  Recent Labs   Lab 25  12125  0444 25  1216 25  0601 25  1708   WBC 5.9 5.0 5.0  --  6.5 10.0   HGB 4.6* 4.7* 5.4* 7.3* 7.9* 7.8*   MCV 63.7* 62.5* 65.2*  --  68.1* 72.8*   .0 238.0 236.0  --  234.0 235.0   INR  --  1.30*  --   --   --   --        Recent Labs   Lab 25  1210 25  2204 25  0601   GLU 85 105* 89   BUN 17 17 12   CREATSERUM 0.98 1.18 0.89   CA 9.8 9.5 9.7   ALB 4.6 4.6  --     143 139   K 3.8 3.8 4.0    107 103   CO2 27.0 26.0 27.0   ALKPHO 57 58  --    AST 14 19  --    ALT 8* 7*  --    BILT 0.5 0.5  --    TP 7.1 7.0  --        Estimated Glomerular Filtration Rate: 96 mL/min/1.73m2 (result from lab).    No results for input(s): \"TROP\", \"TROPHS\", \"CK\" in the last 168 hours.    Recent Labs   Lab 25  2204   PTP 16.4*   INR 1.30*                  Microbiology    No results found for this visit on 25.      Imaging: Reviewed in Epic.    Medications: Scheduled Medications[1]    Assessment & Plan:       #Symptomatic microcytic anemia with a hemoglobin of 4.7  Transfused 3 units hb 7.3   GI consult appreciated  SBC in am  Hematology consult with recurrent symptomatic anemia  PPI  #Acute appendicitis seen on CT abdomen     IV Zosyn  cld, on IV fluids, IV Zofran as needed nausea vomiting,   No abd pain  S/p lap appe  #Hypertension  Continue home metoprolol  Hold home losartan hydrochlorothiazide   #Prediabetes/mild diabetes mellitus type 2  Hyperglycemia protocol  Follow Accu-Cheks  On chart review last hemoglobin A1c done on 1/17/2025 was 5.7, before that had a hemoglobin A1c on 12/16/2019 which was 6.1 at that time.  #Hyperlipidemia  Continue home statin     Quality:  DVT Prophylaxis: SCD, no pharmacological prophylaxis due to severe anemia, possibility of occult GI bleed  CODE status:   Code Status: Not on file  Sutton: No urinary catheter in place      Lili Morrissey MD    Supplementary Documentation:     Quality:  DVT Mechanical Prophylaxis:   SCDs,    DVT Pharmacologic Prophylaxis   Medication    heparin (Porcine) 5000 UNIT/ML injection 5,000 Units                Code Status: Not on file  Sutton: No urinary catheter in place  Sutton Duration (in days):   Central line:    CA:     Discharge is dependent on: progress  At this point Mr. Olsen is expected to be discharge to: home    The 21st Century Cures Act makes medical notes like these available to patients in the interest of transparency. Please be advised this is a medical document. Medical documents are intended to carry relevant information, facts as evident, and the clinical opinion of the practitioner. The medical note is intended as peer to peer communication and may appear blunt or direct. It is written in medical language and may contain abbreviations or verbiage that are unfamiliar.                         [1]    sodium ferric gluconate  125 mg Intravenous Daily    pantoprazole  40 mg Intravenous Q24H    heparin  5,000 Units Subcutaneous Q8H BISI     acetaminophen  1,000 mg Oral Q8H BISI    metoprolol succinate ER  25 mg Oral Daily Beta Blocker    rosuvastatin  40 mg Oral Daily    folic acid  1 mg Oral Daily    piperacillin-tazobactam  3.375 g Intravenous Q8H

## 2025-04-15 NOTE — PROGRESS NOTES
[facility]   part of [facility]     Hospitalist Progress Note     Moises Olsen Patient Status:  Inpatient    7/15/1960 MRN [id number]   Location [facility] 4NW-A Attending Lili Morrissey MD   Hosp Day # 2 PCP Osiel Rader MD     Chief Complaint:dizziness    Subjective:     Patient denies cp sob abd pain dizziness melena hematemesis    Objective:    Review of Systems:   A comprehensive review of systems was completed; pertinent positive and negatives stated in subjective.    Vital signs:  Temp:  [97.4 °F (36.3 °C)-98.5 °F (36.9 °C)] 98 °F (36.7 °C)  Pulse:  [56-75] 63  Resp:  [12-22] 20  BP: (105-140)/(55-73) 119/60  SpO2:  [87 %-99 %] 96 %    Physical Exam:    General: No acute distress  Respiratory: No wheezes, no rhonchi  Cardiovascular: S1, S2, regular rate and rhythm  Abdomen: Soft, Non-tender, non-distended, positive bowel sounds  Neuro: No new focal deficits.   Extremities: No edema      Diagnostic Data:    Labs:  Recent Labs   Lab 25  1210 25  0444 25  1216 25  0601 25  1708   WBC 5.9 5.0 5.0  --  6.5 10.0   HGB 4.6* 4.7* 5.4* 7.3* 7.9* 7.8*   MCV 63.7* 62.5* 65.2*  --  68.1* 72.8*   .0 238.0 236.0  --  234.0 235.0   INR  --  1.30*  --   --   --   --        Recent Labs   Lab 25  1210 25  2204 25  0601   GLU 85 105* 89   BUN 17 17 12   CREATSERUM 0.98 1.18 0.89   CA 9.8 9.5 9.7   ALB 4.6 4.6  --     143 139   K 3.8 3.8 4.0    107 103   CO2 27.0 26.0 27.0   ALKPHO 57 58  --    AST 14 19  --    ALT 8* 7*  --    BILT 0.5 0.5  --    TP 7.1 7.0  --        Estimated Glomerular Filtration Rate: 96 mL/min/1.73m2 (result from lab).    No results for input(s): \"TROP\", \"TROPHS\", \"CK\" in the last 168 hours.    Recent Labs   Lab 25  2204   PTP 16.4*   INR 1.30*                  Microbiology    No results found for this visit on 25.      Imaging: Reviewed in Epic.    Medications: Scheduled  Medications[1]    Assessment & Plan:      #Symptomatic microcytic anemia with a hemoglobin of 4.7  Transfused 3 units   Egd today and mre in am  #Acute appendicitis seen on CT abdomen     IV Zosyn  cld, on IV fluids, IV Zofran as needed nausea vomiting,   No abd pain  S/p lap appe  #Hypertension  Continue home metoprolol  Hold home losartan hydrochlorothiazide   #Prediabetes/mild diabetes mellitus type 2  Hyperglycemia protocol  Follow Accu-Cheks  On chart review last hemoglobin A1c done on 1/17/2025 was 5.7, before that had a hemoglobin A1c on 12/16/2019 which was 6.1 at that time.  #Hyperlipidemia  Continue home statin     Quality:  DVT Prophylaxis: SCD, no pharmacological prophylaxis due to severe anemia, possibility of occult GI bleed  CODE status:   Code Status: Not on file  Sutton: No urinary catheter in place      Lili Morrissey MD    Supplementary Documentation:     Quality:  DVT Mechanical Prophylaxis:   SCDs,    DVT Pharmacologic Prophylaxis   Medication    heparin (Porcine) 5000 UNIT/ML injection 5,000 Units                Code Status: Not on file  Sutton: No urinary catheter in place  Sutton Duration (in days):   Central line:    CA:     Discharge is dependent on: progress  At this point Mr. Olsen is expected to be discharge to: home    The 21st Century Cures Act makes medical notes like these available to patients in the interest of transparency. Please be advised this is a medical document. Medical documents are intended to carry relevant information, facts as evident, and the clinical opinion of the practitioner. The medical note is intended as peer to peer communication and may appear blunt or direct. It is written in medical language and may contain abbreviations or verbiage that are unfamiliar.                         [1]    sodium ferric gluconate  125 mg Intravenous Daily    pantoprazole  40 mg Intravenous Q24H    heparin  5,000 Units Subcutaneous Q8H BISI    acetaminophen  1,000 mg Oral Q8H BISI     metoprolol succinate ER  25 mg Oral Daily Beta Blocker    rosuvastatin  40 mg Oral Daily    folic acid  1 mg Oral Daily    piperacillin-tazobactam  3.375 g Intravenous Q8H

## 2025-04-16 LAB
ERYTHROCYTE [DISTWIDTH] IN BLOOD BY AUTOMATED COUNT: 29.8 %
HCT VFR BLD AUTO: 27.3 % (ref 39–53)
HGB BLD-MCNC: 7.7 G/DL (ref 13–17.5)
HGB RETIC QN AUTO: 21.1 PG (ref 28.2–36.6)
IMM RETICS NFR: 0.42 RATIO (ref 0.1–0.3)
MCH RBC QN AUTO: 20.2 PG (ref 26–34)
MCHC RBC AUTO-ENTMCNC: 28.2 G/DL (ref 31–37)
MCV RBC AUTO: 71.7 FL (ref 80–100)
PLATELET # BLD AUTO: 229 10(3)UL (ref 150–450)
PLATELETS.RETICULATED NFR BLD AUTO: 5.8 % (ref 0–7)
RBC # BLD AUTO: 3.81 X10(6)UL (ref 4.3–5.7)
RETICS # AUTO: 153.2 X10(3) UL (ref 22.5–147.5)
RETICS/RBC NFR AUTO: 4 % (ref 0.5–2.5)
WBC # BLD AUTO: 6.6 X10(3) UL (ref 4–11)

## 2025-04-16 PROCEDURE — 99232 SBSQ HOSP IP/OBS MODERATE 35: CPT | Performed by: HOSPITALIST

## 2025-04-16 RX ORDER — OXYCODONE HYDROCHLORIDE 5 MG/1
5 TABLET ORAL EVERY 6 HOURS PRN
Qty: 5 TABLET | Refills: 0 | Status: SHIPPED | OUTPATIENT
Start: 2025-04-16

## 2025-04-16 NOTE — PROGRESS NOTES
Mri  to be done tomorrow, Npo after midnight, Also for capsule endoscopy tomorrow, Consent was signed.   ````````````````````````

## 2025-04-16 NOTE — PLAN OF CARE
Pt alert/oriented x 4.   Hgb 7.7 today. S/p lap appe on 4/14, lap sites x 3 with steri-strips and bandaids.  Dressing dry and intact.  Pt complaints of abdominal tenderness at lap sites.  Abdomen rounded, hypoactive bowel sounds.  Capsule study placed at 1000 by endo RN.  Capsule study protocol followed.  Encouraged pt to ambulate in halls.  MRI enterography still to be done.  Writer spoke to Jinny in MRI, unable to do MRE today d/t capsule study. \"Capsule needs to fully pass before MRE can be done.\"  Pt had small soft brown bowel movement this evening.  No capsule noted in stool.  Updated pt re: plan of care. Instructed to call for needs.  Verbalizes understanding.  Meds per MAR.  Safety measures in place.     Problem: HEMATOLOGIC - ADULT  Goal: Maintains hematologic stability  Description: INTERVENTIONS- Assess for signs and symptoms of bleeding or hemorrhage- Monitor labs and vital signs for trends- Administer supportive blood products/factors, fluids and medications as ordered and appropriate- Administer supportive blood products/factors as ordered and appropriate  Outcome: Progressing

## 2025-04-16 NOTE — PLAN OF CARE
Received pt a/o x4. VSS. Afebrile. Reporting mild abd pain, tylenol given. Medication admin per MAR. Tolerating clears, no nausea. Npo at midnight for planned capsule study & MRE. Ambulating within room. Call light within reach. Safety precautions in place.     Problem: HEMATOLOGIC - ADULT  Goal: Maintains hematologic stability  Description: INTERVENTIONS- Assess for signs and symptoms of bleeding or hemorrhage- Monitor labs and vital signs for trends- Administer supportive blood products/factors, fluids and medications as ordered and appropriate- Administer supportive blood products/factors as ordered and appropriate  Outcome: Progressing

## 2025-04-16 NOTE — PROGRESS NOTES
[facility]   part of [facility]     Hospitalist Progress Note     Moises Olsen Patient Status:  Inpatient    7/15/1960 MRN [id number]   Location [facility] 4NW-A Attending Lili Morrissey MD   Hosp Day # 3 PCP Osiel Rader MD     Chief Complaint:dizziness    Subjective:     No new complaints    Objective:    Review of Systems:   A comprehensive review of systems was completed; pertinent positive and negatives stated in subjective.    Vital signs:  Temp:  [97.8 °F (36.6 °C)-98.6 °F (37 °C)] 98.5 °F (36.9 °C)  Pulse:  [56-66] 57  Resp:  [13-22] 16  BP: (122-148)/(60-91) 142/61  SpO2:  [93 %-100 %] 100 %    Physical Exam:    General: No acute distress  Respiratory: No wheezes, no rhonchi  Cardiovascular: S1, S2, regular rate and rhythm  Abdomen: Soft, Non-tender, non-distended, positive bowel sounds  Neuro: No new focal deficits.   Extremities: No edema      Diagnostic Data:    Labs:  Recent Labs   Lab 254 25  0444 25  1216 25  0601 25  1708 04/15/25  0619   WBC 5.0 5.0  --  6.5 10.0 8.3   HGB 4.7* 5.4* 7.3* 7.9* 7.8* 7.4*   MCV 62.5* 65.2*  --  68.1* 72.8* 71.0*   .0 236.0  --  234.0 235.0 212.0   INR 1.30*  --   --   --   --   --        Recent Labs   Lab 25  1210 25  2204 25  0601   GLU 85 105* 89   BUN 17 17 12   CREATSERUM 0.98 1.18 0.89   CA 9.8 9.5 9.7   ALB 4.6 4.6  --     143 139   K 3.8 3.8 4.0    107 103   CO2 27.0 26.0 27.0   ALKPHO 57 58  --    AST 14 19  --    ALT 8* 7*  --    BILT 0.5 0.5  --    TP 7.1 7.0  --        Estimated Glomerular Filtration Rate: 96 mL/min/1.73m2 (result from lab).    No results for input(s): \"TROP\", \"TROPHS\", \"CK\" in the last 168 hours.    Recent Labs   Lab 25  2204   PTP 16.4*   INR 1.30*            Microbiology    No results found for this visit on 25.      Imaging: Reviewed in Epic.    Medications: Scheduled Medications[1]    Assessment & Plan:      #Symptomatic microcytic anemia  with a hemoglobin of 4.7  -Transfused 3 units   -Egd 4/15 showing clean bases ulceration of the duodenal bulb without active bleeding  -PPI BID  -capsule endoscopy     #Acute appendicitis seen on CT abdomen  - IV Zosyn  -cld, on IV fluids, IV Zofran as needed nausea vomiting,   -No abd pain  -S/p lap appy    #Hypertension  -metoprolol  -losartan hydrochlorothiazide     #Prediabetes/mild diabetes mellitus type 2  -Hyperglycemia protocol  -Follow Accu-Cheks  -On chart review last hemoglobin A1c done on 1/17/2025 was 5.7, before that had a hemoglobin A1c on 12/16/2019 which was 6.1 at that time.    #Hyperlipidemia  -Continue home statin     Quality:  DVT Prophylaxis: SCD, no pharmacological prophylaxis due to severe anemia, possibility of occult GI bleed  CODE status:   Code Status: Not on file  Sutton: No urinary catheter in place    Supplementary Documentation:     Quality:  DVT Mechanical Prophylaxis:   SCDs,    DVT Pharmacologic Prophylaxis   Medication    heparin (Porcine) 5000 UNIT/ML injection 5,000 Units                Code Status: Not on file  Sutton: No urinary catheter in place  Sutton Duration (in days):   Central line:    CA:     Discharge is dependent on: progress  At this point Mr. Olsen is expected to be discharge to: home    The 21st Century Cures Act makes medical notes like these available to patients in the interest of transparency. Please be advised this is a medical document. Medical documents are intended to carry relevant information, facts as evident, and the clinical opinion of the practitioner. The medical note is intended as peer to peer communication and may appear blunt or direct. It is written in medical language and may contain abbreviations or verbiage that are unfamiliar.                         [1]    sodium ferric gluconate  125 mg Intravenous Daily    pantoprazole  40 mg Intravenous Q24H    heparin  5,000 Units Subcutaneous Q8H BISI    acetaminophen  1,000 mg Oral Q8H BISI     metoprolol succinate ER  25 mg Oral Daily Beta Blocker    rosuvastatin  40 mg Oral Daily    folic acid  1 mg Oral Daily    piperacillin-tazobactam  3.375 g Intravenous Q8H

## 2025-04-16 NOTE — PROGRESS NOTES
[facility]  Progress Note    Moises Olsen Patient Status:  Inpatient    7/15/1960 MRN [id number]   [facility] 4NW-A Attending Yulia Watts MD   Hosp Day # 3 PCP Osiel Rader MD     *Late Entry*     Subjective:  Patient seen and examined at bedside this morning. He states he continues to feel well today. He reports minor incisional tenderness, well managed with analgesics. He has been tolerating a diet, no nausea or vomiting. Patient continues to report bowel movements. No fever or chills.    Objective/Physical Exam:  General: Alert, orientated x3.  Cooperative.  No apparent distress.  Vital Signs:  Blood pressure 136/71, pulse 59, temperature 98.4 °F (36.9 °C), temperature source Oral, resp. rate 18, height 67\", weight 233 lb 0.4 oz (105.7 kg), SpO2 95%.  Wt Readings from Last 3 Encounters:   25 233 lb 0.4 oz (105.7 kg)   25 233 lb (105.7 kg)   25 233 lb 6.4 oz (105.9 kg)     Lungs: No respiratory distress.  Cardiac: Regular rate and rhythm.   Abdomen:  Soft, non distended, appropriate incisional tenderness, with no rebound or guarding.  No peritoneal signs.   Extremities:  No lower extremity edema noted.    Incision: Laparoscopic incisions are clean, dry, intact, no erythema. Minor ecchymosis surrounding incision sites    Intake/Output:    Intake/Output Summary (Last 24 hours) at 2025 0919  Last data filed at 2025 0805  Gross per 24 hour   Intake 1949.1 ml   Output 250 ml   Net 1699.1 ml     I/O last 3 completed shifts:  In: 1949.1 [P.O.:480; I.V.:1369.1; IV PIGGYBACK:100]  Out: 0   I/O this shift:  In: 0   Out: 250 [Urine:250]    Medications: Scheduled Medications[1]    Labs:  Lab Results   Component Value Date    WBC 6.6 2025    HGB 7.7 2025    HCT 27.3 2025    .0 2025        Lab Results   Component Value Date    INR 1.30 (H) 2025    INR 1.25 (H) 2025    INR 1.25 (H) 12/15/2024         Assessment  Problem  List[2]    POD 2 laparoscopic appendectomy   Acute on chronic blood loss anemia    Plan:  Low-fiber diet  Capsule endoscopy today with GI  Medical management per primary team  Continue analgesics and antiemetics as needed  Continue IV antibiotics while admitted - patient will DC on PO antibiotics for 5 days of total therapy  Ambulate and up to chair  DVT prophylaxis with Heparin  GI prophylaxis with Protonix  Patient is stable from general surgery standpoint for discharge to home, following completion of ongoing workup and clearance from other specialties. General surgery will sign off at this time.   Please notify general surgery on day of discharge so we may calculate and send correct antibiotic course. Patient will follow up with [facility] Gen Surg PA in 2 weeks.    Quality:  DVT Mechanical Prophylaxis:   SCDs,    DVT Pharmacologic Prophylaxis   Medication    heparin (Porcine) 5000 UNIT/ML injection 5,000 Units                Code Status: Not on file  Sutton: No urinary catheter in place  Sutton Duration (in days):   Central line:    CA:         Jinny Fraga PA-C  4/16/2025  9:19 AM             [1]    sodium ferric gluconate  125 mg Intravenous Daily    pantoprazole  40 mg Intravenous Q24H    heparin  5,000 Units Subcutaneous Q8H BISI    acetaminophen  1,000 mg Oral Q8H BISI    metoprolol succinate ER  25 mg Oral Daily Beta Blocker    rosuvastatin  40 mg Oral Daily    folic acid  1 mg Oral Daily    piperacillin-tazobactam  3.375 g Intravenous Q8H   [2]   Patient Active Problem List  Diagnosis    Gastrointestinal hemorrhage    Gastrointestinal hemorrhage, unspecified gastrointestinal hemorrhage type    Iron deficiency anemia due to chronic blood loss    Renal insufficiency    Accelerated hypertension    High blood cholesterol    History of GI bleed    Upper GI bleed    Hypertension    Undiagnosed cardiac murmurs    Melena    AS (atherosclerosis)    Atherosclerosis of abdominal aorta    Dyslipidemia    Dyspnea on exertion     Elevated coronary artery calcium score    Snoring    Vitamin D deficiency    Symptomatic anemia    Microcytic anemia    Guaiac positive stools    Acute appendicitis, unspecified acute appendicitis type    Iron deficiency anemia secondary to blood loss (chronic)    Iron refractory iron deficiency anemia

## 2025-04-17 ENCOUNTER — APPOINTMENT (OUTPATIENT)
Dept: GENERAL RADIOLOGY | Facility: HOSPITAL | Age: 65
End: 2025-04-17
Attending: NURSE PRACTITIONER
Payer: COMMERCIAL

## 2025-04-17 LAB
BASOPHILS # BLD AUTO: 0.08 X10(3) UL (ref 0–0.2)
BASOPHILS NFR BLD AUTO: 1.3 %
EOSINOPHIL # BLD AUTO: 0.31 X10(3) UL (ref 0–0.7)
EOSINOPHIL NFR BLD AUTO: 5.1 %
ERYTHROCYTE [DISTWIDTH] IN BLOOD BY AUTOMATED COUNT: 30.9 %
HCT VFR BLD AUTO: 29.8 % (ref 39–53)
HGB BLD-MCNC: 8.3 G/DL (ref 13–17.5)
IMM GRANULOCYTES # BLD AUTO: 0.05 X10(3) UL (ref 0–1)
IMM GRANULOCYTES NFR BLD: 0.8 %
LYMPHOCYTES # BLD AUTO: 1.13 X10(3) UL (ref 1–4)
LYMPHOCYTES NFR BLD AUTO: 18.8 %
MCH RBC QN AUTO: 20.4 PG (ref 26–34)
MCHC RBC AUTO-ENTMCNC: 27.9 G/DL (ref 31–37)
MCV RBC AUTO: 73.2 FL (ref 80–100)
MONOCYTES # BLD AUTO: 0.58 X10(3) UL (ref 0.1–1)
MONOCYTES NFR BLD AUTO: 9.6 %
NEUTROPHILS # BLD AUTO: 3.87 X10 (3) UL (ref 1.5–7.7)
NEUTROPHILS # BLD AUTO: 3.87 X10(3) UL (ref 1.5–7.7)
NEUTROPHILS NFR BLD AUTO: 64.4 %
PLATELET # BLD AUTO: 241 10(3)UL (ref 150–450)
PLATELETS.RETICULATED NFR BLD AUTO: 4.1 % (ref 0–7)
RBC # BLD AUTO: 4.07 X10(6)UL (ref 4.3–5.7)
WBC # BLD AUTO: 6 X10(3) UL (ref 4–11)

## 2025-04-17 PROCEDURE — 99232 SBSQ HOSP IP/OBS MODERATE 35: CPT | Performed by: HOSPITALIST

## 2025-04-17 PROCEDURE — 74021 RADEX ABDOMEN 3+ VIEWS: CPT | Performed by: NURSE PRACTITIONER

## 2025-04-17 NOTE — PLAN OF CARE
Received pt a/o x4. VSS. Afebrile. Reporting mild abd tenderness at lap sites. Medication admin per MAR. Capsule study completed. MRE pending passage of capsule. NPO since midnight. Call light within reach. Safety precautions in place.     Problem: HEMATOLOGIC - ADULT  Goal: Maintains hematologic stability  Description: INTERVENTIONS- Assess for signs and symptoms of bleeding or hemorrhage- Monitor labs and vital signs for trends- Administer supportive blood products/factors, fluids and medications as ordered and appropriate- Administer supportive blood products/factors as ordered and appropriate  Outcome: Progressing

## 2025-04-17 NOTE — PROGRESS NOTES
[facility]   part of [facility]     Hospitalist Progress Note     Moises Olsen Patient Status:  Inpatient    7/15/1960 MRN [id number]   Location [facility] 4NW-A Attending Lili Morrissey MD   Hosp Day # 4 PCP Osiel Rader MD     Chief Complaint:dizziness    Subjective:     No new complaints  Wants to go home, no abdominal pain    Objective:    Review of Systems:   A comprehensive review of systems was completed; pertinent positive and negatives stated in subjective.    Vital signs:  Temp:  [97.4 °F (36.3 °C)-98.2 °F (36.8 °C)] 98.1 °F (36.7 °C)  Pulse:  [57-65] 61  Resp:  [16-18] 18  BP: (150-160)/(63-73) 160/73  SpO2:  [89 %-98 %] 95 %    Physical Exam:    General: No acute distress  Respiratory: No wheezes, no rhonchi  Cardiovascular: S1, S2, regular rate and rhythm  Abdomen: Soft, Non-tender, non-distended, positive bowel sounds  Neuro: No new focal deficits.   Extremities: No edema      Diagnostic Data:    Labs:  Recent Labs   Lab 25  2204 25  0444 25  0601 25  1708 04/15/25  0619 25  0701 25  0709   WBC 5.0   < > 6.5 10.0 8.3 6.6 6.0   HGB 4.7*   < > 7.9* 7.8* 7.4* 7.7* 8.3*   MCV 62.5*   < > 68.1* 72.8* 71.0* 71.7* 73.2*   .0   < > 234.0 235.0 212.0 229.0 241.0   INR 1.30*  --   --   --   --   --   --     < > = values in this interval not displayed.       Recent Labs   Lab 25  1210 25  2204 25  0601   GLU 85 105* 89   BUN 17 17 12   CREATSERUM 0.98 1.18 0.89   CA 9.8 9.5 9.7   ALB 4.6 4.6  --     143 139   K 3.8 3.8 4.0    107 103   CO2 27.0 26.0 27.0   ALKPHO 57 58  --    AST 14 19  --    ALT 8* 7*  --    BILT 0.5 0.5  --    TP 7.1 7.0  --        Estimated Glomerular Filtration Rate: 96 mL/min/1.73m2 (result from lab).    No results for input(s): \"TROP\", \"TROPHS\", \"CK\" in the last 168 hours.    Recent Labs   Lab 25  2204   PTP 16.4*   INR 1.30*            Microbiology    No results found for this visit on  04/12/25.      Imaging: Reviewed in Epic.    Medications: Scheduled Medications[1]    Assessment & Plan:      #Symptomatic microcytic anemia with a hemoglobin of 4.7  -Transfused 3 units   -Egd 4/15 showing clean bases ulceration of the duodenal bulb without active bleeding  -PPI BID  -capsule endoscopy completed,  KUB to check if capsule has passed  -MRE pending    #Acute appendicitis with localized peritonitis  - D5 IV Zosyn- should complete 10 days of Abx  -cld, on IV fluids, IV Zofran as needed nausea vomiting,   -No abd pain  -S/p lap appy    #Hypertension  -metoprolol  -losartan hydrochlorothiazide     #Prediabetes/mild diabetes mellitus type 2  -Hyperglycemia protocol  -Follow Accu-Cheks  -On chart review last hemoglobin A1c done on 1/17/2025 was 5.7, before that had a hemoglobin A1c on 12/16/2019 which was 6.1 at that time.    #Hyperlipidemia  -Continue home statin     Quality:  DVT Prophylaxis: SCD, no pharmacological prophylaxis due to severe anemia, possibility of occult GI bleed  CODE status:   Code Status: Not on file  Sutton: No urinary catheter in place    Supplementary Documentation:     Quality:  DVT Mechanical Prophylaxis:   SCDs,    DVT Pharmacologic Prophylaxis   Medication    heparin (Porcine) 5000 UNIT/ML injection 5,000 Units                Code Status: Not on file  Sutton: No urinary catheter in place  Sutton Duration (in days):   Central line:    CA:     Discharge is dependent on: progress  At this point Mr. Olsen is expected to be discharge to: home    The 21st Century Cures Act makes medical notes like these available to patients in the interest of transparency. Please be advised this is a medical document. Medical documents are intended to carry relevant information, facts as evident, and the clinical opinion of the practitioner. The medical note is intended as peer to peer communication and may appear blunt or direct. It is written in medical language and may contain abbreviations or  verbiage that are unfamiliar.                         [1]    sodium ferric gluconate  125 mg Intravenous Daily    pantoprazole  40 mg Intravenous Q24H    heparin  5,000 Units Subcutaneous Q8H BISI    acetaminophen  1,000 mg Oral Q8H BISI    metoprolol succinate ER  25 mg Oral Daily Beta Blocker    rosuvastatin  40 mg Oral Daily    folic acid  1 mg Oral Daily    piperacillin-tazobactam  3.375 g Intravenous Q8H

## 2025-04-17 NOTE — PLAN OF CARE
Pt a/o x4. VSS, remained afebrile. Meds given per MAR. Obstructive series performed to check if capsule was expelled. MRI notified that capsule no longer in patient and is able to get the MRI done. Denied any pain. NPO at midnight to get MRI done tomorrow morning. Call light within reach.     Problem: HEMATOLOGIC - ADULT  Goal: Maintains hematologic stability  Description: INTERVENTIONS- Assess for signs and symptoms of bleeding or hemorrhage- Monitor labs and vital signs for trends- Administer supportive blood products/factors, fluids and medications as ordered and appropriate- Administer supportive blood products/factors as ordered and appropriate  Outcome: Progressing

## 2025-04-17 NOTE — PROGRESS NOTES
[facility] Progress Note      Patient Name: Moises Olsen   YOB: 1960  Medical Record Number: [id number]  Attending Physician: Lj Hester M.D.     The 21st Century Cures Act makes medical notes like these available to patients in the interest of transparency. Please be advised this is a medical document. Medical documents are intended to carry relevant information, facts as evident, and the clinical opinion of the practitioner. The medical note is intended as peer to peer communication and may appear blunt or direct. It is written in medical language and may contain abbreviations or verbiage that are unfamiliar.      Patient continues to receive IV iron. Reticulocyte count is elevated. Hemoglobin has improved. Patient has follow up with me at which time any further IV iron can be given. Hematology will continue to follow peripherally.     Electronically signed by:    Lj Hester M.D.

## 2025-04-18 PROCEDURE — 99232 SBSQ HOSP IP/OBS MODERATE 35: CPT | Performed by: HOSPITALIST

## 2025-04-18 RX ORDER — HYDRALAZINE HYDROCHLORIDE 20 MG/ML
10 INJECTION INTRAMUSCULAR; INTRAVENOUS ONCE
Status: COMPLETED | OUTPATIENT
Start: 2025-04-18 | End: 2025-04-18

## 2025-04-18 RX ORDER — LOSARTAN POTASSIUM 50 MG/1
50 TABLET ORAL DAILY
Status: DISCONTINUED | OUTPATIENT
Start: 2025-04-19 | End: 2025-04-19

## 2025-04-18 RX ORDER — ECHINACEA PURPUREA EXTRACT 125 MG
1 TABLET ORAL
Status: DISCONTINUED | OUTPATIENT
Start: 2025-04-18 | End: 2025-04-19

## 2025-04-18 RX ORDER — AMLODIPINE BESYLATE 5 MG/1
5 TABLET ORAL DAILY
Status: DISCONTINUED | OUTPATIENT
Start: 2025-04-18 | End: 2025-04-19

## 2025-04-18 RX ORDER — LOSARTAN POTASSIUM 25 MG/1
25 TABLET ORAL DAILY
Status: DISCONTINUED | OUTPATIENT
Start: 2025-04-18 | End: 2025-04-18

## 2025-04-18 RX ORDER — LOSARTAN POTASSIUM 25 MG/1
25 TABLET ORAL ONCE
Status: COMPLETED | OUTPATIENT
Start: 2025-04-18 | End: 2025-04-18

## 2025-04-18 NOTE — DIETARY NOTE
[facility]   part of [facility]   CLINICAL NUTRITION    Moises Olsen     Admitting diagnosis:  Microcytic anemia [D50.9]  Guaiac positive stools [R19.5]  Symptomatic anemia [D64.9]  Acute appendicitis, unspecified acute appendicitis type [K35.80]    Ht: 170.2 cm (5' 7.01\")  Wt: 105.7 kg (233 lb 0.4 oz).   Body mass index is 36.48 kg/m².  IBW: 67.3kg    Wt Readings from Last 6 Encounters:   04/13/25 105.7 kg (233 lb 0.4 oz)   04/14/25 105.7 kg (233 lb)   04/12/25 105.9 kg (233 lb 6.4 oz)   02/10/25 102.5 kg (226 lb)   02/07/25 103.3 kg (227 lb 12.8 oz)   01/20/25 101 kg (222 lb 10.6 oz)        Labs/Meds reviewed    Diet:       Procedures    Low Fiber/Soft diet Low Fiber/Soft; Is Patient on Accuchecks? No     Percent Meals Eaten (last 3 days)       Date/Time Percent Meals Eaten (%)    04/16/25 2005 100 %    04/17/25 0929 0 %     Percent Meals Eaten (%): NPO at 04/17/25 0929    04/17/25 1155 0 %     Percent Meals Eaten (%): NPO at 04/17/25 1155    04/18/25 0933 0 %     Percent Meals Eaten (%): NPO at 04/18/25 0933              Pt chart reviewed d/t LOS. Pt currently NPO for procedure but otherwise reports good appetite/intake. Denies any n/v/d.  Pt well nourished   Patient reports good appetite at this time.  Nursing notes reports Percent Meals Eaten (%): 0 % (NPO) intake for last meal.  Tolerating po diet without diarrhea, emesis, or constipation.   No significant weight changes noted.     Patient is at low nutrition risk at this time.    Please consult if patient status changes or nutrition issues arise.    Mansi PuriRD,LDN  Clinical Dietitian  [phone]

## 2025-04-18 NOTE — PLAN OF CARE
Received pt a/o x4. VSS. Afebrile. Denies pain- mild tenderness at lap sites. Medication admin per MAR. NPO at midnight for planned MRI enterography. Call light within reach. Safety precautions in place.     Problem: HEMATOLOGIC - ADULT  Goal: Maintains hematologic stability  Description: INTERVENTIONS- Assess for signs and symptoms of bleeding or hemorrhage- Monitor labs and vital signs for trends- Administer supportive blood products/factors, fluids and medications as ordered and appropriate- Administer supportive blood products/factors as ordered and appropriate  4/18/2025 0343 by Ying Peguero, RN  Outcome: Progressing

## 2025-04-18 NOTE — PROGRESS NOTES
[facility] Gastroenterology Progress Note    CC: Anemia  S: No bleeding sx at this time   O: BP (!) 163/74   Pulse 64   Temp 98.2 °F (36.8 °C) (Oral)   Resp 18   Ht 5' 7.01\" (1.702 m)   Wt 233 lb 0.4 oz (105.7 kg)   SpO2 92%   BMI 36.48 kg/m²     Gen: NAD  Abd: (+)BS, soft, non-tender, non-distended; no rebound or guarding    Laboratory/Imaging Data:       Recent Labs   Lab 04/14/25  1258   PGLU 130*     Recent Labs   Lab 04/12/25  2204   INR 1.30*         Recent Labs   Lab 04/14/25  0601 04/14/25  1708 04/15/25  0619 04/16/25  0701 04/17/25  0709   WBC 6.5 10.0 8.3 6.6 6.0   HGB 7.9* 7.8* 7.4* 7.7* 8.3*   .0 235.0 212.0 229.0 241.0       Recent Labs   Lab 04/12/25  1210 04/12/25  2204 04/14/25  0601    143 139   K 3.8 3.8 4.0    107 103   CO2 27.0 26.0 27.0   BUN 17 17 12   CREATSERUM 0.98 1.18 0.89       Recent Labs   Lab 04/12/25  1210 04/12/25  2204   ALT 8* 7*   AST 14 19       Assessment/Plan:  65 yo male with HTN, dyslipidemia, DM and who also has a history of prior duodenal ulcer disease presenting with recurrent anemia. Pt with appendicitis and to go to OR today. Pt was noted to have GILMA with no overt bleeding at this time. Plan was for OP capsule, given recent EGD/Colon with DU 1/2025 but with persistent anemia. Hgb 8.3 at this time.  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

## 2025-04-18 NOTE — PLAN OF CARE
Pt a/o x4. Elevated BP, see flowsheet. Notified MD, see orders. C/o dizziness in the AM, has resolved since then. Denied any pain. Meds given per MAR. MRI enterography still pending. Call light within reach.     Problem: HEMATOLOGIC - ADULT  Goal: Maintains hematologic stability  Description: INTERVENTIONS- Assess for signs and symptoms of bleeding or hemorrhage- Monitor labs and vital signs for trends- Administer supportive blood products/factors, fluids and medications as ordered and appropriate- Administer supportive blood products/factors as ordered and appropriate  Outcome: Progressing

## 2025-04-18 NOTE — PROGRESS NOTES
[facility]   part of [facility]     Hospitalist Progress Note     Moises Olsen Patient Status:  Inpatient    7/15/1960 MRN [id number]   Location [facility] 4NW-A Attending Lili Morrissey MD   Hosp Day # 5 PCP Osiel Rader MD     Chief Complaint:dizziness    Subjective:     Hypertensive, had a headache this morning, given IV hydralazine  Waiting for MRE to be completed    Objective:    Review of Systems:   A comprehensive review of systems was completed; pertinent positive and negatives stated in subjective.    Vital signs:  Temp:  [97.4 °F (36.3 °C)-98.4 °F (36.9 °C)] 98.2 °F (36.8 °C)  Pulse:  [58-68] 64  Resp:  [16-18] 18  BP: (153-173)/(52-80) 163/74  SpO2:  [92 %-97 %] 92 %    Physical Exam:    General: No acute distress  Respiratory: No wheezes, no rhonchi  Cardiovascular: S1, S2, regular rate and rhythm  Abdomen: Soft, Non-tender, non-distended, positive bowel sounds  Neuro: No new focal deficits.   Extremities: No edema      Diagnostic Data:    Labs:  Recent Labs   Lab 25  0444 25  0601 25  1708 04/15/25  0619 25  0701 25  0709   WBC 5.0   < > 6.5 10.0 8.3 6.6 6.0   HGB 4.7*   < > 7.9* 7.8* 7.4* 7.7* 8.3*   MCV 62.5*   < > 68.1* 72.8* 71.0* 71.7* 73.2*   .0   < > 234.0 235.0 212.0 229.0 241.0   INR 1.30*  --   --   --   --   --   --     < > = values in this interval not displayed.       Recent Labs   Lab 25  1210 25  2204 25  0601   GLU 85 105* 89   BUN 17 17 12   CREATSERUM 0.98 1.18 0.89   CA 9.8 9.5 9.7   ALB 4.6 4.6  --     143 139   K 3.8 3.8 4.0    107 103   CO2 27.0 26.0 27.0   ALKPHO 57 58  --    AST 14 19  --    ALT 8* 7*  --    BILT 0.5 0.5  --    TP 7.1 7.0  --        Estimated Glomerular Filtration Rate: 96 mL/min/1.73m2 (result from lab).    No results for input(s): \"TROP\", \"TROPHS\", \"CK\" in the last 168 hours.    Recent Labs   Lab 25  2204   PTP 16.4*   INR 1.30*            Microbiology    No  results found for this visit on 04/12/25.      Imaging: Reviewed in Epic.    Medications: Scheduled Medications[1]    Assessment & Plan:      #Symptomatic microcytic anemia with a hemoglobin of 4.7  -Transfused 3 units   -Egd 4/15 showing clean bases ulceration of the duodenal bulb without active bleeding  -PPI BID  -capsule endoscopy completed  -MRE pending    #Acute appendicitis with localized peritonitis  - D5 IV Zosyn- should complete 10 days of Abx  -cld, on IV fluids, IV Zofran as needed nausea vomiting,   -No abd pain  -S/p lap appy    #Hypertension  -not on BB at home- will dc  -losartan hydrochlorothiazide - resume    #Prediabetes/mild diabetes mellitus type 2  -Hyperglycemia protocol  -Follow Accu-Cheks  -On chart review last hemoglobin A1c done on 1/17/2025 was 5.7, before that had a hemoglobin A1c on 12/16/2019 which was 6.1 at that time.    #Hyperlipidemia  -Continue home statin     Quality:  DVT Prophylaxis: SCD, no pharmacological prophylaxis due to severe anemia, possibility of occult GI bleed  CODE status:   Code Status: Not on file  Sutton: No urinary catheter in place    Supplementary Documentation:     Quality:  DVT Mechanical Prophylaxis:   SCDs,    DVT Pharmacologic Prophylaxis   Medication    heparin (Porcine) 5000 UNIT/ML injection 5,000 Units                Code Status: Not on file  Sutton: No urinary catheter in place  Sutton Duration (in days):   Central line:    CA:     Discharge is dependent on: progress  At this point Mr. Olsen is expected to be discharge to: home    The 21st Century Cures Act makes medical notes like these available to patients in the interest of transparency. Please be advised this is a medical document. Medical documents are intended to carry relevant information, facts as evident, and the clinical opinion of the practitioner. The medical note is intended as peer to peer communication and may appear blunt or direct. It is written in medical language and may contain  abbreviations or verbiage that are unfamiliar.                         [1]    losartan  25 mg Oral Daily    glucagon  1 mg Subcutaneous On Call    sodium ferric gluconate  125 mg Intravenous Daily    pantoprazole  40 mg Intravenous Q24H    heparin  5,000 Units Subcutaneous Q8H BISI    acetaminophen  1,000 mg Oral Q8H BISI    rosuvastatin  40 mg Oral Daily    folic acid  1 mg Oral Daily    piperacillin-tazobactam  3.375 g Intravenous Q8H

## 2025-04-18 NOTE — PROGRESS NOTES
[address] [address]  ?  04/18/25  ?  Re: Moises Olsen  ?  To Whom It May Concern:    Moises Olsen was admitted to [facility] on 4/12/2025 and is currently still an inpatient, further restriction will be given when patient is discharged.    Please excuse Moises Olsen from attending work for these days.        Patient will follow up with their primary care physician upon discharge.   Further restrictions and work excuse will be based on primary care physician's evaluation.  ?  Thank you,    Yulia Watts MD, MD  [facility]

## 2025-04-18 NOTE — PROGRESS NOTES
Orthostatic Blood Pressure   04/18/25 0747 04/18/25 0749 04/18/25 0750   Vitals   BP (!) 161/68 (!) 162/71 (!) 173/77   MAP (mmHg) 92 93 (!) 101   BP Location Right arm Right arm Right arm   BP Method Automatic Automatic Automatic   Patient Position Lying Sitting Standing

## 2025-04-18 NOTE — OPERATIVE REPORT
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

## 2025-04-19 VITALS
SYSTOLIC BLOOD PRESSURE: 165 MMHG | TEMPERATURE: 98 F | OXYGEN SATURATION: 94 % | WEIGHT: 233 LBS | BODY MASS INDEX: 36.57 KG/M2 | DIASTOLIC BLOOD PRESSURE: 71 MMHG | HEART RATE: 63 BPM | RESPIRATION RATE: 18 BRPM | HEIGHT: 67.01 IN

## 2025-04-19 LAB
BASOPHILS # BLD AUTO: 0.08 X10(3) UL (ref 0–0.2)
BASOPHILS NFR BLD AUTO: 1.8 %
EOSINOPHIL # BLD AUTO: 0.26 X10(3) UL (ref 0–0.7)
EOSINOPHIL NFR BLD AUTO: 5.7 %
ERYTHROCYTE [DISTWIDTH] IN BLOOD BY AUTOMATED COUNT: 32.2 %
HCT VFR BLD AUTO: 31 % (ref 39–53)
HGB BLD-MCNC: 8.9 G/DL (ref 13–17.5)
IMM GRANULOCYTES # BLD AUTO: 0.03 X10(3) UL (ref 0–1)
IMM GRANULOCYTES NFR BLD: 0.7 %
LYMPHOCYTES # BLD AUTO: 0.92 X10(3) UL (ref 1–4)
LYMPHOCYTES NFR BLD AUTO: 20.1 %
MCH RBC QN AUTO: 21.1 PG (ref 26–34)
MCHC RBC AUTO-ENTMCNC: 28.7 G/DL (ref 31–37)
MCV RBC AUTO: 73.6 FL (ref 80–100)
MONOCYTES # BLD AUTO: 0.39 X10(3) UL (ref 0.1–1)
MONOCYTES NFR BLD AUTO: 8.5 %
NEUTROPHILS # BLD AUTO: 2.89 X10 (3) UL (ref 1.5–7.7)
NEUTROPHILS # BLD AUTO: 2.89 X10(3) UL (ref 1.5–7.7)
NEUTROPHILS NFR BLD AUTO: 63.2 %
PLATELET # BLD AUTO: 213 10(3)UL (ref 150–450)
PLATELETS.RETICULATED NFR BLD AUTO: 3.5 % (ref 0–7)
RBC # BLD AUTO: 4.21 X10(6)UL (ref 4.3–5.7)
WBC # BLD AUTO: 4.6 X10(3) UL (ref 4–11)

## 2025-04-19 PROCEDURE — 99239 HOSP IP/OBS DSCHRG MGMT >30: CPT | Performed by: HOSPITALIST

## 2025-04-19 NOTE — PROGRESS NOTES
Case Management Assessment  Initial Evaluation    Date/Time of Evaluation: 1/18/2023 1:18 PM  Assessment Completed by: Kendra Isidro RN    If patient is discharged prior to next notation, then this note serves as note for discharge by case management. Patient Name: Zay Alicia                   YOB: 1983  Diagnosis: Colitis [K52.9]  Rectal bleeding [K62.5]  Leukocytosis, unspecified type [D72.829]                   Date / Time: 1/17/2023  4:06 PM  Location: [address] [place] [address]     Patient Admission Status: Inpatient   Readmission Risk (Low < 19, Mod (19-27), High > 27): Readmission Risk Score: 10.6    Current PCP: Kailyn Baum, DO  PCP verified by CM? Yes    Chart Reviewed: Yes      History Provided by: Patient  Patient Orientation: Alert and Oriented    Patient Cognition: Alert    Hospitalization in the last 30 days (Readmission):  No    If yes, Readmission Assessment in CM Navigator will be completed. Advance Directives:      Code Status: Full Code   Patient's Primary Decision Maker is: Patient Declined (Legal Next of Kin Remains as Decision Maker)      Discharge Planning:    Patient lives with: Parent (Mom) Type of Home: Trailer/Mobile Home  Primary Care Giver: Self  Patient Support Systems include: Parent   Current Financial resources: Medicaid, Food [place], Other (Comment) (Disability)  Current community resources: Transportation  Current services prior to admission: Durable Medical Equipment            Current DME: Cane            Type of Home Care services:  None    ADLS  Prior functional level: Independent in ADLs/IADLs  Current functional level: Independent in ADLs/IADLs    Family can provide assistance at DC: No  Would you like Case Management to discuss the discharge plan with any other family members/significant others, and if so, who?  No  Plans to Return to Present Housing: Yes  Other Identified Issues/Barriers to RETURNING to current housing: None  Potential Assistance needed at Gastroenterology Progress Note  Patient Name: Moises Olsen  Chief Complaint: Anemia  S: Pt denies melena, hematochezia or worsening abdominal pain. He notes mild discomfort at surgery site.   O: /65   Pulse 64   Temp 98.1 °F (36.7 °C) (Oral)   Resp 18   Ht 5' 7.01\" (1.702 m)   Wt 233 lb 0.4 oz (105.7 kg)   SpO2 93%   BMI 36.48 kg/m²   Gen: AAOx3  CV: RRR with normal S1 / S2  Resp: No increased respiratory effort  Abd: (+)BS, soft, non-tender, non-distended; no rebound or guarding  Ext: No edema or cyanosis  Skin: Warm and dry  Laboratory Data:    Recent Labs   Lab 04/12/25  1210 04/12/25  2204 04/14/25  0601   GLU 85 105* 89   BUN 17 17 12   CREATSERUM 0.98 1.18 0.89   CA 9.8 9.5 9.7    143 139   K 3.8 3.8 4.0    107 103   CO2 27.0 26.0 27.0     Recent Labs   Lab 04/17/25  0709   RBC 4.07*   HGB 8.3*   HCT 29.8*   MCV 73.2*   MCH 20.4*   MCHC 27.9*   RDW 30.9   NEPRELIM 3.87   WBC 6.0   .0       Recent Labs   Lab 04/12/25  1210 04/12/25  2204   ALT 8* 7*   AST 14 19       Assessment/Plan:  65 yo male with HTN, dyslipidemia, DM and who also has a history of prior duodenal ulcer disease presenting with recurrent anemia. Pt with appendicitis and to go to OR today. Pt was noted to have GILMA with no overt bleeding at this time. Plan was for OP capsule, given recent EGD/Colon with DU 1/2025 but with persistent anemia.   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 Gastroenterology  [phone]         discharge: N/A            Potential DME:    Patient expects to discharge to: Trailer/mobile home  Plan for transportation at discharge: Other (see comment) (Either grandma if she will come from [place]; may need cab with [facility])    Financial    Payor: [address] / Plan: Gideon Joy / Product Type: *No Product type* /     Does insurance require precert for SNF: Yes    Potential assistance Purchasing Medications: No  Meds-to-Beds request: Yes      Mitzy Napoles [id number] [facility], [address] [place] [address]  Phone: [phone] Fax: [phone]      Notes:    Factors facilitating achievement of predicted outcomes: Family support, Motivated, Cooperative, Pleasant, and Has needed Durable Medical Equipment at home    Barriers to discharge: Pain and Medical complications    Additional Case Management Notes: Admitted through ED with abd pain and rectal bleeding. 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 WBC 23.0 and 18.1. Lactic acid 1.4. Afebrile. Tachycardia, rate . Room air. Consulting GI. Bentyl qid, Neurontin tid, Lisinopril daily, Toprol XL bid, Morphine iv prn, Zyprexa bid, Aldactone daily, Lipitor daily, Klonopin prn anxiety, Protonix iv q12hr. Zosyn iv x1. Procedure:   1/17 CT abd/pelvis: Significant inflammatory changes from the splenic flexure into the rectum concerning primarily for inflammatory bowel disease, infectious etiology not excluded. The Plan for Transition of Care is related to the following treatment goals of Colitis [K52.9]  Rectal bleeding [K62.5]  Leukocytosis, unspecified type [D72.829]    Patient Goals/Plan/Treatment Preferences: Plans home with mother. Mother is disabled, only uses insurance for transportation.  States her grandmother lives in [place], if she is willing to drive here she may be able to transport her home. SW notified pt may need assistance with getting home. She has a cane that she uses. Denies other needs. Transportation/Food Security/Housekeeping Addressed: No issues identified.      Mihai Bruner RN  Case Management Department

## 2025-04-19 NOTE — PLAN OF CARE
Patient is alert and oriented, ambulatory in the room. BP still elevated, new BP meds scheduled. No new complaints overnight, no signs of bleeding noted. Safety precautions in place, plan of care ongoing.     Problem: HEMATOLOGIC - ADULT  Goal: Maintains hematologic stability  Description: INTERVENTIONS- Assess for signs and symptoms of bleeding or hemorrhage- Monitor labs and vital signs for trends- Administer supportive blood products/factors, fluids and medications as ordered and appropriate- Administer supportive blood products/factors as ordered and appropriate  Outcome: Progressing

## 2025-04-19 NOTE — PLAN OF CARE
Patient denies abdominal pain. Abdominal incision with steri strips , clean dry and intact. Patient's vital signs stable. Low fiber diet tolerated well.1635- /71, patient denies dizziness, Dr. Watts notified- no new order.

## 2025-04-19 NOTE — DISCHARGE SUMMARY
[facility]     Moises Olsen Patient Status:  Inpatient    7/15/1960 MRN [id number]   [facility] 4NW-A Attending Yulia Watts MD   Hosp Day # 6 PCP Osiel Rader MD     Date of Admission: 2025  Date of Discharge:   ***    Discharge Disposition: Home or Self Care    Discharge Diagnosis:  ***    History of Present Illness: ***    Brief Synopsis: ***    Lace+ Score: 49  59-90 High Risk  29-58 Medium Risk  0-28   Low Risk       TCM Follow-Up Recommendation:  {Care Managers will evaluate the need for follow-up for all patients ages 50+, and high/moderate risk patients ages 25-49. Low risk patients (LACE < 29) will only be evaluated if the \"Still recommend for TCM follow-up\" option is selected from this list.:[id number]}      Procedures during hospitalization:   ***    Incidental or significant findings and recommendations (brief descriptions):  ***    Lab/Test results pending at Discharge:   ***    Consultants:  ***    Discharge Medication List:     Discharge Medications        START taking these medications        Instructions Prescription details   oxyCODONE 5 MG Tabs      Take 1 tablet (5 mg total) by mouth every 6 (six) hours as needed.   Quantity: 5 tablet  Refills: 0            CONTINUE taking these medications        Instructions Prescription details   atorvastatin 40 MG Tabs  Commonly known as: Lipitor      Take 1 tablet (40 mg total) by mouth nightly.   Quantity: 90 tablet  Refills: 1     Ferrous Sulfate 325 (65 Fe) MG Tabs      Take 1 tablet (325 mg total) by mouth every other day.   Quantity: 60 tablet  Refills: 0     losartan 50 MG Tabs  Commonly known as: Cozaar      1 tablet (50 mg total).   Refills: 0     pantoprazole 40 MG Tbec  Commonly known as: Protonix      Take 1 tablet (40 mg total) by mouth every morning before breakfast.   Refills: 0     rosuvastatin 40 MG Tabs  Commonly known as: Crestor      Take 1 tablet (40 mg total) by mouth in the morning.    Refills: 0     Tadalafil 10 MG Tabs      Take 1 tablet (10 mg total) by mouth daily as needed for Erectile Dysfunction.   Quantity: 20 tablet  Refills: 0            STOP taking these medications      Bismuth Subsalicylate 525 MG Tabs        losartan-hydroCHLOROthiazide 100-25 MG Tabs  Commonly known as: Hyzaar        metroNIDAZOLE 250 MG Tabs  Commonly known as: Flagyl        Tetracycline HCl 500 MG Caps                  Where to Get Your Medications        These medications were sent to [facility]/pharmacy #[id number] - [facility] [address] [facility] AT [facility] OF [facility], [phone], [phone]  [address] [facility] [facility], [facility] [address]      Phone: [phone]   oxyCODONE 5 MG Tabs         [place] reviewed: ***    Follow-up appointment:   [facility]  [address] [id number] [phone]    Follow up  Follow up in 10-14 days, sooner if needed.    Calra Mcdonnell MD  [address] [id number] [phone]    Follow up  As needed    Lj Hester MD  [address] [address] [address] [phone]    Follow up on 5/5/2025  Appointment for labs only at the cancer center in [place] in preparation for physician appointment on 5/7/25.    Lj Hester MD  [address] [address] [address] [phone]    Follow up on 5/7/2025  11:15a appointment as follow up for anemia. If needed, IV iron will be given on the same day.    Jacques Mack MD  [address]   [facility] [id number] [phone]    Follow up in 4 week(s)      Osiel Rader MD  [address] [facility]  [address]  Count includes the [facility] [address] [phone]    Follow up in 1 week(s)      Appointments for Next 30 Days 4/19/2025 - 5/19/2025        Date Arrival Time Visit Type Length Department Provider     5/5/2025 10:30 AM  PERIPHERAL LAB [2088] 10 min [facility] OOT    Patient Instructions:         Location Instructions:     Your appointment is scheduled  at the [facility] in [place]. The address is [address] [place] [address]. Please park in the GREEN LOT and enter through the CANCER CENTER ENTRANCE of BUILDING A.. Once you arrive, please register at the Cancer [place]  on the 1st floor.  Masks are optional for all patients and visitors, unless otherwise indicated. No care partners/visitors under 18 years of age are allowed in the infusion room.               5/7/2025 11:15 AM  FOLLOW UP-HEM/ONC [2451] 15 min [facility] Hematology Oncology - [place] Lj Hester MD    Patient Instructions:         Location Instructions:     **IF YOU NEED LABWORK OR AN INFUSION ALONG WITH YOUR APPOINTMENT, YOU MUST CALL TO SCHEDULE.**  Your appointment is scheduled at the [facility] in [place]. The address is [address] [place] [address]. Please park in the GREEN LOT and enter through the CANCER CENTER ENTRANCE of BUILDING A. Once you arrive, please register at the Cancer [place]  on the 1st floor.  Masks are optional for all patients and visitors, unless otherwise indicated.               5/7/2025 11:30 AM  INFUSION [1980] 60 min [facility] Infusion [facility] PF [place] RN2    Patient Instructions:         Location Instructions:     Your appointment is scheduled at the [facility] in [place]. The address is [address] [place] [address]. Please park in the GREEN LOT and enter through the CANCER CENTER ENTRANCE of BUILDING A.. Once you arrive, please register at the Cancer Center  on the 1st floor.  Masks are optional for all patients and visitors, unless otherwise indicated. No care partners/visitors under 18 years of age are allowed in the infusion room.                      Vital signs:  Temp:  [97.9 °F (36.6 °C)-98.3 °F (36.8 °C)] 98.3 °F (36.8 °C)  Pulse:  [59-70] 65  Resp:  [18] 18  BP: (156-183)/(54-79) 156/73  SpO2:  [93 %-98  %] 94 %    Physical Exam:    General: No acute distress   Lungs: clear to auscultation  Cardiovascular: S1, S2  Abdomen: Soft  ***    -----------------------------------------------------------------------------------------------  PATIENT DISCHARGE INSTRUCTIONS: See electronic chart    Yulia Watts MD    Total time spent on discharge planning:  *** minutes     The 21st Century Cures Act makes medical notes like these available to patients in the interest of transparency. Please be advised this is a medical document. Medical documents are intended to carry relevant information, facts as evident, and the clinical opinion of the practitioner. The medical note is intended as peer to peer communication and may appear blunt or direct. It is written in medical language and may contain abbreviations or verbiage that are unfamiliar.

## 2025-04-21 ENCOUNTER — PATIENT OUTREACH (OUTPATIENT)
Dept: CASE MANAGEMENT | Age: 65
End: 2025-04-21

## 2025-04-21 NOTE — PROGRESS NOTES
Transitional Care Management   Discharge Date: 25  Contact Date: 2025    Assessment:  TCM Initial Assessment    General:  Assessment completed with: Patient  Patient Subjective: Pt doing better since discharge.  Chief Complaint: Symptomatic anemia  Verify patient name and  with patient/ caregiver: Yes    Hospital Stay/Discharge:  Prior to leaving the hospital were your Discharge Instructions reviewed with you?: Yes  Did you receive a copy of your written Discharge Instructions?: Yes  Do you feel better or worse since you left the hospital or emergency department?: Better    Follow - Up Appointment:  Do you have a follow-up appointment?: No  Are there any barriers to getting to your follow-up appointment?: No    Home Health/DME:  Prior to leaving the hospital was Home Health (HH) arranged for you?: No     Prior to leaving the hospital or emergency department was Durable Medical Equipment (DME), medical supplies, or infusions arranged for you?: No  Are DME/medical supply/infusions needs identified by staff during this assessment?: No     Medications/Diet:       Were you given a different diet per your Discharge Instructions?: No     Questions/Concerns:  Do you have any questions or concerns that have not been discussed?: No         Follow-up Appointments:  Your appointments       Date & Time Appointment Department ([place])    2025 12:40 PM CDT Hospital Follow Up with Osiel Rader MD [facility] ([facility])        May 05, 2025 10:30 AM CDT PERIPHERAL LAB with KAYLA MATA [facility])        May 07, 2025 11:15 AM CDT HEMATOLOGY ONCOLOGY FOLLOW UP with Lj Hester MD [facility] Hematology Oncology - [facility])        May 07, 2025 11:30 AM CDT Infusion Visit with KAYLA PÉREZ RN2 [facility]  [place] ([facility])              [facility] [place]  [address]  [facility] [address]  [phone] [facility] Hematology Oncology - [facility]  [address]  [facility] [address]  [phone] [facility] Infusion Center [facility]  [address]  [facility] [address]  [phone]            Transitional Care Clinic  Was TCC Ordered: No  Was TCC Scheduled: No, Explain scheduled with PCP.    Primary Care Provider (If no TCC appointment)  Does patient already have a PCP appointment scheduled? No  Care Manager Scheduled PCP office TCM appointment with patient    Specialist  Does the patient have any other follow-up appointment(s) that need to be scheduled? Yes   -If yes: Care Manager reviewed upcoming specialist appointments with patient: Yes   -Does the patient need assistance scheduling appointment(s): No      Book By Date: 5/3/25

## 2025-04-21 NOTE — PROGRESS NOTES
Left message on mailbox for patient to call care manager back for transitional care management/hospital follow-up call.  Care  information included in message.    1st attempt    Statement Selected

## 2025-04-21 NOTE — PAYOR COMM NOTE
--------------  DISCHARGE REVIEW    Payor: [employer]  Subscriber #:  [id number]  Authorization Number: [id number]    Admit date: 4/13/25  Admit time:   1:14 AM  Discharge Date: 4/19/2025  5:50 PM     Admitting Physician: Greta Howard MD  Attending Physician:  No att. providers found  Primary Care Physician: Osiel Rader MD                                            NURSING DISCHARGE NOTE     Discharged Home via Ambulatory.  Accompanied by Family member  Belongings Taken by patient/family.            Electronically signed by Porfirio Doyle RN at 4/19/2025  4:51 PM

## 2025-04-23 ENCOUNTER — APPOINTMENT (OUTPATIENT)
Age: 65
End: 2025-04-23
Attending: SPECIALIST
Payer: COMMERCIAL

## 2025-04-25 ENCOUNTER — OFFICE VISIT (OUTPATIENT)
Facility: LOCATION | Age: 65
End: 2025-04-25
Payer: COMMERCIAL

## 2025-04-25 VITALS
TEMPERATURE: 98 F | SYSTOLIC BLOOD PRESSURE: 152 MMHG | DIASTOLIC BLOOD PRESSURE: 81 MMHG | HEART RATE: 70 BPM | OXYGEN SATURATION: 97 %

## 2025-04-25 DIAGNOSIS — Z98.890 POST-OPERATIVE STATE: Primary | ICD-10-CM

## 2025-04-25 PROBLEM — K35.80 ACUTE APPENDICITIS, UNSPECIFIED ACUTE APPENDICITIS TYPE: Status: RESOLVED | Noted: 2025-04-13 | Resolved: 2025-04-25

## 2025-04-25 PROCEDURE — 99024 POSTOP FOLLOW-UP VISIT: CPT | Performed by: PHYSICIAN ASSISTANT

## 2025-04-25 NOTE — PROGRESS NOTES
Post Operative Visit Note       Active Problems  1. Post-operative state         Chief Complaint   Chief Complaint   Patient presents with    Post-Op     PO - ACUTE LAPAROSCOPIC APPENDECTOMY WITH LOCALIZED PERITONITIS W/ BCK 4/14. Feeling good, pain is going away. Thinks he is ready to go back to work.            History of Present Illness   The patient presents today for postoperative visit following laparoscopic appendectomy on 4/14/2025 by Dr. Mcdonnell    The patient states that he is overall doing well since his surgery.  He denies new complaints.  He reports mild incisional tenderness.  He denies nausea or vomiting.  He is tolerating diet well.  He denies fever or chills.    Allergies  Moises has no known allergies.    Past Medical / Surgical / Social / Family History    The past medical and past surgical history have been reviewed by me today.     Past Medical History:    Acute appendicitis, unspecified acute appendicitis type    Anemia    Black stools    Blood in the stool    Diabetes (HCC)    Dizziness    Essential hypertension    Food intolerance    GI bleed    History of blood transfusion    Hyperlipidemia    Wears glasses     Past Surgical History:   Procedure Laterality Date    Appendectomy      Colonoscopy N/A 01/22/2025    Procedure: COLONOSCOPY with cold snare polypectomy;  Surgeon: Geoffrey Milan MD;  Location:  ENDOSCOPY       The family history and social history have been reviewed by me today.    History reviewed. No pertinent family history.  Social History     Socioeconomic History    Marital status: Single   Tobacco Use    Smoking status: Former     Current packs/day: 0.50     Average packs/day: 0.5 packs/day for 30.0 years (15.0 ttl pk-yrs)     Types: Cigarettes    Smokeless tobacco: Current    Tobacco comments:     Nicotine disposable vape use    Vaping Use    Vaping status: Every Day    Substances: Nicotine    Devices: Disposable, Pt states he is trying to quit   Substance and Sexual  Activity    Alcohol use: Not Currently    Drug use: Never        Current Outpatient Medications:     rosuvastatin 40 MG Oral Tab, Take 1 tablet (40 mg total) by mouth in the morning., Disp: , Rfl:     losartan 50 MG Oral Tab, 1 tablet (50 mg total)., Disp: , Rfl:     pantoprazole 40 MG Oral Tab EC, Take 1 tablet (40 mg total) by mouth every morning before breakfast., Disp: , Rfl:     Tadalafil 10 MG Oral Tab, Take 1 tablet (10 mg total) by mouth daily as needed for Erectile Dysfunction., Disp: 20 tablet, Rfl: 0    Ferrous Sulfate 325 (65 Fe) MG Oral Tab, Take 1 tablet (325 mg total) by mouth every other day., Disp: 60 tablet, Rfl: 0      Review of Systems  A 10 point Review of Systems has been completed by me today and is negative except as above in the HPI.    Physical Findings   /81 (BP Location: Left arm, Patient Position: Sitting, Cuff Size: adult)   Pulse 70   Temp 98.2 °F (36.8 °C) (Temporal)   SpO2 97%   Gen/psych: alert and oriented, cooperative, no apparent distress  Cardiovascular: regular rate  Respiratory: respirations unlabored, no wheeze  Abdominal: soft, non-tender, non-distended, no guarding/rebound  Incisions: Incisions are healing well.  There is no surrounding erythema or induration.  Steri-Strips are in place.  Steri-Strips removed at the bedside today without difficulty.         Assessment/Plan  1. Post-operative state        1. The patient is doing well following laparoscopic appendectomy.  2. The patient is to continue with diet as tolerated.  3. The patient is to continue with lifting restrictions of no more than 20 pounds for 4 weeks from the date of surgery.  4. Surgical pathology was discussed with the patient.  5. All questions or concerns were answered.  6. The patient is to return to the clinic as needed.  The patient may return to work on 4/28/2025 with lifting restrictions of no more than 30 pounds until 5/12/2025.         No orders of the defined types were placed in this  encounter.       Imaging & Referrals   None    Follow Up  Return if symptoms worsen or fail to improve.    Starr Blair PA-C  [facility] General Surgery  4/25/2025  1:05 PM

## 2025-04-28 ENCOUNTER — OFFICE VISIT (OUTPATIENT)
Dept: INTERNAL MEDICINE CLINIC | Facility: CLINIC | Age: 65
End: 2025-04-28
Payer: COMMERCIAL

## 2025-04-28 VITALS
TEMPERATURE: 98 F | HEART RATE: 82 BPM | BODY MASS INDEX: 34.69 KG/M2 | HEIGHT: 67 IN | WEIGHT: 221 LBS | SYSTOLIC BLOOD PRESSURE: 136 MMHG | DIASTOLIC BLOOD PRESSURE: 70 MMHG | OXYGEN SATURATION: 96 %

## 2025-04-28 DIAGNOSIS — Z12.5 SCREENING FOR PROSTATE CANCER: Primary | ICD-10-CM

## 2025-04-28 DIAGNOSIS — D62 ACUTE BLOOD LOSS ANEMIA: ICD-10-CM

## 2025-04-28 RX ORDER — PANTOPRAZOLE SODIUM 40 MG/1
40 TABLET, DELAYED RELEASE ORAL
Qty: 90 TABLET | Refills: 0 | Status: SHIPPED | OUTPATIENT
Start: 2025-04-28

## 2025-04-28 NOTE — PATIENT INSTRUCTIONS
Please follow-up with GI doctors.  Please do magnetic resonance enterography.  Check your blood tests for anemia and PSA.

## 2025-04-28 NOTE — PROGRESS NOTES
Subjective:   Moises Olsen is a 64 year old male who presents for Hospital F/U     The patient was admitted in the hospital with severe acute blood loss anemia.  Extensive work is inconclusive to find out the source of bleeding.  Magnetic resonance enterography is planned.    The patient does not have any melena but saw some red blood when wiping.    History/Other:    Chief Complaint Reviewed and Verified  No Further Nursing Notes to   Review  Tobacco Reviewed  Medical History Reviewed  Surgical History   Reviewed  Family History Reviewed  Social History Reviewed         Tobacco:  Tobacco Use[1]  E-Cigarettes/Vaping       Questions Responses    E-Cigarette Use Current Every Day User          E-Cigarette/Vaping Substances       Questions Responses    Nicotine Yes    THC No    CBD No    Flavoring No          E-Cigarette/Vaping Devices       Questions Responses    Disposable Yes    Pre-filled or Refillable Cartridge No    Refillable Tank No    Pre-filled Pod No    Other Pt states he is trying to quit           Tobacco cessation counseling for <3 minutes.      Current Medications[2]      Review of Systems:  Pertinent items are noted in HPI.  A comprehensive review of systems was negative.      Objective:   /70 (BP Location: Left arm, Patient Position: Sitting, Cuff Size: adult)   Pulse 82   Temp 98.1 °F (36.7 °C) (Temporal)   Ht 5' 7\" (1.702 m)   Wt 221 lb (100.2 kg)   SpO2 96%   BMI 34.61 kg/m²  Estimated body mass index is 34.61 kg/m² as calculated from the following:    Height as of this encounter: 5' 7\" (1.702 m).    Weight as of this encounter: 221 lb (100.2 kg).  General condition: Not in distress.    HEENT: Atraumatic normocephalic  No cervical or submandibular lymphadenopathy  Chest: Clear to auscultate  Heart: S1-S2 ,no murmur  Abdomen: Soft non tender, no palpable organomegaly  Neurological examination: No facial asymmetry.  No lateralizing neurological signs.  Mental state examination: Good  eye to eye contact.  Normal mood and behavior.  Engaged in meaningful conversations.  Extremities:  Normal upper extremities  Legs: No edema        Assessment & Plan:   1. Screening for prostate cancer (Primary)  -     PSA Total, Screen; Future; Expected date: 04/28/2025  Other orders  -     Pantoprazole Sodium; Take 1 tablet (40 mg total) by mouth every morning before breakfast.  Dispense: 90 tablet; Refill: 0  #2: Acute blood loss anemia: Status post 4 packed RBC transfusion.  Follows with GI.  Next follow-up is with heme-onc on May 6.  He will be also going for iron transfusion next week.    #3: Class I obesity with prediabetes.  Weight Loss Advice :  A) Eat plant based diet and avoid ultra processed and fast foods.  B) Your portions should be a dinner plate. 1/2 should be vegetables, 1/4 lean protein (chicken, fish, turkey. Tofu), and 1/4 can be carbohydrates.   C)  Your main drink should be water rather than soda or alcohol or sweet coffees  D). Please try to exercise ( brisk walking or jogging) at least 30 minutes five times/week; and do muscle strengthening exercises ( lifting the weight, doing push ups or yoga)  twice a week    E). It is very important to get 7-9 hours of sleep per night        No follow-ups on file.    Osiel Rader MD, 4/28/2025, 12:55 PM         [1]   Social History  Tobacco Use   Smoking Status Former    Current packs/day: 0.50    Average packs/day: 0.5 packs/day for 30.0 years (15.0 ttl pk-yrs)    Types: Cigarettes   Smokeless Tobacco Current   Tobacco Comments    Nicotine disposable vape use    [2]   Current Outpatient Medications   Medication Sig Dispense Refill    pantoprazole 40 MG Oral Tab EC Take 1 tablet (40 mg total) by mouth every morning before breakfast. 90 tablet 0    rosuvastatin 40 MG Oral Tab Take 1 tablet (40 mg total) by mouth daily.      losartan 50 MG Oral Tab Take 1 tablet (50 mg total) by mouth daily.      Tadalafil 10 MG Oral Tab Take 1 tablet (10 mg total) by  mouth daily as needed for Erectile Dysfunction. 20 tablet 0    Ferrous Sulfate 325 (65 Fe) MG Oral Tab Take 1 tablet (325 mg total) by mouth every other day. 60 tablet 0

## 2025-04-30 ENCOUNTER — APPOINTMENT (OUTPATIENT)
Age: 65
End: 2025-04-30
Attending: SPECIALIST
Payer: COMMERCIAL

## 2025-04-30 NOTE — TELEPHONE ENCOUNTER
Requesting ferrous sulfate  LOV: 04/28/25  RTC: 2 months  Last Relevant Labs:   Filled: 02/07/25 #60 with 0 refills    Future Appointments   Date Time Provider Department Center   5/5/2025 10:30 AM PF OOT PF [facility]   5/7/2025 11:15 AM Lj Hester MD PF [facility]   5/7/2025 11:30 AM PF [place] RN2 PF [facility]

## 2025-05-01 RX ORDER — FERROUS SULFATE 325(65) MG
1 TABLET ORAL EVERY OTHER DAY
Qty: 45 TABLET | Refills: 1 | Status: SHIPPED | OUTPATIENT
Start: 2025-05-01

## 2025-05-05 ENCOUNTER — NURSE ONLY (OUTPATIENT)
Age: 65
End: 2025-05-05
Attending: SPECIALIST
Payer: COMMERCIAL

## 2025-05-05 DIAGNOSIS — Z87.19 HISTORY OF GI BLEED: ICD-10-CM

## 2025-05-05 DIAGNOSIS — Z87.19 HISTORY OF GI BLEED: Primary | ICD-10-CM

## 2025-05-05 LAB
BASOPHILS # BLD AUTO: 0.12 X10(3) UL (ref 0–0.2)
BASOPHILS NFR BLD AUTO: 2 %
DEPRECATED HBV CORE AB SER IA-ACNC: 22 NG/ML (ref 50–336)
EOSINOPHIL # BLD AUTO: 0.28 X10(3) UL (ref 0–0.7)
EOSINOPHIL NFR BLD AUTO: 4.7 %
HCT VFR BLD AUTO: 41 % (ref 39–53)
HGB BLD-MCNC: 12.2 G/DL (ref 13–17.5)
IMM GRANULOCYTES # BLD AUTO: 0.01 X10(3) UL (ref 0–1)
IMM GRANULOCYTES NFR BLD: 0.2 %
IRON SATN MFR SERPL: 10 % (ref 20–50)
IRON SERPL-MCNC: 41 UG/DL (ref 65–175)
LYMPHOCYTES # BLD AUTO: 1.36 X10(3) UL (ref 1–4)
LYMPHOCYTES NFR BLD AUTO: 23.1 %
MCH RBC QN AUTO: 23 PG (ref 26–34)
MCHC RBC AUTO-ENTMCNC: 29.8 G/DL (ref 31–37)
MCV RBC AUTO: 77.4 FL (ref 80–100)
MONOCYTES # BLD AUTO: 0.54 X10(3) UL (ref 0.1–1)
MONOCYTES NFR BLD AUTO: 9.2 %
NEUTROPHILS # BLD AUTO: 3.59 X10 (3) UL (ref 1.5–7.7)
NEUTROPHILS # BLD AUTO: 3.59 X10(3) UL (ref 1.5–7.7)
NEUTROPHILS NFR BLD AUTO: 60.8 %
PLATELET # BLD AUTO: 297 10(3)UL (ref 150–450)
PLATELET MORPHOLOGY: NORMAL
RBC # BLD AUTO: 5.3 X10(6)UL (ref 4.3–5.7)
TOTAL IRON BINDING CAPACITY: 421 UG/DL (ref 250–425)
TRANSFERRIN SERPL-MCNC: 345 MG/DL (ref 215–365)
WBC # BLD AUTO: 5.9 X10(3) UL (ref 4–11)

## 2025-05-07 ENCOUNTER — OFFICE VISIT (OUTPATIENT)
Age: 65
End: 2025-05-07
Attending: SPECIALIST
Payer: COMMERCIAL

## 2025-05-07 VITALS — SYSTOLIC BLOOD PRESSURE: 116 MMHG | HEART RATE: 67 BPM | DIASTOLIC BLOOD PRESSURE: 62 MMHG

## 2025-05-07 VITALS
BODY MASS INDEX: 35 KG/M2 | TEMPERATURE: 98 F | OXYGEN SATURATION: 97 % | WEIGHT: 222.5 LBS | DIASTOLIC BLOOD PRESSURE: 72 MMHG | SYSTOLIC BLOOD PRESSURE: 150 MMHG | HEART RATE: 75 BPM | RESPIRATION RATE: 18 BRPM

## 2025-05-07 DIAGNOSIS — D50.8 IRON REFRACTORY IRON DEFICIENCY ANEMIA: Primary | ICD-10-CM

## 2025-05-07 DIAGNOSIS — Z71.6 ENCOUNTER FOR SMOKING CESSATION COUNSELING: ICD-10-CM

## 2025-05-07 DIAGNOSIS — D50.0 IRON DEFICIENCY ANEMIA DUE TO CHRONIC BLOOD LOSS: Primary | ICD-10-CM

## 2025-05-07 DIAGNOSIS — D50.8 IRON REFRACTORY IRON DEFICIENCY ANEMIA: ICD-10-CM

## 2025-05-07 DIAGNOSIS — I10 PRIMARY HYPERTENSION: ICD-10-CM

## 2025-05-07 DIAGNOSIS — D50.0 IRON DEFICIENCY ANEMIA SECONDARY TO BLOOD LOSS (CHRONIC): ICD-10-CM

## 2025-05-07 DIAGNOSIS — R03.0 ELEVATED BLOOD PRESSURE READING: ICD-10-CM

## 2025-05-07 DIAGNOSIS — Z87.19 HISTORY OF GI BLEED: ICD-10-CM

## 2025-05-07 NOTE — PROGRESS NOTES
Education Record    Learner:  Patient    Disease / Diagnosis: here for MD visit and Infed    Barriers / Limitations:  None   Comments:    Method:  Brief focused and Discussion   Comments:    General Topics:  Medication, Side effects and symptom management, and Plan of care reviewed   Comments:    Outcome:  Shows understanding   Comments:    Infed test dose given without incident. Observed for 15 minutes. Infed given. Tolerated well. Post vitals checked. Reviewed next appointment, 8/8 11 am for labs only. Pt uses MyChart. Discharged home in stable condition, no new complaints.

## 2025-05-07 NOTE — PROGRESS NOTES
Patient is here for post hospital MD follow up and IV iron infusion. Denies any bleeding. No melena or hematochezia.       Education Record    Learner:  Patient    Disease / Diagnosis:  Anemia    Barriers / Limitations:  None   Comments:    Method:  Discussion   Comments:    General Topics:  Plan of care reviewed   Comments:    Outcome:  Shows understanding   Comments:

## 2025-05-09 NOTE — PROGRESS NOTES
[facility] Progress Note      Patient Name: Moises Olsen   YOB: 1960  Medical Record Number: [id number]  Attending Physician: Lj Hester M.D.      The 21st Century Cures Act makes medical notes like these available to patients in the interest of transparency. Please be advised this is a medical document. Medical documents are intended to carry relevant information, facts as evident, and the clinical opinion of the practitioner. The medical note is intended as peer to peer communication and may appear blunt or direct. It is written in medical language and may contain abbreviations or verbiage that are unfamiliar.      Date of Visit: 5/7/2025        Chief Complaint  Iron deficiency anemia - follow up.     History of Present Illness  Moises Olsen is a 64 year old male who on on 12/15/2024 presented to the ED with with epigastric pain and dark stools. He reported that in 02/2024, he was diagnosed with ulcer disease at [facility]. He also has a previous history of H. pylori positive gastric ulcer in 2017.    At that time, laboratory studies show a microcytic hypochromic anemia and iron deficiency. He underwent EGD on 12/16/2024 which showed two ulcers in the duodenal bulb. He was discharged on pantoprazole and sucralfate. Pathology from biopsies of the stomach subsequently showed H. pylori related gastritis. Patient failed to follow up with gastroenterology due to insurance issues.     He then presented again on 01/20/2025 to the ED with black colored stools. On 01/21/2025 he underwent EGD which showed no erosions or ulcerations. On 01/22/2025 he underwent colonoscopy which showed occasional sigmoid diverticulosis, a mid transverse colon sessile polyp, and a distal transverse colon sessile polyp. Pathology showed a tubular adenoma and sessile serrated polyp. He was discharged on PPI and oral iron and was subsequently prescribed triple therapy for H.  pylori.    He was seen by gastroenterology for follow up as on outpatient on 02/10/2025. He was advised to continue oral iron supplementation and to undergo video capsule study when approved by insurance. Hemoglobin on that day was 7.2 g/dl. Vitamin B12 and folic acid levels were normal.     Patient next presented to ED on 04/12/2025 after outpatient laboratory studies showed a hemoglobin of 4.6 g/dl. He denied any black stools but stool for occult blood was positive. He report shortness of breath with exertion. CT abdomen/pelvis w contrast showed findings concerning for acute appendicitis. Ferritin was 2 ng/ml.     During the admission, he underwent appendectomy, received PRBC and IV iron, and had a capsule endoscopy which showed non-bleeding AVMs.    Patient denies melena or bright red blood per rectum. He remains on oral iron therapy and denies any adverse effects.     Past Medical History (historical data, reviewed by physician)   Past Medical History:    Acute appendicitis, unspecified acute appendicitis type    Anemia    Black stools    Blood in the stool    Diabetes (HCC)    Dizziness    Essential hypertension    Food intolerance    GI bleed    History of blood transfusion    Hyperlipidemia    Wears glasses     Past Surgical History (historical data, reviewed by physician)   Past Surgical History:   Procedure Laterality Date    Appendectomy      Colonoscopy N/A 01/22/2025    Procedure: COLONOSCOPY with cold snare polypectomy;  Surgeon: Geoffrey Milan MD;  Location:  ENDOSCOPY     Family History (historical data, reviewed by physician)   No known family history of malignancy.     Social History (historical data, reviewed by physician)   Social History     Socioeconomic History    Marital status: Single   Tobacco Use    Smoking status: Former     Current packs/day: 0.50     Average packs/day: 0.5 packs/day for 30.0 years (15.0 ttl pk-yrs)     Types: Cigarettes    Smokeless tobacco: Current    Tobacco  comments:     Nicotine disposable vape use    Vaping Use    Vaping status: Every Day    Substances: Nicotine    Devices: Disposable, Pt states he is trying to quit   Substance and Sexual Activity    Alcohol use: Not Currently    Drug use: Never     Current Medications   FERROUS SULFATE 325 (65 Fe) MG Oral Tab TAKE 1 TABLET BY MOUTH EVERY OTHER DAY 45 tablet 1    pantoprazole 40 MG Oral Tab EC Take 1 tablet (40 mg total) by mouth every morning before breakfast. 90 tablet 0    rosuvastatin 40 MG Oral Tab Take 1 tablet (40 mg total) by mouth daily.      losartan 50 MG Oral Tab Take 1 tablet (50 mg total) by mouth daily.         Allergies   Mr. Olsen has no known allergies.    Vital Signs   /72 (BP Location: Left arm, Patient Position: Sitting, Cuff Size: large)   Pulse 75   Temp 98 °F (36.7 °C) (Temporal)   Resp 18   Wt 100.9 kg (222 lb 8 oz)   SpO2 97%   BMI 34.85 kg/m²     Physical Examination   Constitutional Well developed and well nourished; in no apparent distress.  Head  Normocephalic and atraumatic.  Eyes  Conjunctiva clear; sclera anicteric.  ENMT  External nose normal; external ears normal.  Respiratory Normal effort; no respiratory distress; clear to auscultation bilaterally.   Cardiovascular Regular rate and rhythm.  Abdomen Not distended.   Neurologic Motor and sensory grossly intact.  Psychiatric Mood and affect appropriate.     Laboratory   Recent Results (from the past week)   CBC W/DIFF [E]    Collection Time: 05/05/25 11:20 AM   Result Value Ref Range    WBC 5.9 4.0 - 11.0 x10(3) uL    RBC 5.30 4.30 - 5.70 x10(6)uL    HGB 12.2 (L) 13.0 - 17.5 g/dL    HCT 41.0 39.0 - 53.0 %    .0 150.0 - 450.0 10(3)uL    MCV 77.4 (L) 80.0 - 100.0 fL    MCH 23.0 (L) 26.0 - 34.0 pg    MCHC 29.8 (L) 31.0 - 37.0 g/dL    RDW      Neutrophil Absolute Prelim 3.59 1.50 - 7.70 x10 (3) uL    Neutrophil Absolute 3.59 1.50 - 7.70 x10(3) uL    Lymphocyte Absolute 1.36 1.00 - 4.00 x10(3) uL    Monocyte Absolute  0.54 0.10 - 1.00 x10(3) uL    Eosinophil Absolute 0.28 0.00 - 0.70 x10(3) uL    Basophil Absolute 0.12 0.00 - 0.20 x10(3) uL    Immature Granulocyte Absolute 0.01 0.00 - 1.00 x10(3) uL    Neutrophil % 60.8 %    Lymphocyte % 23.1 %    Monocyte % 9.2 %    Eosinophil % 4.7 %    Basophil % 2.0 %    Immature Granulocyte % 0.2 %   IRON AND TIBC [E]    Collection Time: 05/05/25 11:20 AM   Result Value Ref Range    Iron 41 (L) 65 - 175 ug/dL    Transferrin 345 215 - 365 mg/dL    Total Iron Binding Capacity 421 250 - 425 ug/dL    % Saturation 10 (L) 20 - 50 %   FERRITIN [E]    Collection Time: 05/05/25 11:20 AM   Result Value Ref Range    Ferritin 22 (L) 50 - 336 ng/mL   Scan slide    Collection Time: 05/05/25 11:20 AM   Result Value Ref Range    Slide Review Slide reviewed,morphology review added    RBC Morphology Scan    Collection Time: 05/05/25 11:20 AM   Result Value Ref Range    RBC Morphology See morphology below (A) Normal, Slide reviewed, see previous RBC morphology.    Platelet Morphology Normal Normal    Hypochromia 1+      Microcytosis 1+      Tear Drop Cells 1+      Ovalocytes 1+       Impression and Plan   1.   Iron deficiency anemia: Laboratory studies this week show a persistent iron deficiency. In Epic, there is documentation that gastroenterology has tried to contact him several times to arrange for follow up. He has not yet scheduled the appointment. He is advised to do so.           I recommend therapy with IV iron dextran today. Patient is in agreement. He will receive an initial test dose. Patient is also advised to continue his oral iron therapy as he has no adverse effects.           Patient is instructed to schedule an appointment for labs in 3 months at which time CBC and iron studies will be repeated. I recommend these be check every 3 months for the next year to confirm stability or gauge rate of iron loss.     2.   Hypertension: Patient's blood pressure today in clinic was >140/90. Patient  advised to contact their primary care provider to address this issue soon.      3.   Tobacco use: Patient currently vapes. Cessation counseling was given. Patient states that he is trying to quit.      Electronically Signed by:     Lj Hester M.D.  System Medical Director, Oncology Services  [facility]

## 2025-06-23 RX ORDER — PANTOPRAZOLE SODIUM 40 MG/1
40 TABLET, DELAYED RELEASE ORAL
Qty: 90 TABLET | Refills: 0 | Status: SHIPPED | OUTPATIENT
Start: 2025-06-23

## (undated) DEVICE — POUCH SPECIMEN WIRE 6X3 250ML

## (undated) DEVICE — SLEEVE COMPR MD KNEE LEN SGL USE KENDALL SCD

## (undated) DEVICE — SYRINGE MED 10ML LL TIP W/O SFTY DISP

## (undated) DEVICE — V2 SPECIMEN COLLECTION MANIFOLD KIT: Brand: NEPTUNE

## (undated) DEVICE — APPLICATOR STD 6IN COT TIP WOOD HNDL ST

## (undated) DEVICE — MEDI-VAC NON-CONDUCTIVE SUCTION TUBING: Brand: CARDINAL HEALTH

## (undated) DEVICE — ENDOPATH ULTRA VERESS INSUFFLATION NEEDLES WITH LUER LOCK CONNECTORS: Brand: ENDOPATH

## (undated) DEVICE — UNDYED BRAIDED (POLYGLACTIN 910), SYNTHETIC ABSORBABLE SUTURE: Brand: COATED VICRYL

## (undated) DEVICE — GIJAW SINGLE-USE BIOPSY FORCEPS WITH NEEDLE: Brand: GIJAW

## (undated) DEVICE — TROCAR: Brand: KII SHIELDED BLADED ACCESS SYSTEM

## (undated) DEVICE — 1200CC GUARDIAN II: Brand: GUARDIAN

## (undated) DEVICE — KIT CUSTOM ENDOPROCEDURE STERIS

## (undated) DEVICE — FILTERLINE NASAL ADULT O2/CO2

## (undated) DEVICE — 40580 - THE PINK PAD - ADVANCED TRENDELENBURG POSITIONING KIT: Brand: 40580 - THE PINK PAD - ADVANCED TRENDELENBURG POSITIONING KIT

## (undated) DEVICE — ENDOSCOPE CAP SB3 PILLCAM

## (undated) DEVICE — PTFE COATED BLADE 2.75': Brand: MEDLINE

## (undated) DEVICE — SUT COAT VCRL+ 0 27IN UR-6 ABSRB VLT ANTIBACT

## (undated) DEVICE — BITEBLOCK ENDOSCP 60FR MAXI STRP

## (undated) DEVICE — ENDOSCOPY PACK UPPER: Brand: MEDLINE INDUSTRIES, INC.

## (undated) DEVICE — 3M™ RED DOT™ MONITORING ELECTRODE WITH FOAM TAPE AND STICKY GEL, 50/BAG, 20/CASE, 72/PLT 2570: Brand: RED DOT™

## (undated) DEVICE — KIT VLV 5 PC AIR H2O SUCT BX ENDOGATOR CONN

## (undated) DEVICE — ARTICULATION RELOAD WITH TRI-STAPLE TECHNOLOGY: Brand: ENDO GIA

## (undated) DEVICE — LAP CHOLE/APPY CDS-LF: Brand: MEDLINE INDUSTRIES, INC.

## (undated) DEVICE — Device: Brand: DEFENDO AIR/WATER/SUCTION AND BIOPSY VALVE

## (undated) DEVICE — 10FT COMBINED O2 DELIVERY/CO2 MONITORING. FILTER WITH MICROSTREAM TYPE LUER: Brand: DUAL ADULT NASAL CANNULA

## (undated) DEVICE — GLOVE SUR 6.5 SENSICARE PI PIP CRM PWD F

## (undated) DEVICE — POWER SHELL: Brand: SIGNIA

## (undated) DEVICE — TRAY CATH 16FR F INCL BARDX IC COMPLT CARE

## (undated) DEVICE — TRADITIONAL MARYLAND DISSECTOR TIP, DISPOSABLE: Brand: RENEW

## (undated) DEVICE — Device: Brand: SUTURE PASSOR PRO

## (undated) DEVICE — SOLUTION IRRIG 1000ML 0.9% NACL USP BTL

## (undated) DEVICE — GRABBER GRASPER TIP, DISPOSABLE: Brand: RENEW

## (undated) DEVICE — MUCUS TRAP, 10FR, DELEE, W/VA: Brand: MEDLINE INDUSTRIES, INC.

## (undated) DEVICE — NEPTUNE E-SEP SMOKE EVACUATION PENCIL, COATED, 70MM BLADE, PUSH BUTTON SWITCH: Brand: NEPTUNE E-SEP

## (undated) DEVICE — DALE ABDOMINAL BINDER, 12" WIDE, STRETCHES TO FIT 30"-45", 1 PER BOX.: Brand: DALE ABDOMINAL BINDER

## (undated) DEVICE — MEDI-VAC SUCTION HANDLE REGULAR CAPACITY: Brand: CARDINAL HEALTH

## (undated) DEVICE — LASSO POLYPECTOMY SNARE: Brand: LASSO

## (undated) DEVICE — L-HOOK CAUTERY PROBE TIP, DISPOSABLE: Brand: RENEW

## (undated) DEVICE — HUNTER GASPER TIP, DISPOSABLE: Brand: RENEW

## (undated) DEVICE — COVER,LIGHT,CAMERA,HARD,1/PK,STRL: Brand: MEDLINE

## (undated) DEVICE — BANDAGE ADH 1INX3IN NAT FAB N ADH PD CURAD

## (undated) NOTE — Clinical Note
[address]  [address]  Authorization for Surgical Operation and Procedure     Date:___________                                                                                                         Time:__________  1. I hereby authorize Surgeon(s):  Geoffrey Milan MD, my physician and his/her assistants (if applicable), which may include medical students, residents, and/or fellows, to perform the following surgical operation/ procedure and administer such anesthesia as may be determined necessary by my physician:  Operation/Procedure name (s) Procedure(s):  ESOPHAGOGASTRODUODENOSCOPY (EGD) on Moises Olsen   2.   I recognize that during the surgical operation/procedure, unforeseen conditions may necessitate additional or different procedures than those listed above.  I, therefore, further authorize and request that the above-named surgeon, assistants, or designees perform such procedures as are, in their judgment, necessary and desirable.    3.   My surgeon/physician has discussed prior to my surgery the potential benefits, risks and side effects of this procedure; the likelihood of achieving goals; and potential problems that might occur during recuperation.  They also discussed reasonable alternatives to the procedure, including risks, benefits, and side effects related to the alternatives and risks related to not receiving this procedure.  I have had all my questions answered and I acknowledge that no guarantee has been made as to the result that may be obtained.    4.   Should the need arise during my operation/procedure, which includes change of level of care prior to discharge, I also consent to the administration of blood and/or blood products.  Further, I understand that despite careful testing and screening of blood or blood products by collecting agencies, I may still be subject to ill effects as a result of receiving a blood transfusion and/or blood products.  The  following are some, but not all, of the potential risks that can occur: fever and allergic reactions, hemolytic reactions, transmission of diseases such as Hepatitis, AIDS and Cytomegalovirus (CMV) and fluid overload.  In the event that I wish to have an autologous transfusion of my own blood, or a directed donor transfusion, I will discuss this with my physician.  Check only if Refusing Blood or Blood Products  I understand refusal of blood or blood products as deemed necessary by my physician may have serious consequences to my condition to include possible death. I hereby assume responsibility for my refusal and release the hospital, its personnel, and my physicians from any responsibility for the consequences of my refusal.          o  Refuse      5.   I authorize the use of any specimen, organs, tissues, body parts or foreign objects that may be removed from my body during the operation/procedure for diagnosis, research or teaching purposes and their subsequent disposal by hospital authorities.  I also authorize the release of specimen test results and/or written reports to my treating physician on the hospital medical staff or other referring or consulting physicians involved in my care, at the discretion of the Pathologist or my treating physician.    6.   I consent to the photographing or videotaping of the operations or procedures to be performed, including appropriate portions of my body for medical, scientific, or educational purposes, provided my identity is not revealed by the pictures or by descriptive texts accompanying them.  If the procedure has been photographed/videotaped, the surgeon will obtain the original picture, image, videotape or CD.  The hospital will not be responsible for storage, release or maintenance of the picture, image, tape or CD.    7.   I consent to the presence of a  or observers in the operating room as deemed necessary by my physician or their designees.     8.   I recognize that in the event my procedure results in extended X-Ray/fluoroscopy time, I may develop a skin reaction.    9. If I have a Do Not Attempt Resuscitation (DNAR) order in place, that status will be suspended while in the operating room, procedural suite, and during the recovery period unless otherwise explicitly stated by me (or a person authorized to consent on my behalf). The surgeon or my attending physician will determine when the applicable recovery period ends for purposes of reinstating the DNAR order.  10. Patients having a sterilization procedure: I understand that if the procedure is successful the results will be permanent and it will therefore be impossible for me to inseminate, conceive, or bear children.  I also understand that the procedure is intended to result in sterility, although the result has not been guaranteed.   11. I acknowledge that my physician has explained sedation/analgesia administration to me including the risk and benefits I consent to the administration of sedation/analgesia as may be necessary or desirable in the judgment of my physician.    I CERTIFY THAT I HAVE READ AND FULLY UNDERSTAND THE ABOVE CONSENT TO OPERATION and/or OTHER PROCEDURE.    _________________________________________  __________________________________  Signature of Patient     Signature of Responsible Person         ___________________________________         Printed Name of Responsible Person           _________________________________                 Relationship to Patient  _________________________________________  ______________________________  Signature of Witness          Date  Time      Patient Name: Moises Olsen     : 7/15/1960                 Printed: 2025     Medical Record #: [id number]                     Page 1 [address]  [address]    Consent for Anesthesia    1. IMoises agree to be  cared for by an anesthesiologist, who is specially trained to monitor me and give me medicine to put me to sleep or keep me comfortable during my procedure    I understand that my anesthesiologist is not an employee or agent of [facility] or [employer]. He or she works for [employer].    2. As the patient asking for anesthesia services, I agree to:  a. Allow the anesthesiologist (anesthesia doctor) to give me medicine and do additional procedures as necessary. Some examples are: Starting or using an “IV” to give me medicine, fluids or blood during my procedure, and having a breathing tube placed to help me breathe when I’m asleep (intubation). In the event that my heart stops working properly, I understand that my anesthesiologist will make every effort to sustain my life, unless otherwise directed by [facility] Do Not Resuscitate documents.  b. Tell my anesthesia doctor before my procedure:  i. If I am pregnant.  ii. The last time that I ate or drank.  iii. All of the medicines I take (including prescriptions, herbal supplements, and pills I can buy without a prescription (including street drugs/illegal medications). Failure to inform my anesthesiologist about these medicines may increase my risk of anesthetic complications.  iv. If I am allergic to anything or have had a reaction to anesthesia before.  3. I understand how the anesthesia medicine will help me (benefits).  4. I understand that with any type of anesthesia medicine there are risks:  a. The most common risks are: nausea, vomiting, sore throat, muscle soreness, damage to my eyes, mouth, or teeth (from breathing tube placement).  b. Rare risks include: remembering what happened during my procedure, allergic reactions to medications, injury to my airway, heart, lungs, vision, nerves, or muscles and in extremely rare instances death.  5. My doctor has explained to me other choices available to me for my care  (alternatives).  6. Pregnant Patients (“epidural”):  I understand that the risks of having an epidural (medicine given into my back to help control pain during labor), include itching, low blood pressure, difficulty urinating, headache or slowing of the baby’s heart. Very rare risks include infection, bleeding, seizure, irregular heart rhythms and nerve injury.  7. Regional Anesthesia (“spinal”, “epidural”, & “nerve blocks”):  I understand that rare but potential complications include headache, bleeding, infection, seizure, irregular heart rhythms, and nerve injury.    I can change my mind about having anesthesia services at any time before I get the medicine.    _____________________________________________________________________________  Patient (or Representative) Signature/Relationship to Patient  Date   Time    _____________________________________________________________________________   Name (if used)    Language/Organization   Time    _____________________________________________________________________________  Anesthesiologist Signature     Date   Time  I have discussed the procedure and information above with the patient (or patient’s representative) and answered their questions. The patient or their representative has agreed to have anesthesia services.    _____________________________________________________________________________  Witness        Date   Time  I have verified that the signature is that of the patient or patient’s representative, and that it was signed before the procedure  Patient Name: Moises Olsen     : 7/15/1960                 Printed: 2025     Medical Record #: [id number]                     Page 2 of 2

## (undated) NOTE — LETTER
[address]  [address]  Authorization for Surgical Operation and Procedure     Date:___________                                                                                                         Time:__________  I hereby authorize Surgeon(s):  Jacques Mack MD, my physician and his/her assistants (if applicable), which may include medical students, residents, and/or fellows, to perform the following surgical operation/ procedure and administer such anesthesia as may be determined necessary by my physician:  Operation/Procedure name (s) Procedure(s):  CAPSULE ENDOSCOPY, ESOPHAGOGASTRODUODENOSCOPY  ESOPHAGOGASTRODUODENOSCOPY (EGD) on Moises Olsen   2.   I recognize that during the surgical operation/procedure, unforeseen conditions may necessitate additional or different procedures than those listed above.  I, therefore, further authorize and request that the above-named surgeon, assistants, or designees perform such procedures as are, in their judgment, necessary and desirable.    3.   My surgeon/physician has discussed prior to my surgery the potential benefits, risks and side effects of this procedure; the likelihood of achieving goals; and potential problems that might occur during recuperation.  They also discussed reasonable alternatives to the procedure, including risks, benefits, and side effects related to the alternatives and risks related to not receiving this procedure.  I have had all my questions answered and I acknowledge that no guarantee has been made as to the result that may be obtained.    4.   Should the need arise during my operation/procedure, which includes change of level of care prior to discharge, I also consent to the administration of blood and/or blood products.  Further, I understand that despite careful testing and screening of blood or blood products by collecting agencies, I may still be subject to ill effects as a result of receiving a  blood transfusion and/or blood products.  The following are some, but not all, of the potential risks that can occur: fever and allergic reactions, hemolytic reactions, transmission of diseases such as Hepatitis, AIDS and Cytomegalovirus (CMV) and fluid overload.  In the event that I wish to have an autologous transfusion of my own blood, or a directed donor transfusion, I will discuss this with my physician.  Check only if Refusing Blood or Blood Products  I understand refusal of blood or blood products as deemed necessary by my physician may have serious consequences to my condition to include possible death. I hereby assume responsibility for my refusal and release the hospital, its personnel, and my physicians from any responsibility for the consequences of my refusal.          o  Refuse      5.   I authorize the use of any specimen, organs, tissues, body parts or foreign objects that may be removed from my body during the operation/procedure for diagnosis, research or teaching purposes and their subsequent disposal by hospital authorities.  I also authorize the release of specimen test results and/or written reports to my treating physician on the hospital medical staff or other referring or consulting physicians involved in my care, at the discretion of the Pathologist or my treating physician.    6.   I consent to the photographing or videotaping of the operations or procedures to be performed, including appropriate portions of my body for medical, scientific, or educational purposes, provided my identity is not revealed by the pictures or by descriptive texts accompanying them.  If the procedure has been photographed/videotaped, the surgeon will obtain the original picture, image, videotape or CD.  The hospital will not be responsible for storage, release or maintenance of the picture, image, tape or CD.    7.   I consent to the presence of a  or observers in the operating room as deemed  necessary by my physician or their designees.    8.   I recognize that in the event my procedure results in extended X-Ray/fluoroscopy time, I may develop a skin reaction.    9. If I have a Do Not Attempt Resuscitation (DNAR) order in place, that status will be suspended while in the operating room, procedural suite, and during the recovery period unless otherwise explicitly stated by me (or a person authorized to consent on my behalf). The surgeon or my attending physician will determine when the applicable recovery period ends for purposes of reinstating the DNAR order.  10. Patients having a sterilization procedure: I understand that if the procedure is successful the results will be permanent and it will therefore be impossible for me to inseminate, conceive, or bear children.  I also understand that the procedure is intended to result in sterility, although the result has not been guaranteed.   11. I acknowledge that my physician has explained sedation/analgesia administration to me including the risk and benefits I consent to the administration of sedation/analgesia as may be necessary or desirable in the judgment of my physician.    I CERTIFY THAT I HAVE READ AND FULLY UNDERSTAND THE ABOVE CONSENT TO OPERATION and/or OTHER PROCEDURE.    _________________________________________  __________________________________  Signature of Patient     Signature of Responsible Person         ___________________________________         Printed Name of Responsible Person           _________________________________                 Relationship to Patient  _________________________________________  ______________________________  Signature of Witness          Date  Time      Patient Name: Moises Olsen     : 7/15/1960                 Printed: 2025     Medical Record #: [id number]                     Page 1 of 2                                    [address]  [address]    Consent  for Anesthesia    I, Moises Olsne agree to be cared for by an anesthesiologist, who is specially trained to monitor me and give me medicine to put me to sleep or keep me comfortable during my procedure    I understand that my anesthesiologist is not an employee or agent of [facility] or [employer]. He or she works for [employer].    As the patient asking for anesthesia services, I agree to:  Allow the anesthesiologist (anesthesia doctor) to give me medicine and do additional procedures as necessary. Some examples are: Starting or using an “IV” to give me medicine, fluids or blood during my procedure, and having a breathing tube placed to help me breathe when I’m asleep (intubation). In the event that my heart stops working properly, I understand that my anesthesiologist will make every effort to sustain my life, unless otherwise directed by [facility] Do Not Resuscitate documents.  Tell my anesthesia doctor before my procedure:  If I am pregnant.  The last time that I ate or drank.  All of the medicines I take (including prescriptions, herbal supplements, and pills I can buy without a prescription (including street drugs/illegal medications). Failure to inform my anesthesiologist about these medicines may increase my risk of anesthetic complications.  If I am allergic to anything or have had a reaction to anesthesia before.  I understand how the anesthesia medicine will help me (benefits).  I understand that with any type of anesthesia medicine there are risks:  The most common risks are: nausea, vomiting, sore throat, muscle soreness, damage to my eyes, mouth, or teeth (from breathing tube placement).  Rare risks include: remembering what happened during my procedure, allergic reactions to medications, injury to my airway, heart, lungs, vision, nerves, or muscles and in extremely rare instances death.  My doctor has explained to me other choices available to me for my care  (alternatives).  Pregnant Patients (“epidural”):  I understand that the risks of having an epidural (medicine given into my back to help control pain during labor), include itching, low blood pressure, difficulty urinating, headache or slowing of the baby’s heart. Very rare risks include infection, bleeding, seizure, irregular heart rhythms and nerve injury.  Regional Anesthesia (“spinal”, “epidural”, & “nerve blocks”):  I understand that rare but potential complications include headache, bleeding, infection, seizure, irregular heart rhythms, and nerve injury.    I can change my mind about having anesthesia services at any time before I get the medicine.    _____________________________________________________________________________  Patient (or Representative) Signature/Relationship to Patient  Date   Time    _____________________________________________________________________________   Name (if used)    Language/Organization   Time    _____________________________________________________________________________  Anesthesiologist Signature     Date   Time  I have discussed the procedure and information above with the patient (or patient’s representative) and answered their questions. The patient or their representative has agreed to have anesthesia services.    _____________________________________________________________________________  Witness        Date   Time  I have verified that the signature is that of the patient or patient’s representative, and that it was signed before the procedure  Patient Name: Moises Olsen     : 7/15/1960                 Printed: 2025     Medical Record #: [id number]                     Page 2 of 2

## (undated) NOTE — LETTER
12/17/24    Moises Olsen      To Whom It May Concern:    This letter has been written at the patient's request. The above patient was seen at [facility] for treatment of a medical condition from 12/15/2024 - 12/17/2024    The patient may return to work on 12/19/2024.      Sincerely,        Fly Bingham, DO  12/17/24, 11:09 AM

## (undated) NOTE — Clinical Note
[address]  [address]  Authorization for Surgical Operation and Procedure     Date:___________                                                                                                         Time:__________  1. I hereby authorize Surgeon(s):  Geoffrey Milan MD, my physician and his/her assistants (if applicable), which may include medical students, residents, and/or fellows, to perform the following surgical operation/ procedure and administer such anesthesia as may be determined necessary by my physician:  Operation/Procedure name (s) Procedure(s):  ESOPHAGOGASTRODUODENOSCOPY (EGD) on Moises Olsen   2.   I recognize that during the surgical operation/procedure, unforeseen conditions may necessitate additional or different procedures than those listed above.  I, therefore, further authorize and request that the above-named surgeon, assistants, or designees perform such procedures as are, in their judgment, necessary and desirable.    3.   My surgeon/physician has discussed prior to my surgery the potential benefits, risks and side effects of this procedure; the likelihood of achieving goals; and potential problems that might occur during recuperation.  They also discussed reasonable alternatives to the procedure, including risks, benefits, and side effects related to the alternatives and risks related to not receiving this procedure.  I have had all my questions answered and I acknowledge that no guarantee has been made as to the result that may be obtained.    4.   Should the need arise during my operation/procedure, which includes change of level of care prior to discharge, I also consent to the administration of blood and/or blood products.  Further, I understand that despite careful testing and screening of blood or blood products by collecting agencies, I may still be subject to ill effects as a result of receiving a blood transfusion and/or blood products.  The  following are some, but not all, of the potential risks that can occur: fever and allergic reactions, hemolytic reactions, transmission of diseases such as Hepatitis, AIDS and Cytomegalovirus (CMV) and fluid overload.  In the event that I wish to have an autologous transfusion of my own blood, or a directed donor transfusion, I will discuss this with my physician.  Check only if Refusing Blood or Blood Products  I understand refusal of blood or blood products as deemed necessary by my physician may have serious consequences to my condition to include possible death. I hereby assume responsibility for my refusal and release the hospital, its personnel, and my physicians from any responsibility for the consequences of my refusal.          o  Refuse      5.   I authorize the use of any specimen, organs, tissues, body parts or foreign objects that may be removed from my body during the operation/procedure for diagnosis, research or teaching purposes and their subsequent disposal by hospital authorities.  I also authorize the release of specimen test results and/or written reports to my treating physician on the hospital medical staff or other referring or consulting physicians involved in my care, at the discretion of the Pathologist or my treating physician.    6.   I consent to the photographing or videotaping of the operations or procedures to be performed, including appropriate portions of my body for medical, scientific, or educational purposes, provided my identity is not revealed by the pictures or by descriptive texts accompanying them.  If the procedure has been photographed/videotaped, the surgeon will obtain the original picture, image, videotape or CD.  The hospital will not be responsible for storage, release or maintenance of the picture, image, tape or CD.    7.   I consent to the presence of a  or observers in the operating room as deemed necessary by my physician or their designees.     8.   I recognize that in the event my procedure results in extended X-Ray/fluoroscopy time, I may develop a skin reaction.    9. If I have a Do Not Attempt Resuscitation (DNAR) order in place, that status will be suspended while in the operating room, procedural suite, and during the recovery period unless otherwise explicitly stated by me (or a person authorized to consent on my behalf). The surgeon or my attending physician will determine when the applicable recovery period ends for purposes of reinstating the DNAR order.  10. Patients having a sterilization procedure: I understand that if the procedure is successful the results will be permanent and it will therefore be impossible for me to inseminate, conceive, or bear children.  I also understand that the procedure is intended to result in sterility, although the result has not been guaranteed.   11. I acknowledge that my physician has explained sedation/analgesia administration to me including the risk and benefits I consent to the administration of sedation/analgesia as may be necessary or desirable in the judgment of my physician.    I CERTIFY THAT I HAVE READ AND FULLY UNDERSTAND THE ABOVE CONSENT TO OPERATION and/or OTHER PROCEDURE.    _________________________________________  __________________________________  Signature of Patient     Signature of Responsible Person         ___________________________________         Printed Name of Responsible Person           _________________________________                 Relationship to Patient  _________________________________________  ______________________________  Signature of Witness          Date  Time      Patient Name: Moises Olsen     : 7/15/1960                 Printed: 2025     Medical Record #: [id number]                     Page 1 [address]  [address]    Consent for Anesthesia    1. IMoises agree to be  cared for by an anesthesiologist, who is specially trained to monitor me and give me medicine to put me to sleep or keep me comfortable during my procedure    I understand that my anesthesiologist is not an employee or agent of [facility] or [employer]. He or she works for [employer].    2. As the patient asking for anesthesia services, I agree to:  a. Allow the anesthesiologist (anesthesia doctor) to give me medicine and do additional procedures as necessary. Some examples are: Starting or using an “IV” to give me medicine, fluids or blood during my procedure, and having a breathing tube placed to help me breathe when I’m asleep (intubation). In the event that my heart stops working properly, I understand that my anesthesiologist will make every effort to sustain my life, unless otherwise directed by [facility] Do Not Resuscitate documents.  b. Tell my anesthesia doctor before my procedure:  i. If I am pregnant.  ii. The last time that I ate or drank.  iii. All of the medicines I take (including prescriptions, herbal supplements, and pills I can buy without a prescription (including street drugs/illegal medications). Failure to inform my anesthesiologist about these medicines may increase my risk of anesthetic complications.  iv. If I am allergic to anything or have had a reaction to anesthesia before.  3. I understand how the anesthesia medicine will help me (benefits).  4. I understand that with any type of anesthesia medicine there are risks:  a. The most common risks are: nausea, vomiting, sore throat, muscle soreness, damage to my eyes, mouth, or teeth (from breathing tube placement).  b. Rare risks include: remembering what happened during my procedure, allergic reactions to medications, injury to my airway, heart, lungs, vision, nerves, or muscles and in extremely rare instances death.  5. My doctor has explained to me other choices available to me for my care  (alternatives).  6. Pregnant Patients (“epidural”):  I understand that the risks of having an epidural (medicine given into my back to help control pain during labor), include itching, low blood pressure, difficulty urinating, headache or slowing of the baby’s heart. Very rare risks include infection, bleeding, seizure, irregular heart rhythms and nerve injury.  7. Regional Anesthesia (“spinal”, “epidural”, & “nerve blocks”):  I understand that rare but potential complications include headache, bleeding, infection, seizure, irregular heart rhythms, and nerve injury.    I can change my mind about having anesthesia services at any time before I get the medicine.    _____________________________________________________________________________  Patient (or Representative) Signature/Relationship to Patient  Date   Time    _____________________________________________________________________________   Name (if used)    Language/Organization   Time    _____________________________________________________________________________  Anesthesiologist Signature     Date   Time  I have discussed the procedure and information above with the patient (or patient’s representative) and answered their questions. The patient or their representative has agreed to have anesthesia services.    _____________________________________________________________________________  Witness        Date   Time  I have verified that the signature is that of the patient or patient’s representative, and that it was signed before the procedure  Patient Name: Moises Olsen     : 7/15/1960                 Printed: 2025     Medical Record #: [id number]                     Page 2 of 2

## (undated) NOTE — LETTER
January 22, 2025    Patient: Moises Olsen   Date of Visit: 1/20/2025       [address] [address]  ?  01/22/2025  ?  Re: Moises Olsen  ?  To Whom It May Concern:     Moises Olsen was admitted to [facility] from 1/20/2025 to 01/22/2025.     Please excuse Moises Olsen from attending work for these days.     The patient may return to work on Sunday, January 26, 2025.     Patient will follow up with their primary care physician upon discharge.      Further restrictions and work excuse will be based on primary care physician's evaluation.  ?  Thank you,     Familia Arias MD, MD  [facility]                 If you have any questions or concerns, please don't hesitate to call.       Encounter Provider(s):    Kateryna Pulido DO Olaru, Oana, MD Tailor, Pranav, MD

## (undated) NOTE — LETTER
[facility] [place]      In the course of your treatment, it may become necessary to administer a transfusion of blood or blood components.  This form provides basic information concerning this proc explain the alternatives to you if it has not already been done. I,Moises Olsen, have read/had read to me the above. I understand the matters bearing on the decision whether or not to authorize a transfusion of blood or blood components.  I have no question

## (undated) NOTE — LETTER
[address]  [address]  Authorization for Surgical Operation and Procedure     Date:___________                                                                                                         Time:__________  I hereby authorize KAREN FAITH MD my physician and his/her assistants (if applicable), which may include medical students, residents, and/or fellows, to perform the following surgical operation/ procedure and administer such anesthesia as may be determined necessary by my physician:  Operation/Procedure name (s) LAPAROSCOPIC APPENDECTOMY POSSIBLE OPEN on Moises Olsen   2.   I recognize that during the surgical operation/procedure, unforeseen conditions may necessitate additional or different procedures than those listed above.  I, therefore, further authorize and request that the above-named surgeon, assistants, or designees perform such procedures as are, in their judgment, necessary and desirable.    3.   My surgeon/physician has discussed prior to my surgery the potential benefits, risks and side effects of this procedure; the likelihood of achieving goals; and potential problems that might occur during recuperation.  They also discussed reasonable alternatives to the procedure, including risks, benefits, and side effects related to the alternatives and risks related to not receiving this procedure.  I have had all my questions answered and I acknowledge that no guarantee has been made as to the result that may be obtained.    4.   Should the need arise during my operation/procedure, which includes change of level of care prior to discharge, I also consent to the administration of blood and/or blood products.  Further, I understand that despite careful testing and screening of blood or blood products by collecting agencies, I may still be subject to ill effects as a result of receiving a blood transfusion and/or blood products.  The following are some, but  not all, of the potential risks that can occur: fever and allergic reactions, hemolytic reactions, transmission of diseases such as Hepatitis, AIDS and Cytomegalovirus (CMV) and fluid overload.  In the event that I wish to have an autologous transfusion of my own blood, or a directed donor transfusion, I will discuss this with my physician.  Check only if Refusing Blood or Blood Products  I understand refusal of blood or blood products as deemed necessary by my physician may have serious consequences to my condition to include possible death. I hereby assume responsibility for my refusal and release the hospital, its personnel, and my physicians from any responsibility for the consequences of my refusal.          o  Refuse      5.   I authorize the use of any specimen, organs, tissues, body parts or foreign objects that may be removed from my body during the operation/procedure for diagnosis, research or teaching purposes and their subsequent disposal by hospital authorities.  I also authorize the release of specimen test results and/or written reports to my treating physician on the hospital medical staff or other referring or consulting physicians involved in my care, at the discretion of the Pathologist or my treating physician.    6.   I consent to the photographing or videotaping of the operations or procedures to be performed, including appropriate portions of my body for medical, scientific, or educational purposes, provided my identity is not revealed by the pictures or by descriptive texts accompanying them.  If the procedure has been photographed/videotaped, the surgeon will obtain the original picture, image, videotape or CD.  The hospital will not be responsible for storage, release or maintenance of the picture, image, tape or CD.    7.   I consent to the presence of a  or observers in the operating room as deemed necessary by my physician or their designees.    8.   I recognize that  in the event my procedure results in extended X-Ray/fluoroscopy time, I may develop a skin reaction.    9. If I have a Do Not Attempt Resuscitation (DNAR) order in place, that status will be suspended while in the operating room, procedural suite, and during the recovery period unless otherwise explicitly stated by me (or a person authorized to consent on my behalf). The surgeon or my attending physician will determine when the applicable recovery period ends for purposes of reinstating the DNAR order.  10. Patients having a sterilization procedure: I understand that if the procedure is successful the results will be permanent and it will therefore be impossible for me to inseminate, conceive, or bear children.  I also understand that the procedure is intended to result in sterility, although the result has not been guaranteed.   11. I acknowledge that my physician has explained sedation/analgesia administration to me including the risk and benefits I consent to the administration of sedation/analgesia as may be necessary or desirable in the judgment of my physician.    I CERTIFY THAT I HAVE READ AND FULLY UNDERSTAND THE ABOVE CONSENT TO OPERATION and/or OTHER PROCEDURE.    _________________________________________  __________________________________  Signature of Patient     Signature of Responsible Person         ___________________________________         Printed Name of Responsible Person           _________________________________                 Relationship to Patient  _________________________________________  ______________________________  Signature of Witness          Date  Time      Patient Name: Moises Olsen     : 7/15/1960                 Printed: 2025     Medical Record #: [id number]                     Page 1 of [address] [place] [address]  [id number]    Consent for Anesthesia    I, Moises Olsen agree to be cared for by an  anesthesiologist, who is specially trained to monitor me and give me medicine to put me to sleep or keep me comfortable during my procedure    I understand that my anesthesiologist is not an employee or agent of [facility] or [employer]. He or she works for [email or web address].    As the patient asking for anesthesia services, I agree to:  Allow the anesthesiologist (anesthesia doctor) to give me medicine and do additional procedures as necessary. Some examples are: Starting or using an “IV” to give me medicine, fluids or blood during my procedure, and having a breathing tube placed to help me breathe when I’m asleep (intubation). In the event that my heart stops working properly, I understand that my anesthesiologist will make every effort to sustain my life, unless otherwise directed by [facility] Do Not Resuscitate documents.  Tell my anesthesia doctor before my procedure:  If I am pregnant.  The last time that I ate or drank.  All of the medicines I take (including prescriptions, herbal supplements, and pills I can buy without a prescription (including street drugs/illegal medications). Failure to inform my anesthesiologist about these medicines may increase my risk of anesthetic complications.  If I am allergic to anything or have had a reaction to anesthesia before.  I understand how the anesthesia medicine will help me (benefits).  I understand that with any type of anesthesia medicine there are risks:  The most common risks are: nausea, vomiting, sore throat, muscle soreness, damage to my eyes, mouth, or teeth (from breathing tube placement).  Rare risks include: remembering what happened during my procedure, allergic reactions to medications, injury to my airway, heart, lungs, vision, nerves, or muscles and in extremely rare instances death.  My doctor has explained to me other choices available to me for my care (alternatives).  Pregnant Patients (“epidural”):  I understand  that the risks of having an epidural (medicine given into my back to help control pain during labor), include itching, low blood pressure, difficulty urinating, headache or slowing of the baby’s heart. Very rare risks include infection, bleeding, seizure, irregular heart rhythms and nerve injury.  Regional Anesthesia (“spinal”, “epidural”, & “nerve blocks”):  I understand that rare but potential complications include headache, bleeding, infection, seizure, irregular heart rhythms, and nerve injury.    I can change my mind about having anesthesia services at any time before I get the medicine.    _____________________________________________________________________________  Patient (or Representative) Signature/Relationship to Patient  Date   Time    _____________________________________________________________________________   Name (if used)    Language/Organization   Time    _____________________________________________________________________________  Anesthesiologist Signature     Date   Time  I have discussed the procedure and information above with the patient (or patient’s representative) and answered their questions. The patient or their representative has agreed to have anesthesia services.    _____________________________________________________________________________  Witness        Date   Time  I have verified that the signature is that of the patient or patient’s representative, and that it was signed before the procedure  Patient Name: Moises Olsen     : 7/15/1960                 Printed: 2025     Medical Record #: [id number]                     Page 2 of 2

## (undated) NOTE — LETTER
[address]  [address]  Authorization for Surgical Operation and Procedure     Date:___________                                                                                                         Time:__________  I hereby authorize Surgeon(s):  Charlie Dela Cruz MD, my physician and his/her assistants (if applicable), which may include medical students, residents, and/or fellows, to perform the following surgical operation/ procedure and administer such anesthesia as may be determined necessary by my physician:  Operation/Procedure name (s) Procedure(s):  LAPAROSCOPIC APPENDECTOMY POSSIBLE OPEN on Moises Olsen   2.   I recognize that during the surgical operation/procedure, unforeseen conditions may necessitate additional or different procedures than those listed above.  I, therefore, further authorize and request that the above-named surgeon, assistants, or designees perform such procedures as are, in their judgment, necessary and desirable.    3.   My surgeon/physician has discussed prior to my surgery the potential benefits, risks and side effects of this procedure; the likelihood of achieving goals; and potential problems that might occur during recuperation.  They also discussed reasonable alternatives to the procedure, including risks, benefits, and side effects related to the alternatives and risks related to not receiving this procedure.  I have had all my questions answered and I acknowledge that no guarantee has been made as to the result that may be obtained.    4.   Should the need arise during my operation/procedure, which includes change of level of care prior to discharge, I also consent to the administration of blood and/or blood products.  Further, I understand that despite careful testing and screening of blood or blood products by collecting agencies, I may still be subject to ill effects as a result of receiving a blood transfusion and/or blood products.   The following are some, but not all, of the potential risks that can occur: fever and allergic reactions, hemolytic reactions, transmission of diseases such as Hepatitis, AIDS and Cytomegalovirus (CMV) and fluid overload.  In the event that I wish to have an autologous transfusion of my own blood, or a directed donor transfusion, I will discuss this with my physician.  Check only if Refusing Blood or Blood Products  I understand refusal of blood or blood products as deemed necessary by my physician may have serious consequences to my condition to include possible death. I hereby assume responsibility for my refusal and release the hospital, its personnel, and my physicians from any responsibility for the consequences of my refusal.          o  Refuse      5.   I authorize the use of any specimen, organs, tissues, body parts or foreign objects that may be removed from my body during the operation/procedure for diagnosis, research or teaching purposes and their subsequent disposal by hospital authorities.  I also authorize the release of specimen test results and/or written reports to my treating physician on the hospital medical staff or other referring or consulting physicians involved in my care, at the discretion of the Pathologist or my treating physician.    6.   I consent to the photographing or videotaping of the operations or procedures to be performed, including appropriate portions of my body for medical, scientific, or educational purposes, provided my identity is not revealed by the pictures or by descriptive texts accompanying them.  If the procedure has been photographed/videotaped, the surgeon will obtain the original picture, image, videotape or CD.  The hospital will not be responsible for storage, release or maintenance of the picture, image, tape or CD.    7.   I consent to the presence of a  or observers in the operating room as deemed necessary by my physician or their  designees.    8.   I recognize that in the event my procedure results in extended X-Ray/fluoroscopy time, I may develop a skin reaction.    9. If I have a Do Not Attempt Resuscitation (DNAR) order in place, that status will be suspended while in the operating room, procedural suite, and during the recovery period unless otherwise explicitly stated by me (or a person authorized to consent on my behalf). The surgeon or my attending physician will determine when the applicable recovery period ends for purposes of reinstating the DNAR order.  10. Patients having a sterilization procedure: I understand that if the procedure is successful the results will be permanent and it will therefore be impossible for me to inseminate, conceive, or bear children.  I also understand that the procedure is intended to result in sterility, although the result has not been guaranteed.   11. I acknowledge that my physician has explained sedation/analgesia administration to me including the risk and benefits I consent to the administration of sedation/analgesia as may be necessary or desirable in the judgment of my physician.    I CERTIFY THAT I HAVE READ AND FULLY UNDERSTAND THE ABOVE CONSENT TO OPERATION and/or OTHER PROCEDURE.    _________________________________________  __________________________________  Signature of Patient     Signature of Responsible Person         ___________________________________         Printed Name of Responsible Person           _________________________________                 Relationship to Patient  _________________________________________  ______________________________  Signature of Witness          Date  Time      Patient Name: Moises Olsen     : 7/15/1960                 Printed: 2025     Medical Record #: [id number]                     Page 2 of [address] [place] [address] [place]  [address]    Consent for Anesthesia    I, Moises Olsen  agree to be cared for by an anesthesiologist, who is specially trained to monitor me and give me medicine to put me to sleep or keep me comfortable during my procedure    I understand that my anesthesiologist is not an employee or agent of [facility] or [employer]. He or she works for [employer].    As the patient asking for anesthesia services, I agree to:  Allow the anesthesiologist (anesthesia doctor) to give me medicine and do additional procedures as necessary. Some examples are: Starting or using an “IV” to give me medicine, fluids or blood during my procedure, and having a breathing tube placed to help me breathe when I’m asleep (intubation). In the event that my heart stops working properly, I understand that my anesthesiologist will make every effort to sustain my life, unless otherwise directed by [facility] Do Not Resuscitate documents.  Tell my anesthesia doctor before my procedure:  If I am pregnant.  The last time that I ate or drank.  All of the medicines I take (including prescriptions, herbal supplements, and pills I can buy without a prescription (including street drugs/illegal medications). Failure to inform my anesthesiologist about these medicines may increase my risk of anesthetic complications.  If I am allergic to anything or have had a reaction to anesthesia before.  I understand how the anesthesia medicine will help me (benefits).  I understand that with any type of anesthesia medicine there are risks:  The most common risks are: nausea, vomiting, sore throat, muscle soreness, damage to my eyes, mouth, or teeth (from breathing tube placement).  Rare risks include: remembering what happened during my procedure, allergic reactions to medications, injury to my airway, heart, lungs, vision, nerves, or muscles and in extremely rare instances death.  My doctor has explained to me other choices available to me for my care (alternatives).  Pregnant Patients  (“epidural”):  I understand that the risks of having an epidural (medicine given into my back to help control pain during labor), include itching, low blood pressure, difficulty urinating, headache or slowing of the baby’s heart. Very rare risks include infection, bleeding, seizure, irregular heart rhythms and nerve injury.  Regional Anesthesia (“spinal”, “epidural”, & “nerve blocks”):  I understand that rare but potential complications include headache, bleeding, infection, seizure, irregular heart rhythms, and nerve injury.    I can change my mind about having anesthesia services at any time before I get the medicine.    _____________________________________________________________________________  Patient (or Representative) Signature/Relationship to Patient  Date   Time    _____________________________________________________________________________   Name (if used)    Language/Organization   Time    _____________________________________________________________________________  Anesthesiologist Signature     Date   Time  I have discussed the procedure and information above with the patient (or patient’s representative) and answered their questions. The patient or their representative has agreed to have anesthesia services.    _____________________________________________________________________________  Witness        Date   Time  I have verified that the signature is that of the patient or patient’s representative, and that it was signed before the procedure  Patient Name: Moises Olsen     : 7/15/1960                 Printed: 2025     Medical Record #: [id number]                     Page 3 of 3

## (undated) NOTE — LETTER
[address]  [address]  Authorization for Surgical Operation and Procedure     Date:___________                                                                                                         Time:__________  I hereby authorize Surgeon(s):  Jacques Mack MD, my physician and his/her assistants (if applicable), which may include medical students, residents, and/or fellows, to perform the following surgical operation/ procedure and administer such anesthesia as may be determined necessary by my physician:  Operation/Procedure name (s) CAPSULE  ENDOSCOPY on Moises Olsen   2.   I recognize that during the surgical operation/procedure, unforeseen conditions may necessitate additional or different procedures than those listed above.  I, therefore, further authorize and request that the above-named surgeon, assistants, or designees perform such procedures as are, in their judgment, necessary and desirable.    3.   My surgeon/physician has discussed prior to my surgery the potential benefits, risks and side effects of this procedure; the likelihood of achieving goals; and potential problems that might occur during recuperation.  They also discussed reasonable alternatives to the procedure, including risks, benefits, and side effects related to the alternatives and risks related to not receiving this procedure.  I have had all my questions answered and I acknowledge that no guarantee has been made as to the result that may be obtained.    4.   Should the need arise during my operation/procedure, which includes change of level of care prior to discharge, I also consent to the administration of blood and/or blood products.  Further, I understand that despite careful testing and screening of blood or blood products by collecting agencies, I may still be subject to ill effects as a result of receiving a blood transfusion and/or blood products.  The following are some, but not  all, of the potential risks that can occur: fever and allergic reactions, hemolytic reactions, transmission of diseases such as Hepatitis, AIDS and Cytomegalovirus (CMV) and fluid overload.  In the event that I wish to have an autologous transfusion of my own blood, or a directed donor transfusion, I will discuss this with my physician.  Check only if Refusing Blood or Blood Products  I understand refusal of blood or blood products as deemed necessary by my physician may have serious consequences to my condition to include possible death. I hereby assume responsibility for my refusal and release the hospital, its personnel, and my physicians from any responsibility for the consequences of my refusal.          o  Refuse      5.   I authorize the use of any specimen, organs, tissues, body parts or foreign objects that may be removed from my body during the operation/procedure for diagnosis, research or teaching purposes and their subsequent disposal by hospital authorities.  I also authorize the release of specimen test results and/or written reports to my treating physician on the hospital medical staff or other referring or consulting physicians involved in my care, at the discretion of the Pathologist or my treating physician.    6.   I consent to the photographing or videotaping of the operations or procedures to be performed, including appropriate portions of my body for medical, scientific, or educational purposes, provided my identity is not revealed by the pictures or by descriptive texts accompanying them.  If the procedure has been photographed/videotaped, the surgeon will obtain the original picture, image, videotape or CD.  The hospital will not be responsible for storage, release or maintenance of the picture, image, tape or CD.    7.   I consent to the presence of a  or observers in the operating room as deemed necessary by my physician or their designees.    8.   I recognize that in  the event my procedure results in extended X-Ray/fluoroscopy time, I may develop a skin reaction.    9. If I have a Do Not Attempt Resuscitation (DNAR) order in place, that status will be suspended while in the operating room, procedural suite, and during the recovery period unless otherwise explicitly stated by me (or a person authorized to consent on my behalf). The surgeon or my attending physician will determine when the applicable recovery period ends for purposes of reinstating the DNAR order.  10. Patients having a sterilization procedure: I understand that if the procedure is successful the results will be permanent and it will therefore be impossible for me to inseminate, conceive, or bear children.  I also understand that the procedure is intended to result in sterility, although the result has not been guaranteed.   11. I acknowledge that my physician has explained sedation/analgesia administration to me including the risk and benefits I consent to the administration of sedation/analgesia as may be necessary or desirable in the judgment of my physician.    I CERTIFY THAT I HAVE READ AND FULLY UNDERSTAND THE ABOVE CONSENT TO OPERATION and/or OTHER PROCEDURE.    _________________________________________  __________________________________  Signature of Patient     Signature of Responsible Person         ___________________________________         Printed Name of Responsible Person           _________________________________                 Relationship to Patient  _________________________________________  ______________________________  Signature of Witness          Date  Time      Patient Name: Moises Olsen     : 7/15/1960                 Printed: April 15, 2025     Medical Record #: [id number]                     Page 1 of [address] [place] [address]  [id number]    Consent for Anesthesia    I, Moises Olsen agree to be cared for by an  anesthesiologist, who is specially trained to monitor me and give me medicine to put me to sleep or keep me comfortable during my procedure    I understand that my anesthesiologist is not an employee or agent of [facility] or [employer]. He or she works for [employer].    As the patient asking for anesthesia services, I agree to:  Allow the anesthesiologist (anesthesia doctor) to give me medicine and do additional procedures as necessary. Some examples are: Starting or using an “IV” to give me medicine, fluids or blood during my procedure, and having a breathing tube placed to help me breathe when I’m asleep (intubation). In the event that my heart stops working properly, I understand that my anesthesiologist will make every effort to sustain my life, unless otherwise directed by [facility] Do Not Resuscitate documents.  Tell my anesthesia doctor before my procedure:  If I am pregnant.  The last time that I ate or drank.  All of the medicines I take (including prescriptions, herbal supplements, and pills I can buy without a prescription (including street drugs/illegal medications). Failure to inform my anesthesiologist about these medicines may increase my risk of anesthetic complications.  If I am allergic to anything or have had a reaction to anesthesia before.  I understand how the anesthesia medicine will help me (benefits).  I understand that with any type of anesthesia medicine there are risks:  The most common risks are: nausea, vomiting, sore throat, muscle soreness, damage to my eyes, mouth, or teeth (from breathing tube placement).  Rare risks include: remembering what happened during my procedure, allergic reactions to medications, injury to my airway, heart, lungs, vision, nerves, or muscles and in extremely rare instances death.  My doctor has explained to me other choices available to me for my care (alternatives).  Pregnant Patients (“epidural”):  I understand  that the risks of having an epidural (medicine given into my back to help control pain during labor), include itching, low blood pressure, difficulty urinating, headache or slowing of the baby’s heart. Very rare risks include infection, bleeding, seizure, irregular heart rhythms and nerve injury.  Regional Anesthesia (“spinal”, “epidural”, & “nerve blocks”):  I understand that rare but potential complications include headache, bleeding, infection, seizure, irregular heart rhythms, and nerve injury.    I can change my mind about having anesthesia services at any time before I get the medicine.    _____________________________________________________________________________  Patient (or Representative) Signature/Relationship to Patient  Date   Time    _____________________________________________________________________________   Name (if used)    Language/Organization   Time    _____________________________________________________________________________  Anesthesiologist Signature     Date   Time  I have discussed the procedure and information above with the patient (or patient’s representative) and answered their questions. The patient or their representative has agreed to have anesthesia services.    _____________________________________________________________________________  Witness        Date   Time  I have verified that the signature is that of the patient or patient’s representative, and that it was signed before the procedure  Patient Name: Moises Olsen     : 7/15/1960                 Printed: April 15, 2025     Medical Record #: [id number]                     Page 2 of 2

## (undated) NOTE — LETTER
[address]  [address]  Authorization for Surgical Operation and Procedure     Date:___________                                                                                                         Time:__________  I hereby authorize Surgeon(s):  Jacques Mack MD, my physician and his/her assistants (if applicable), which may include medical students, residents, and/or fellows, to perform the following surgical operation/ procedure and administer such anesthesia as may be determined necessary by my physician:  Operation/ CAPSULE ENDOSCOPYon Moises Olsen   2.   I recognize that during the surgical operation/procedure, unforeseen conditions may necessitate additional or different procedures than those listed above.  I, therefore, further authorize and request that the above-named surgeon, assistants, or designees perform such procedures as are, in their judgment, necessary and desirable.    3.   My surgeon/physician has discussed prior to my surgery the potential benefits, risks and side effects of this procedure; the likelihood of achieving goals; and potential problems that might occur during recuperation.  They also discussed reasonable alternatives to the procedure, including risks, benefits, and side effects related to the alternatives and risks related to not receiving this procedure.  I have had all my questions answered and I acknowledge that no guarantee has been made as to the result that may be obtained.    4.   Should the need arise during my operation/procedure, which includes change of level of care prior to discharge, I also consent to the administration of blood and/or blood products.  Further, I understand that despite careful testing and screening of blood or blood products by collecting agencies, I may still be subject to ill effects as a result of receiving a blood transfusion and/or blood products.  The following are some, but not all, of the potential  risks that can occur: fever and allergic reactions, hemolytic reactions, transmission of diseases such as Hepatitis, AIDS and Cytomegalovirus (CMV) and fluid overload.  In the event that I wish to have an autologous transfusion of my own blood, or a directed donor transfusion, I will discuss this with my physician.  Check only if Refusing Blood or Blood Products  I understand refusal of blood or blood products as deemed necessary by my physician may have serious consequences to my condition to include possible death. I hereby assume responsibility for my refusal and release the hospital, its personnel, and my physicians from any responsibility for the consequences of my refusal.          o  Refuse      5.   I authorize the use of any specimen, organs, tissues, body parts or foreign objects that may be removed from my body during the operation/procedure for diagnosis, research or teaching purposes and their subsequent disposal by hospital authorities.  I also authorize the release of specimen test results and/or written reports to my treating physician on the hospital medical staff or other referring or consulting physicians involved in my care, at the discretion of the Pathologist or my treating physician.    6.   I consent to the photographing or videotaping of the operations or procedures to be performed, including appropriate portions of my body for medical, scientific, or educational purposes, provided my identity is not revealed by the pictures or by descriptive texts accompanying them.  If the procedure has been photographed/videotaped, the surgeon will obtain the original picture, image, videotape or CD.  The hospital will not be responsible for storage, release or maintenance of the picture, image, tape or CD.    7.   I consent to the presence of a  or observers in the operating room as deemed necessary by my physician or their designees.    8.   I recognize that in the event my procedure  results in extended X-Ray/fluoroscopy time, I may develop a skin reaction.    9. If I have a Do Not Attempt Resuscitation (DNAR) order in place, that status will be suspended while in the operating room, procedural suite, and during the recovery period unless otherwise explicitly stated by me (or a person authorized to consent on my behalf). The surgeon or my attending physician will determine when the applicable recovery period ends for purposes of reinstating the DNAR order.  10. Patients having a sterilization procedure: I understand that if the procedure is successful the results will be permanent and it will therefore be impossible for me to inseminate, conceive, or bear children.  I also understand that the procedure is intended to result in sterility, although the result has not been guaranteed.   11. I acknowledge that my physician has explained sedation/analgesia administration to me including the risk and benefits I consent to the administration of sedation/analgesia as may be necessary or desirable in the judgment of my physician.    I CERTIFY THAT I HAVE READ AND FULLY UNDERSTAND THE ABOVE CONSENT TO OPERATION and/or OTHER PROCEDURE.    _________________________________________  __________________________________  Signature of Patient     Signature of Responsible Person         ___________________________________         Printed Name of Responsible Person           _________________________________                 Relationship to Patient  _________________________________________  ______________________________  Signature of Witness          Date  Time      Patient Name: Moises Olsen     : 7/15/1960                 Printed: April 15, 2025     Medical Record #: [id number]                     Page 1 [address]  [address]    Consent for Anesthesia    I, Moises Olsen agree to be cared for by an anesthesiologist, who is specially  trained to monitor me and give me medicine to put me to sleep or keep me comfortable during my procedure    I understand that my anesthesiologist is not an employee or agent of [facility] or [employer]. He or she works for [employer].    As the patient asking for anesthesia services, I agree to:  Allow the anesthesiologist (anesthesia doctor) to give me medicine and do additional procedures as necessary. Some examples are: Starting or using an “IV” to give me medicine, fluids or blood during my procedure, and having a breathing tube placed to help me breathe when I’m asleep (intubation). In the event that my heart stops working properly, I understand that my anesthesiologist will make every effort to sustain my life, unless otherwise directed by [facility] Do Not Resuscitate documents.  Tell my anesthesia doctor before my procedure:  If I am pregnant.  The last time that I ate or drank.  All of the medicines I take (including prescriptions, herbal supplements, and pills I can buy without a prescription (including street drugs/illegal medications). Failure to inform my anesthesiologist about these medicines may increase my risk of anesthetic complications.  If I am allergic to anything or have had a reaction to anesthesia before.  I understand how the anesthesia medicine will help me (benefits).  I understand that with any type of anesthesia medicine there are risks:  The most common risks are: nausea, vomiting, sore throat, muscle soreness, damage to my eyes, mouth, or teeth (from breathing tube placement).  Rare risks include: remembering what happened during my procedure, allergic reactions to medications, injury to my airway, heart, lungs, vision, nerves, or muscles and in extremely rare instances death.  My doctor has explained to me other choices available to me for my care (alternatives).  Pregnant Patients (“epidural”):  I understand that the risks of having an  epidural (medicine given into my back to help control pain during labor), include itching, low blood pressure, difficulty urinating, headache or slowing of the baby’s heart. Very rare risks include infection, bleeding, seizure, irregular heart rhythms and nerve injury.  Regional Anesthesia (“spinal”, “epidural”, & “nerve blocks”):  I understand that rare but potential complications include headache, bleeding, infection, seizure, irregular heart rhythms, and nerve injury.    I can change my mind about having anesthesia services at any time before I get the medicine.    _____________________________________________________________________________  Patient (or Representative) Signature/Relationship to Patient  Date   Time    _____________________________________________________________________________   Name (if used)    Language/Organization   Time    _____________________________________________________________________________  Anesthesiologist Signature     Date   Time  I have discussed the procedure and information above with the patient (or patient’s representative) and answered their questions. The patient or their representative has agreed to have anesthesia services.    _____________________________________________________________________________  Witness        Date   Time  I have verified that the signature is that of the patient or patient’s representative, and that it was signed before the procedure  Patient Name: Moises Olsen     : 7/15/1960                 Printed: April 15, 2025     Medical Record #: [id number]                     Page 2 of 2

## (undated) NOTE — LETTER
[address]  [address]  Authorization for Surgical Operation and Procedure     Date:___________                                                                                                         Time:__________  I hereby authorize Surgeon(s):  Jacques Mack MD, my physician and his/her assistants (if applicable), which may include medical students, residents, and/or fellows, to perform the following surgical operation/ procedure and administer such anesthesia as may be determined necessary by my physician:  Operation/Procedure name (s) Procedure(s):  CAPSULE ENDOSCOPY, ESOPHAGOGASTRODUODENOSCOPY  ESOPHAGOGASTRODUODENOSCOPY (EGD) on Moises Olsen   2.   I recognize that during the surgical operation/procedure, unforeseen conditions may necessitate additional or different procedures than those listed above.  I, therefore, further authorize and request that the above-named surgeon, assistants, or designees perform such procedures as are, in their judgment, necessary and desirable.    3.   My surgeon/physician has discussed prior to my surgery the potential benefits, risks and side effects of this procedure; the likelihood of achieving goals; and potential problems that might occur during recuperation.  They also discussed reasonable alternatives to the procedure, including risks, benefits, and side effects related to the alternatives and risks related to not receiving this procedure.  I have had all my questions answered and I acknowledge that no guarantee has been made as to the result that may be obtained.    4.   Should the need arise during my operation/procedure, which includes change of level of care prior to discharge, I also consent to the administration of blood and/or blood products.  Further, I understand that despite careful testing and screening of blood or blood products by collecting agencies, I may still be subject to ill effects as a result of receiving a  blood transfusion and/or blood products.  The following are some, but not all, of the potential risks that can occur: fever and allergic reactions, hemolytic reactions, transmission of diseases such as Hepatitis, AIDS and Cytomegalovirus (CMV) and fluid overload.  In the event that I wish to have an autologous transfusion of my own blood, or a directed donor transfusion, I will discuss this with my physician.  Check only if Refusing Blood or Blood Products  I understand refusal of blood or blood products as deemed necessary by my physician may have serious consequences to my condition to include possible death. I hereby assume responsibility for my refusal and release the hospital, its personnel, and my physicians from any responsibility for the consequences of my refusal.          o  Refuse      5.   I authorize the use of any specimen, organs, tissues, body parts or foreign objects that may be removed from my body during the operation/procedure for diagnosis, research or teaching purposes and their subsequent disposal by hospital authorities.  I also authorize the release of specimen test results and/or written reports to my treating physician on the hospital medical staff or other referring or consulting physicians involved in my care, at the discretion of the Pathologist or my treating physician.    6.   I consent to the photographing or videotaping of the operations or procedures to be performed, including appropriate portions of my body for medical, scientific, or educational purposes, provided my identity is not revealed by the pictures or by descriptive texts accompanying them.  If the procedure has been photographed/videotaped, the surgeon will obtain the original picture, image, videotape or CD.  The hospital will not be responsible for storage, release or maintenance of the picture, image, tape or CD.    7.   I consent to the presence of a  or observers in the operating room as deemed  necessary by my physician or their designees.    8.   I recognize that in the event my procedure results in extended X-Ray/fluoroscopy time, I may develop a skin reaction.    9. If I have a Do Not Attempt Resuscitation (DNAR) order in place, that status will be suspended while in the operating room, procedural suite, and during the recovery period unless otherwise explicitly stated by me (or a person authorized to consent on my behalf). The surgeon or my attending physician will determine when the applicable recovery period ends for purposes of reinstating the DNAR order.  10. Patients having a sterilization procedure: I understand that if the procedure is successful the results will be permanent and it will therefore be impossible for me to inseminate, conceive, or bear children.  I also understand that the procedure is intended to result in sterility, although the result has not been guaranteed.   11. I acknowledge that my physician has explained sedation/analgesia administration to me including the risk and benefits I consent to the administration of sedation/analgesia as may be necessary or desirable in the judgment of my physician.    I CERTIFY THAT I HAVE READ AND FULLY UNDERSTAND THE ABOVE CONSENT TO OPERATION and/or OTHER PROCEDURE.    _________________________________________  __________________________________  Signature of Patient     Signature of Responsible Person         ___________________________________         Printed Name of Responsible Person           _________________________________                 Relationship to Patient  _________________________________________  ______________________________  Signature of Witness          Date  Time      Patient Name: Moisse Olsen     : 7/15/1960                 Printed: 2025     Medical Record #: [id number]                     Page 1 of 2                                    [address]  [address]    Consent  for Anesthesia    I, Moises Olsen agree to be cared for by an anesthesiologist, who is specially trained to monitor me and give me medicine to put me to sleep or keep me comfortable during my procedure    I understand that my anesthesiologist is not an employee or agent of [facility] or [employer]. He or she works for [employer].    As the patient asking for anesthesia services, I agree to:  Allow the anesthesiologist (anesthesia doctor) to give me medicine and do additional procedures as necessary. Some examples are: Starting or using an “IV” to give me medicine, fluids or blood during my procedure, and having a breathing tube placed to help me breathe when I’m asleep (intubation). In the event that my heart stops working properly, I understand that my anesthesiologist will make every effort to sustain my life, unless otherwise directed by [facility] Do Not Resuscitate documents.  Tell my anesthesia doctor before my procedure:  If I am pregnant.  The last time that I ate or drank.  All of the medicines I take (including prescriptions, herbal supplements, and pills I can buy without a prescription (including street drugs/illegal medications). Failure to inform my anesthesiologist about these medicines may increase my risk of anesthetic complications.  If I am allergic to anything or have had a reaction to anesthesia before.  I understand how the anesthesia medicine will help me (benefits).  I understand that with any type of anesthesia medicine there are risks:  The most common risks are: nausea, vomiting, sore throat, muscle soreness, damage to my eyes, mouth, or teeth (from breathing tube placement).  Rare risks include: remembering what happened during my procedure, allergic reactions to medications, injury to my airway, heart, lungs, vision, nerves, or muscles and in extremely rare instances death.  My doctor has explained to me other choices available to me for my care  (alternatives).  Pregnant Patients (“epidural”):  I understand that the risks of having an epidural (medicine given into my back to help control pain during labor), include itching, low blood pressure, difficulty urinating, headache or slowing of the baby’s heart. Very rare risks include infection, bleeding, seizure, irregular heart rhythms and nerve injury.  Regional Anesthesia (“spinal”, “epidural”, & “nerve blocks”):  I understand that rare but potential complications include headache, bleeding, infection, seizure, irregular heart rhythms, and nerve injury.    I can change my mind about having anesthesia services at any time before I get the medicine.    _____________________________________________________________________________  Patient (or Representative) Signature/Relationship to Patient  Date   Time    _____________________________________________________________________________   Name (if used)    Language/Organization   Time    _____________________________________________________________________________  Anesthesiologist Signature     Date   Time  I have discussed the procedure and information above with the patient (or patient’s representative) and answered their questions. The patient or their representative has agreed to have anesthesia services.    _____________________________________________________________________________  Witness        Date   Time  I have verified that the signature is that of the patient or patient’s representative, and that it was signed before the procedure  Patient Name: Moises Olsen     : 7/15/1960                 Printed: 2025     Medical Record #: [id number]                     Page 2 of 2

## (undated) NOTE — LETTER
Nikki Lamb [address]  [place], [address] [facility]    Consent for Operation  Date: __________________                                Time: _______________    1.  I authorize the performance upon [facility] the following operation:  Procedure(s): procedure has been videotaped, the surgeon will obtain the original videotape. The hospital will not be responsible for storage or maintenance of this tape.   7. For the purpose of advancing medical education, I consent to the admittance of observers to the STATEMENTS REQUIRING INSERTION OR COMPLETION WERE FILLED IN.     Signature of Patient:   ___________________________    When the patient is a minor or mentally incompetent to give consent:  Signature of person authorized to consent for patient: ____________ drugs/illegal medications). Failure to inform my anesthesiologist about these medicines may increase my risk of anesthetic complications. iv. If I am allergic to anything or have had a reaction to anesthesia before.   3. I understand how the anesthesia med I have discussed the procedure and information above with the patient (or patient’s representative) and answered their questions. The patient or their representative has agreed to have anesthesia services.     _______________________________________________

## (undated) NOTE — LETTER
2025    Return to Work    Name: Moises Olsne        : 7/15/1960    To Whom It May Concern,    Moises Olsen had surgery on 25 and is:    Please excuse the patient from work for 2 weeks. Further return to work recommendations will be provided at the patient's follow up visit.    If there are any further questions, regarding this patient's care, please contact the patient directly.    Sincerely,    CASANDRA Page

## (undated) NOTE — LETTER
[address]  [address]  Authorization for Surgical Operation and Procedure     Date:___________                                                                                                         Time:__________  I hereby authorize Surgeon(s):  Jacques Mack MD, my physician and his/her assistants (if applicable), which may include medical students, residents, and/or fellows, to perform the following surgical operation/ procedure and administer such anesthesia as may be determined necessary by my physician:  Operation/Procedure name (s) Procedure(s):  CAPSULE ENDOSCOPY, ESOPHAGOGASTRODUODENOSCOPY  ESOPHAGOGASTRODUODENOSCOPY (EGD) on Moises Olsen   2.   I recognize that during the surgical operation/procedure, unforeseen conditions may necessitate additional or different procedures than those listed above.  I, therefore, further authorize and request that the above-named surgeon, assistants, or designees perform such procedures as are, in their judgment, necessary and desirable.    3.   My surgeon/physician has discussed prior to my surgery the potential benefits, risks and side effects of this procedure; the likelihood of achieving goals; and potential problems that might occur during recuperation.  They also discussed reasonable alternatives to the procedure, including risks, benefits, and side effects related to the alternatives and risks related to not receiving this procedure.  I have had all my questions answered and I acknowledge that no guarantee has been made as to the result that may be obtained.    4.   Should the need arise during my operation/procedure, which includes change of level of care prior to discharge, I also consent to the administration of blood and/or blood products.  Further, I understand that despite careful testing and screening of blood or blood products by collecting agencies, I may still be subject to ill effects as a result of receiving a  blood transfusion and/or blood products.  The following are some, but not all, of the potential risks that can occur: fever and allergic reactions, hemolytic reactions, transmission of diseases such as Hepatitis, AIDS and Cytomegalovirus (CMV) and fluid overload.  In the event that I wish to have an autologous transfusion of my own blood, or a directed donor transfusion, I will discuss this with my physician.  Check only if Refusing Blood or Blood Products  I understand refusal of blood or blood products as deemed necessary by my physician may have serious consequences to my condition to include possible death. I hereby assume responsibility for my refusal and release the hospital, its personnel, and my physicians from any responsibility for the consequences of my refusal.          o  Refuse      5.   I authorize the use of any specimen, organs, tissues, body parts or foreign objects that may be removed from my body during the operation/procedure for diagnosis, research or teaching purposes and their subsequent disposal by hospital authorities.  I also authorize the release of specimen test results and/or written reports to my treating physician on the hospital medical staff or other referring or consulting physicians involved in my care, at the discretion of the Pathologist or my treating physician.    6.   I consent to the photographing or videotaping of the operations or procedures to be performed, including appropriate portions of my body for medical, scientific, or educational purposes, provided my identity is not revealed by the pictures or by descriptive texts accompanying them.  If the procedure has been photographed/videotaped, the surgeon will obtain the original picture, image, videotape or CD.  The hospital will not be responsible for storage, release or maintenance of the picture, image, tape or CD.    7.   I consent to the presence of a  or observers in the operating room as deemed  necessary by my physician or their designees.    8.   I recognize that in the event my procedure results in extended X-Ray/fluoroscopy time, I may develop a skin reaction.    9. If I have a Do Not Attempt Resuscitation (DNAR) order in place, that status will be suspended while in the operating room, procedural suite, and during the recovery period unless otherwise explicitly stated by me (or a person authorized to consent on my behalf). The surgeon or my attending physician will determine when the applicable recovery period ends for purposes of reinstating the DNAR order.  10. Patients having a sterilization procedure: I understand that if the procedure is successful the results will be permanent and it will therefore be impossible for me to inseminate, conceive, or bear children.  I also understand that the procedure is intended to result in sterility, although the result has not been guaranteed.   11. I acknowledge that my physician has explained sedation/analgesia administration to me including the risk and benefits I consent to the administration of sedation/analgesia as may be necessary or desirable in the judgment of my physician.    I CERTIFY THAT I HAVE READ AND FULLY UNDERSTAND THE ABOVE CONSENT TO OPERATION and/or OTHER PROCEDURE.    _________________________________________  __________________________________  Signature of Patient     Signature of Responsible Person         ___________________________________         Printed Name of Responsible Person           _________________________________                 Relationship to Patient  _________________________________________  ______________________________  Signature of Witness          Date  Time      Patient Name: Moises Olsen     : 7/15/1960                 Printed: 2025     Medical Record #: [id number]                     Page 1 of 2                                    [address]  [address]    Consent  for Anesthesia    I, Moises Olsen agree to be cared for by an anesthesiologist, who is specially trained to monitor me and give me medicine to put me to sleep or keep me comfortable during my procedure    I understand that my anesthesiologist is not an employee or agent of [facility] or [employer]. He or she works for [employer].    As the patient asking for anesthesia services, I agree to:  Allow the anesthesiologist (anesthesia doctor) to give me medicine and do additional procedures as necessary. Some examples are: Starting or using an “IV” to give me medicine, fluids or blood during my procedure, and having a breathing tube placed to help me breathe when I’m asleep (intubation). In the event that my heart stops working properly, I understand that my anesthesiologist will make every effort to sustain my life, unless otherwise directed by [facility] Do Not Resuscitate documents.  Tell my anesthesia doctor before my procedure:  If I am pregnant.  The last time that I ate or drank.  All of the medicines I take (including prescriptions, herbal supplements, and pills I can buy without a prescription (including street drugs/illegal medications). Failure to inform my anesthesiologist about these medicines may increase my risk of anesthetic complications.  If I am allergic to anything or have had a reaction to anesthesia before.  I understand how the anesthesia medicine will help me (benefits).  I understand that with any type of anesthesia medicine there are risks:  The most common risks are: nausea, vomiting, sore throat, muscle soreness, damage to my eyes, mouth, or teeth (from breathing tube placement).  Rare risks include: remembering what happened during my procedure, allergic reactions to medications, injury to my airway, heart, lungs, vision, nerves, or muscles and in extremely rare instances death.  My doctor has explained to me other choices available to me for my care  (alternatives).  Pregnant Patients (“epidural”):  I understand that the risks of having an epidural (medicine given into my back to help control pain during labor), include itching, low blood pressure, difficulty urinating, headache or slowing of the baby’s heart. Very rare risks include infection, bleeding, seizure, irregular heart rhythms and nerve injury.  Regional Anesthesia (“spinal”, “epidural”, & “nerve blocks”):  I understand that rare but potential complications include headache, bleeding, infection, seizure, irregular heart rhythms, and nerve injury.    I can change my mind about having anesthesia services at any time before I get the medicine.    _____________________________________________________________________________  Patient (or Representative) Signature/Relationship to Patient  Date   Time    _____________________________________________________________________________   Name (if used)    Language/Organization   Time    _____________________________________________________________________________  Anesthesiologist Signature     Date   Time  I have discussed the procedure and information above with the patient (or patient’s representative) and answered their questions. The patient or their representative has agreed to have anesthesia services.    _____________________________________________________________________________  Witness        Date   Time  I have verified that the signature is that of the patient or patient’s representative, and that it was signed before the procedure  Patient Name: Moises Olsen     : 7/15/1960                 Printed: 2025     Medical Record #: [id number]                     Page 2 of 2

## (undated) NOTE — LETTER
[address]  [address]  Authorization for Surgical Operation and Procedure     Date:___________                                                                                                         Time:__________  I hereby authorize Surgeon(s):  Jacques Mack MD, my physician and his/her assistants (if applicable), which may include medical students, residents, and/or fellows, to perform the following surgical operation/ procedure and administer such anesthesia as may be determined necessary by my physician:  Operation/Procedure name (s) Procedure(s):  CAPSULE ENDOSCOPY, ESOPHAGOGASTRODUODENOSCOPY  ESOPHAGOGASTRODUODENOSCOPY (EGD) on Moises Olsen   2.   I recognize that during the surgical operation/procedure, unforeseen conditions may necessitate additional or different procedures than those listed above.  I, therefore, further authorize and request that the above-named surgeon, assistants, or designees perform such procedures as are, in their judgment, necessary and desirable.    3.   My surgeon/physician has discussed prior to my surgery the potential benefits, risks and side effects of this procedure; the likelihood of achieving goals; and potential problems that might occur during recuperation.  They also discussed reasonable alternatives to the procedure, including risks, benefits, and side effects related to the alternatives and risks related to not receiving this procedure.  I have had all my questions answered and I acknowledge that no guarantee has been made as to the result that may be obtained.    4.   Should the need arise during my operation/procedure, which includes change of level of care prior to discharge, I also consent to the administration of blood and/or blood products.  Further, I understand that despite careful testing and screening of blood or blood products by collecting agencies, I may still be subject to ill effects as a result of receiving a  blood transfusion and/or blood products.  The following are some, but not all, of the potential risks that can occur: fever and allergic reactions, hemolytic reactions, transmission of diseases such as Hepatitis, AIDS and Cytomegalovirus (CMV) and fluid overload.  In the event that I wish to have an autologous transfusion of my own blood, or a directed donor transfusion, I will discuss this with my physician.  Check only if Refusing Blood or Blood Products  I understand refusal of blood or blood products as deemed necessary by my physician may have serious consequences to my condition to include possible death. I hereby assume responsibility for my refusal and release the hospital, its personnel, and my physicians from any responsibility for the consequences of my refusal.          o  Refuse      5.   I authorize the use of any specimen, organs, tissues, body parts or foreign objects that may be removed from my body during the operation/procedure for diagnosis, research or teaching purposes and their subsequent disposal by hospital authorities.  I also authorize the release of specimen test results and/or written reports to my treating physician on the hospital medical staff or other referring or consulting physicians involved in my care, at the discretion of the Pathologist or my treating physician.    6.   I consent to the photographing or videotaping of the operations or procedures to be performed, including appropriate portions of my body for medical, scientific, or educational purposes, provided my identity is not revealed by the pictures or by descriptive texts accompanying them.  If the procedure has been photographed/videotaped, the surgeon will obtain the original picture, image, videotape or CD.  The hospital will not be responsible for storage, release or maintenance of the picture, image, tape or CD.    7.   I consent to the presence of a  or observers in the operating room as deemed  necessary by my physician or their designees.    8.   I recognize that in the event my procedure results in extended X-Ray/fluoroscopy time, I may develop a skin reaction.    9. If I have a Do Not Attempt Resuscitation (DNAR) order in place, that status will be suspended while in the operating room, procedural suite, and during the recovery period unless otherwise explicitly stated by me (or a person authorized to consent on my behalf). The surgeon or my attending physician will determine when the applicable recovery period ends for purposes of reinstating the DNAR order.  10. Patients having a sterilization procedure: I understand that if the procedure is successful the results will be permanent and it will therefore be impossible for me to inseminate, conceive, or bear children.  I also understand that the procedure is intended to result in sterility, although the result has not been guaranteed.   11. I acknowledge that my physician has explained sedation/analgesia administration to me including the risk and benefits I consent to the administration of sedation/analgesia as may be necessary or desirable in the judgment of my physician.    I CERTIFY THAT I HAVE READ AND FULLY UNDERSTAND THE ABOVE CONSENT TO OPERATION and/or OTHER PROCEDURE.    _________________________________________  __________________________________  Signature of Patient     Signature of Responsible Person         ___________________________________         Printed Name of Responsible Person           _________________________________                 Relationship to Patient  _________________________________________  ______________________________  Signature of Witness          Date  Time      Patient Name: Moises Olsen     : 7/15/1960                 Printed: 2025     Medical Record #: [id number]                     Page 1 of 2                                    [address]  [address]    Consent  for Anesthesia    I, Moises Olsen agree to be cared for by an anesthesiologist, who is specially trained to monitor me and give me medicine to put me to sleep or keep me comfortable during my procedure    I understand that my anesthesiologist is not an employee or agent of [facility] or [employer]. He or she works for [employer].    As the patient asking for anesthesia services, I agree to:  Allow the anesthesiologist (anesthesia doctor) to give me medicine and do additional procedures as necessary. Some examples are: Starting or using an “IV” to give me medicine, fluids or blood during my procedure, and having a breathing tube placed to help me breathe when I’m asleep (intubation). In the event that my heart stops working properly, I understand that my anesthesiologist will make every effort to sustain my life, unless otherwise directed by [facility] Do Not Resuscitate documents.  Tell my anesthesia doctor before my procedure:  If I am pregnant.  The last time that I ate or drank.  All of the medicines I take (including prescriptions, herbal supplements, and pills I can buy without a prescription (including street drugs/illegal medications). Failure to inform my anesthesiologist about these medicines may increase my risk of anesthetic complications.  If I am allergic to anything or have had a reaction to anesthesia before.  I understand how the anesthesia medicine will help me (benefits).  I understand that with any type of anesthesia medicine there are risks:  The most common risks are: nausea, vomiting, sore throat, muscle soreness, damage to my eyes, mouth, or teeth (from breathing tube placement).  Rare risks include: remembering what happened during my procedure, allergic reactions to medications, injury to my airway, heart, lungs, vision, nerves, or muscles and in extremely rare instances death.  My doctor has explained to me other choices available to me for my care  (alternatives).  Pregnant Patients (“epidural”):  I understand that the risks of having an epidural (medicine given into my back to help control pain during labor), include itching, low blood pressure, difficulty urinating, headache or slowing of the baby’s heart. Very rare risks include infection, bleeding, seizure, irregular heart rhythms and nerve injury.  Regional Anesthesia (“spinal”, “epidural”, & “nerve blocks”):  I understand that rare but potential complications include headache, bleeding, infection, seizure, irregular heart rhythms, and nerve injury.    I can change my mind about having anesthesia services at any time before I get the medicine.    _____________________________________________________________________________  Patient (or Representative) Signature/Relationship to Patient  Date   Time    _____________________________________________________________________________   Name (if used)    Language/Organization   Time    _____________________________________________________________________________  Anesthesiologist Signature     Date   Time  I have discussed the procedure and information above with the patient (or patient’s representative) and answered their questions. The patient or their representative has agreed to have anesthesia services.    _____________________________________________________________________________  Witness        Date   Time  I have verified that the signature is that of the patient or patient’s representative, and that it was signed before the procedure  Patient Name: Moises Olsen     : 7/15/1960                 Printed: 2025     Medical Record #: [id number]                     Page 2 of 2

## (undated) NOTE — LETTER
[address]  [address]  Authorization for Surgical Operation and Procedure     Date: 12/16/2024                                                                                                         Time: 0940  I hereby authorize Surgeon(s):  Laron Pichardo MD, my physician and his/her assistants (if applicable), which may include medical students, residents, and/or fellows, to perform the following surgical operation/ procedure and administer such anesthesia as may be determined necessary by my physician:  Operation/Procedure name (s) Procedure(s):  ESOPHAGOGASTRODUODENOSCOPY (EGD) on Moises Olsen   2.   I recognize that during the surgical operation/procedure, unforeseen conditions may necessitate additional or different procedures than those listed above.  I, therefore, further authorize and request that the above-named surgeon, assistants, or designees perform such procedures as are, in their judgment, necessary and desirable.    3.   My surgeon/physician has discussed prior to my surgery the potential benefits, risks and side effects of this procedure; the likelihood of achieving goals; and potential problems that might occur during recuperation.  They also discussed reasonable alternatives to the procedure, including risks, benefits, and side effects related to the alternatives and risks related to not receiving this procedure.  I have had all my questions answered and I acknowledge that no guarantee has been made as to the result that may be obtained.    4.   Should the need arise during my operation/procedure, which includes change of level of care prior to discharge, I also consent to the administration of blood and/or blood products.  Further, I understand that despite careful testing and screening of blood or blood products by collecting agencies, I may still be subject to ill effects as a result of receiving a blood transfusion and/or blood products.  The  following are some, but not all, of the potential risks that can occur: fever and allergic reactions, hemolytic reactions, transmission of diseases such as Hepatitis, AIDS and Cytomegalovirus (CMV) and fluid overload.  In the event that I wish to have an autologous transfusion of my own blood, or a directed donor transfusion, I will discuss this with my physician.  Check only if Refusing Blood or Blood Products  I understand refusal of blood or blood products as deemed necessary by my physician may have serious consequences to my condition to include possible death. I hereby assume responsibility for my refusal and release the hospital, its personnel, and my physicians from any responsibility for the consequences of my refusal.          o  Refuse      5.   I authorize the use of any specimen, organs, tissues, body parts or foreign objects that may be removed from my body during the operation/procedure for diagnosis, research or teaching purposes and their subsequent disposal by hospital authorities.  I also authorize the release of specimen test results and/or written reports to my treating physician on the hospital medical staff or other referring or consulting physicians involved in my care, at the discretion of the Pathologist or my treating physician.    6.   I consent to the photographing or videotaping of the operations or procedures to be performed, including appropriate portions of my body for medical, scientific, or educational purposes, provided my identity is not revealed by the pictures or by descriptive texts accompanying them.  If the procedure has been photographed/videotaped, the surgeon will obtain the original picture, image, videotape or CD.  The hospital will not be responsible for storage, release or maintenance of the picture, image, tape or CD.    7.   I consent to the presence of a  or observers in the operating room as deemed necessary by my physician or their designees.     8.   I recognize that in the event my procedure results in extended X-Ray/fluoroscopy time, I may develop a skin reaction.    9. If I have a Do Not Attempt Resuscitation (DNAR) order in place, that status will be suspended while in the operating room, procedural suite, and during the recovery period unless otherwise explicitly stated by me (or a person authorized to consent on my behalf). The surgeon or my attending physician will determine when the applicable recovery period ends for purposes of reinstating the DNAR order.  10. Patients having a sterilization procedure: I understand that if the procedure is successful the results will be permanent and it will therefore be impossible for me to inseminate, conceive, or bear children.  I also understand that the procedure is intended to result in sterility, although the result has not been guaranteed.   11. I acknowledge that my physician has explained sedation/analgesia administration to me including the risk and benefits I consent to the administration of sedation/analgesia as may be necessary or desirable in the judgment of my physician.    I CERTIFY THAT I HAVE READ AND FULLY UNDERSTAND THE ABOVE CONSENT TO OPERATION and/or OTHER PROCEDURE.    _________________________________________  __________________________________  Signature of Patient     Signature of Responsible Person         ___________________________________         Printed Name of Responsible Person           _________________________________                 Relationship to Patient  _________________________________________  ______________________________  Signature of Witness          Date  Time      Patient Name: Moises Olsen     : 7/15/1960                 Printed: 2024     Medical Record #: [id number]                     Page 1 [address]  [address]    Consent for Anesthesia    I, Moises Olsen agree to be  cared for by an anesthesiologist, who is specially trained to monitor me and give me medicine to put me to sleep or keep me comfortable during my procedure    I understand that my anesthesiologist is not an employee or agent of [facility] or [employer]. He or she works for [employer].    As the patient asking for anesthesia services, I agree to:  Allow the anesthesiologist (anesthesia doctor) to give me medicine and do additional procedures as necessary. Some examples are: Starting or using an “IV” to give me medicine, fluids or blood during my procedure, and having a breathing tube placed to help me breathe when I’m asleep (intubation). In the event that my heart stops working properly, I understand that my anesthesiologist will make every effort to sustain my life, unless otherwise directed by [facility] Do Not Resuscitate documents.  Tell my anesthesia doctor before my procedure:  If I am pregnant.  The last time that I ate or drank.  All of the medicines I take (including prescriptions, herbal supplements, and pills I can buy without a prescription (including street drugs/illegal medications). Failure to inform my anesthesiologist about these medicines may increase my risk of anesthetic complications.  If I am allergic to anything or have had a reaction to anesthesia before.  I understand how the anesthesia medicine will help me (benefits).  I understand that with any type of anesthesia medicine there are risks:  The most common risks are: nausea, vomiting, sore throat, muscle soreness, damage to my eyes, mouth, or teeth (from breathing tube placement).  Rare risks include: remembering what happened during my procedure, allergic reactions to medications, injury to my airway, heart, lungs, vision, nerves, or muscles and in extremely rare instances death.  My doctor has explained to me other choices available to me for my care (alternatives).  Pregnant Patients  (“epidural”):  I understand that the risks of having an epidural (medicine given into my back to help control pain during labor), include itching, low blood pressure, difficulty urinating, headache or slowing of the baby’s heart. Very rare risks include infection, bleeding, seizure, irregular heart rhythms and nerve injury.  Regional Anesthesia (“spinal”, “epidural”, & “nerve blocks”):  I understand that rare but potential complications include headache, bleeding, infection, seizure, irregular heart rhythms, and nerve injury.    I can change my mind about having anesthesia services at any time before I get the medicine.    _____________________________________________________________________________  Patient (or Representative) Signature/Relationship to Patient  Date   Time    _____________________________________________________________________________   Name (if used)    Language/Organization   Time    _____________________________________________________________________________  Anesthesiologist Signature     Date   Time  I have discussed the procedure and information above with the patient (or patient’s representative) and answered their questions. The patient or their representative has agreed to have anesthesia services.    _____________________________________________________________________________  Witness        Date   Time  I have verified that the signature is that of the patient or patient’s representative, and that it was signed before the procedure  Patient Name: Moises Olsen     : 7/15/1960                 Printed: 2024     Medical Record #: [id number]                     Page 2 of 2

## (undated) NOTE — LETTER
[address]  [address]  Authorization for Surgical Operation and Procedure     Date:___________                                                                                                         Time:__________  I hereby authorize Surgeon(s):  Jacques Mack MD, my physician and his/her assistants (if applicable), which may include medical students, residents, and/or fellows, to perform the following surgical operation/ procedure and administer such anesthesia as may be determined necessary by my physician:  Operation/Procedure name (s) ESOPHAGOGASTRODUODENOSCOPY,WITH DEPLOYMENT OF VIDEO CAPSULE ENDOSCOPY UNDER MONITORED ANESTHESIA CARE on Moises Olsen   2.   I recognize that during the surgical operation/procedure, unforeseen conditions may necessitate additional or different procedures than those listed above.  I, therefore, further authorize and request that the above-named surgeon, assistants, or designees perform such procedures as are, in their judgment, necessary and desirable.    3.   My surgeon/physician has discussed prior to my surgery the potential benefits, risks and side effects of this procedure; the likelihood of achieving goals; and potential problems that might occur during recuperation.  They also discussed reasonable alternatives to the procedure, including risks, benefits, and side effects related to the alternatives and risks related to not receiving this procedure.  I have had all my questions answered and I acknowledge that no guarantee has been made as to the result that may be obtained.    4.   Should the need arise during my operation/procedure, which includes change of level of care prior to discharge, I also consent to the administration of blood and/or blood products.  Further, I understand that despite careful testing and screening of blood or blood products by collecting agencies, I may still be subject to ill effects as a result of  receiving a blood transfusion and/or blood products.  The following are some, but not all, of the potential risks that can occur: fever and allergic reactions, hemolytic reactions, transmission of diseases such as Hepatitis, AIDS and Cytomegalovirus (CMV) and fluid overload.  In the event that I wish to have an autologous transfusion of my own blood, or a directed donor transfusion, I will discuss this with my physician.  Check only if Refusing Blood or Blood Products  I understand refusal of blood or blood products as deemed necessary by my physician may have serious consequences to my condition to include possible death. I hereby assume responsibility for my refusal and release the hospital, its personnel, and my physicians from any responsibility for the consequences of my refusal.          o  Refuse      5.   I authorize the use of any specimen, organs, tissues, body parts or foreign objects that may be removed from my body during the operation/procedure for diagnosis, research or teaching purposes and their subsequent disposal by hospital authorities.  I also authorize the release of specimen test results and/or written reports to my treating physician on the hospital medical staff or other referring or consulting physicians involved in my care, at the discretion of the Pathologist or my treating physician.    6.   I consent to the photographing or videotaping of the operations or procedures to be performed, including appropriate portions of my body for medical, scientific, or educational purposes, provided my identity is not revealed by the pictures or by descriptive texts accompanying them.  If the procedure has been photographed/videotaped, the surgeon will obtain the original picture, image, videotape or CD.  The hospital will not be responsible for storage, release or maintenance of the picture, image, tape or CD.    7.   I consent to the presence of a  or observers in the operating room  as deemed necessary by my physician or their designees.    8.   I recognize that in the event my procedure results in extended X-Ray/fluoroscopy time, I may develop a skin reaction.    9. If I have a Do Not Attempt Resuscitation (DNAR) order in place, that status will be suspended while in the operating room, procedural suite, and during the recovery period unless otherwise explicitly stated by me (or a person authorized to consent on my behalf). The surgeon or my attending physician will determine when the applicable recovery period ends for purposes of reinstating the DNAR order.  10. Patients having a sterilization procedure: I understand that if the procedure is successful the results will be permanent and it will therefore be impossible for me to inseminate, conceive, or bear children.  I also understand that the procedure is intended to result in sterility, although the result has not been guaranteed.   11. I acknowledge that my physician has explained sedation/analgesia administration to me including the risk and benefits I consent to the administration of sedation/analgesia as may be necessary or desirable in the judgment of my physician.    I CERTIFY THAT I HAVE READ AND FULLY UNDERSTAND THE ABOVE CONSENT TO OPERATION and/or OTHER PROCEDURE.    _________________________________________  __________________________________  Signature of Patient     Signature of Responsible Person         ___________________________________         Printed Name of Responsible Person           _________________________________                 Relationship to Patient  _________________________________________  ______________________________  Signature of Witness          Date  Time      Patient Name: Moises Olsen     : 7/15/1960                 Printed: 2025     Medical Record #: [id number]                     Page 1 of 2                                    [address]   [address]    Consent for Anesthesia    IMoises agree to be cared for by an anesthesiologist, who is specially trained to monitor me and give me medicine to put me to sleep or keep me comfortable during my procedure    I understand that my anesthesiologist is not an employee or agent of [facility] or [employer]. He or she works for [employer].    As the patient asking for anesthesia services, I agree to:  Allow the anesthesiologist (anesthesia doctor) to give me medicine and do additional procedures as necessary. Some examples are: Starting or using an “IV” to give me medicine, fluids or blood during my procedure, and having a breathing tube placed to help me breathe when I’m asleep (intubation). In the event that my heart stops working properly, I understand that my anesthesiologist will make every effort to sustain my life, unless otherwise directed by [facility] Do Not Resuscitate documents.  Tell my anesthesia doctor before my procedure:  If I am pregnant.  The last time that I ate or drank.  All of the medicines I take (including prescriptions, herbal supplements, and pills I can buy without a prescription (including street drugs/illegal medications). Failure to inform my anesthesiologist about these medicines may increase my risk of anesthetic complications.  If I am allergic to anything or have had a reaction to anesthesia before.  I understand how the anesthesia medicine will help me (benefits).  I understand that with any type of anesthesia medicine there are risks:  The most common risks are: nausea, vomiting, sore throat, muscle soreness, damage to my eyes, mouth, or teeth (from breathing tube placement).  Rare risks include: remembering what happened during my procedure, allergic reactions to medications, injury to my airway, heart, lungs, vision, nerves, or muscles and in extremely rare instances death.  My doctor has explained to me other choices available to  me for my care (alternatives).  Pregnant Patients (“epidural”):  I understand that the risks of having an epidural (medicine given into my back to help control pain during labor), include itching, low blood pressure, difficulty urinating, headache or slowing of the baby’s heart. Very rare risks include infection, bleeding, seizure, irregular heart rhythms and nerve injury.  Regional Anesthesia (“spinal”, “epidural”, & “nerve blocks”):  I understand that rare but potential complications include headache, bleeding, infection, seizure, irregular heart rhythms, and nerve injury.    I can change my mind about having anesthesia services at any time before I get the medicine.    _____________________________________________________________________________  Patient (or Representative) Signature/Relationship to Patient  Date   Time    _____________________________________________________________________________   Name (if used)    Language/Organization   Time    _____________________________________________________________________________  Anesthesiologist Signature     Date   Time  I have discussed the procedure and information above with the patient (or patient’s representative) and answered their questions. The patient or their representative has agreed to have anesthesia services.    _____________________________________________________________________________  Witness        Date   Time  I have verified that the signature is that of the patient or patient’s representative, and that it was signed before the procedure  Patient Name: Moises Olsen     : 7/15/1960                 Printed: 2025     Medical Record #: [id number]                     Page 2 of 2

## (undated) NOTE — LETTER
[address]  [address]  Authorization for Surgical Operation and Procedure     Date:___________                                                                                                         Time:__________  I hereby authorize Surgeon(s):  Geoffrey Milan MD, my physician and his/her assistants (if applicable), which may include medical students, residents, and/or fellows, to perform the following surgical operation/ procedure and administer such anesthesia as may be determined necessary by my physician:  Operation/Procedure name (s) Procedure(s):  ESOPHAGOGASTRODUODENOSCOPY (EGD) on Moises Olsen   2.   I recognize that during the surgical operation/procedure, unforeseen conditions may necessitate additional or different procedures than those listed above.  I, therefore, further authorize and request that the above-named surgeon, assistants, or designees perform such procedures as are, in their judgment, necessary and desirable.    3.   My surgeon/physician has discussed prior to my surgery the potential benefits, risks and side effects of this procedure; the likelihood of achieving goals; and potential problems that might occur during recuperation.  They also discussed reasonable alternatives to the procedure, including risks, benefits, and side effects related to the alternatives and risks related to not receiving this procedure.  I have had all my questions answered and I acknowledge that no guarantee has been made as to the result that may be obtained.    4.   Should the need arise during my operation/procedure, which includes change of level of care prior to discharge, I also consent to the administration of blood and/or blood products.  Further, I understand that despite careful testing and screening of blood or blood products by collecting agencies, I may still be subject to ill effects as a result of receiving a blood transfusion and/or blood products.  The  following are some, but not all, of the potential risks that can occur: fever and allergic reactions, hemolytic reactions, transmission of diseases such as Hepatitis, AIDS and Cytomegalovirus (CMV) and fluid overload.  In the event that I wish to have an autologous transfusion of my own blood, or a directed donor transfusion, I will discuss this with my physician.  Check only if Refusing Blood or Blood Products  I understand refusal of blood or blood products as deemed necessary by my physician may have serious consequences to my condition to include possible death. I hereby assume responsibility for my refusal and release the hospital, its personnel, and my physicians from any responsibility for the consequences of my refusal.          o  Refuse      5.   I authorize the use of any specimen, organs, tissues, body parts or foreign objects that may be removed from my body during the operation/procedure for diagnosis, research or teaching purposes and their subsequent disposal by hospital authorities.  I also authorize the release of specimen test results and/or written reports to my treating physician on the hospital medical staff or other referring or consulting physicians involved in my care, at the discretion of the Pathologist or my treating physician.    6.   I consent to the photographing or videotaping of the operations or procedures to be performed, including appropriate portions of my body for medical, scientific, or educational purposes, provided my identity is not revealed by the pictures or by descriptive texts accompanying them.  If the procedure has been photographed/videotaped, the surgeon will obtain the original picture, image, videotape or CD.  The hospital will not be responsible for storage, release or maintenance of the picture, image, tape or CD.    7.   I consent to the presence of a  or observers in the operating room as deemed necessary by my physician or their designees.     8.   I recognize that in the event my procedure results in extended X-Ray/fluoroscopy time, I may develop a skin reaction.    9. If I have a Do Not Attempt Resuscitation (DNAR) order in place, that status will be suspended while in the operating room, procedural suite, and during the recovery period unless otherwise explicitly stated by me (or a person authorized to consent on my behalf). The surgeon or my attending physician will determine when the applicable recovery period ends for purposes of reinstating the DNAR order.  10. Patients having a sterilization procedure: I understand that if the procedure is successful the results will be permanent and it will therefore be impossible for me to inseminate, conceive, or bear children.  I also understand that the procedure is intended to result in sterility, although the result has not been guaranteed.   11. I acknowledge that my physician has explained sedation/analgesia administration to me including the risk and benefits I consent to the administration of sedation/analgesia as may be necessary or desirable in the judgment of my physician.    I CERTIFY THAT I HAVE READ AND FULLY UNDERSTAND THE ABOVE CONSENT TO OPERATION and/or OTHER PROCEDURE.    _________________________________________  __________________________________  Signature of Patient     Signature of Responsible Person         ___________________________________         Printed Name of Responsible Person           _________________________________                 Relationship to Patient  _________________________________________  ______________________________  Signature of Witness          Date  Time      Patient Name: Moises Olsen     : 7/15/1960                 Printed: 2025     Medical Record #: [id number]                     Page 1 [address]  [id number]    Consent for Anesthesia    I, Moises Olsen agree to be  cared for by an anesthesiologist, who is specially trained to monitor me and give me medicine to put me to sleep or keep me comfortable during my procedure    I understand that my anesthesiologist is not an employee or agent of [facility] or [employer]. He or she works for [employer].    As the patient asking for anesthesia services, I agree to:  Allow the anesthesiologist (anesthesia doctor) to give me medicine and do additional procedures as necessary. Some examples are: Starting or using an “IV” to give me medicine, fluids or blood during my procedure, and having a breathing tube placed to help me breathe when I’m asleep (intubation). In the event that my heart stops working properly, I understand that my anesthesiologist will make every effort to sustain my life, unless otherwise directed by [facility] Do Not Resuscitate documents.  Tell my anesthesia doctor before my procedure:  If I am pregnant.  The last time that I ate or drank.  All of the medicines I take (including prescriptions, herbal supplements, and pills I can buy without a prescription (including street drugs/illegal medications). Failure to inform my anesthesiologist about these medicines may increase my risk of anesthetic complications.  If I am allergic to anything or have had a reaction to anesthesia before.  I understand how the anesthesia medicine will help me (benefits).  I understand that with any type of anesthesia medicine there are risks:  The most common risks are: nausea, vomiting, sore throat, muscle soreness, damage to my eyes, mouth, or teeth (from breathing tube placement).  Rare risks include: remembering what happened during my procedure, allergic reactions to medications, injury to my airway, heart, lungs, vision, nerves, or muscles and in extremely rare instances death.  My doctor has explained to me other choices available to me for my care (alternatives).  Pregnant Patients  (“epidural”):  I understand that the risks of having an epidural (medicine given into my back to help control pain during labor), include itching, low blood pressure, difficulty urinating, headache or slowing of the baby’s heart. Very rare risks include infection, bleeding, seizure, irregular heart rhythms and nerve injury.  Regional Anesthesia (“spinal”, “epidural”, & “nerve blocks”):  I understand that rare but potential complications include headache, bleeding, infection, seizure, irregular heart rhythms, and nerve injury.    I can change my mind about having anesthesia services at any time before I get the medicine.    _____________________________________________________________________________  Patient (or Representative) Signature/Relationship to Patient  Date   Time    _____________________________________________________________________________   Name (if used)    Language/Organization   Time    _____________________________________________________________________________  Anesthesiologist Signature     Date   Time  I have discussed the procedure and information above with the patient (or patient’s representative) and answered their questions. The patient or their representative has agreed to have anesthesia services.    _____________________________________________________________________________  Witness        Date   Time  I have verified that the signature is that of the patient or patient’s representative, and that it was signed before the procedure  Patient Name: Moises Olsen     : 7/15/1960                 Printed: 2025     Medical Record #: [id number]                     Page 2 of 2

## (undated) NOTE — Clinical Note
Hi Dr. Rader, pt feeling better since discharge.  Has scheduled visit with you.  Thank you, Vijaya

## (undated) NOTE — LETTER
[address]  [address]  Authorization for Surgical Operation and Procedure       Date:   1/22/25                                                                                                        Time:   0600  I hereby authorize Surgeon(s):  Geoffrey Milan MD, my physician and his/her assistants (if applicable), which may include medical students, residents, and/or fellows, to perform the following surgical operation/ procedure and administer such anesthesia as may be determined necessary by my physician:  Operation/Procedure name (s) colonoscopy with monitored anesthesia care and possible biopsy, polypectomy, control of bleeding  on Moises Olsen   2.   I recognize that during the surgical operation/procedure, unforeseen conditions may necessitate additional or different procedures than those listed above.  I, therefore, further authorize and request that the above-named surgeon, assistants, or designees perform such procedures as are, in their judgment, necessary and desirable.    3.   My surgeon/physician has discussed prior to my surgery the potential benefits, risks and side effects of this procedure; the likelihood of achieving goals; and potential problems that might occur during recuperation.  They also discussed reasonable alternatives to the procedure, including risks, benefits, and side effects related to the alternatives and risks related to not receiving this procedure.  I have had all my questions answered and I acknowledge that no guarantee has been made as to the result that may be obtained.    4.   Should the need arise during my operation/procedure, which includes change of level of care prior to discharge, I also consent to the administration of blood and/or blood products.  Further, I understand that despite careful testing and screening of blood or blood products by collecting agencies, I may still be subject to ill effects as a result of receiving a  blood transfusion and/or blood products.  The following are some, but not all, of the potential risks that can occur: fever and allergic reactions, hemolytic reactions, transmission of diseases such as Hepatitis, AIDS and Cytomegalovirus (CMV) and fluid overload.  In the event that I wish to have an autologous transfusion of my own blood, or a directed donor transfusion, I will discuss this with my physician.  Check only if Refusing Blood or Blood Products  I understand refusal of blood or blood products as deemed necessary by my physician may have serious consequences to my condition to include possible death. I hereby assume responsibility for my refusal and release the hospital, its personnel, and my physicians from any responsibility for the consequences of my refusal.          o  Refuse      5.   I authorize the use of any specimen, organs, tissues, body parts or foreign objects that may be removed from my body during the operation/procedure for diagnosis, research or teaching purposes and their subsequent disposal by hospital authorities.  I also authorize the release of specimen test results and/or written reports to my treating physician on the hospital medical staff or other referring or consulting physicians involved in my care, at the discretion of the Pathologist or my treating physician.    6.   I consent to the photographing or videotaping of the operations or procedures to be performed, including appropriate portions of my body for medical, scientific, or educational purposes, provided my identity is not revealed by the pictures or by descriptive texts accompanying them.  If the procedure has been photographed/videotaped, the surgeon will obtain the original picture, image, videotape or CD.  The hospital will not be responsible for storage, release or maintenance of the picture, image, tape or CD.    7.   I consent to the presence of a  or observers in the operating room as deemed  necessary by my physician or their designees.    8.   I recognize that in the event my procedure results in extended X-Ray/fluoroscopy time, I may develop a skin reaction.    9. If I have a Do Not Attempt Resuscitation (DNAR) order in place, that status will be suspended while in the operating room, procedural suite, and during the recovery period unless otherwise explicitly stated by me (or a person authorized to consent on my behalf). The surgeon or my attending physician will determine when the applicable recovery period ends for purposes of reinstating the DNAR order.  10. Patients having a sterilization procedure: I understand that if the procedure is successful the results will be permanent and it will therefore be impossible for me to inseminate, conceive, or bear children.  I also understand that the procedure is intended to result in sterility, although the result has not been guaranteed.   11. I acknowledge that my physician has explained sedation/analgesia administration to me including the risk and benefits I consent to the administration of sedation/analgesia as may be necessary or desirable in the judgment of my physician.    I CERTIFY THAT I HAVE READ AND FULLY UNDERSTAND THE ABOVE CONSENT TO OPERATION and/or OTHER PROCEDURE.    _________________________________________  __________________________________  Signature of Patient     Signature of Responsible Person         ___________________________________         Printed Name of Responsible Person           _________________________________                 Relationship to Patient  _________________________________________  ______________________________  Signature of Witness          Date  Time      Patient Name: Moises Olsen     : 7/15/1960                 Printed: 2025     Medical Record #: [id number]                     Page 1 of 2                                    [address]  [address]    Consent  for Anesthesia    I, Moises Oslen agree to be cared for by an anesthesiologist, who is specially trained to monitor me and give me medicine to put me to sleep or keep me comfortable during my procedure    I understand that my anesthesiologist is not an employee or agent of [facility] or [employer]. He or she works for [employer].    As the patient asking for anesthesia services, I agree to:  Allow the anesthesiologist (anesthesia doctor) to give me medicine and do additional procedures as necessary. Some examples are: Starting or using an “IV” to give me medicine, fluids or blood during my procedure, and having a breathing tube placed to help me breathe when I’m asleep (intubation). In the event that my heart stops working properly, I understand that my anesthesiologist will make every effort to sustain my life, unless otherwise directed by [facility] Do Not Resuscitate documents.  Tell my anesthesia doctor before my procedure:  If I am pregnant.  The last time that I ate or drank.  All of the medicines I take (including prescriptions, herbal supplements, and pills I can buy without a prescription (including street drugs/illegal medications). Failure to inform my anesthesiologist about these medicines may increase my risk of anesthetic complications.  If I am allergic to anything or have had a reaction to anesthesia before.  I understand how the anesthesia medicine will help me (benefits).  I understand that with any type of anesthesia medicine there are risks:  The most common risks are: nausea, vomiting, sore throat, muscle soreness, damage to my eyes, mouth, or teeth (from breathing tube placement).  Rare risks include: remembering what happened during my procedure, allergic reactions to medications, injury to my airway, heart, lungs, vision, nerves, or muscles and in extremely rare instances death.  My doctor has explained to me other choices available to me for my care  (alternatives).  Pregnant Patients (“epidural”):  I understand that the risks of having an epidural (medicine given into my back to help control pain during labor), include itching, low blood pressure, difficulty urinating, headache or slowing of the baby’s heart. Very rare risks include infection, bleeding, seizure, irregular heart rhythms and nerve injury.  Regional Anesthesia (“spinal”, “epidural”, & “nerve blocks”):  I understand that rare but potential complications include headache, bleeding, infection, seizure, irregular heart rhythms, and nerve injury.    I can change my mind about having anesthesia services at any time before I get the medicine.    _____________________________________________________________________________  Patient (or Representative) Signature/Relationship to Patient  Date   Time    _____________________________________________________________________________   Name (if used)    Language/Organization   Time    _____________________________________________________________________________  Anesthesiologist Signature     Date   Time  I have discussed the procedure and information above with the patient (or patient’s representative) and answered their questions. The patient or their representative has agreed to have anesthesia services.    _____________________________________________________________________________  Witness        Date   Time  I have verified that the signature is that of the patient or patient’s representative, and that it was signed before the procedure  Patient Name: Moises Olsen     : 7/15/1960                 Printed: 2025     Medical Record #: [id number]                     Page 2 of 2